# Patient Record
Sex: MALE | Race: WHITE | NOT HISPANIC OR LATINO | Employment: OTHER | URBAN - NONMETROPOLITAN AREA
[De-identification: names, ages, dates, MRNs, and addresses within clinical notes are randomized per-mention and may not be internally consistent; named-entity substitution may affect disease eponyms.]

---

## 2017-06-05 DIAGNOSIS — C18.4 MALIGNANT NEOPLASM OF TRANSVERSE COLON (HCC): Primary | ICD-10-CM

## 2017-06-07 ENCOUNTER — LAB (OUTPATIENT)
Dept: ONCOLOGY | Facility: HOSPITAL | Age: 77
End: 2017-06-07

## 2017-06-07 ENCOUNTER — APPOINTMENT (OUTPATIENT)
Dept: ONCOLOGY | Facility: HOSPITAL | Age: 77
End: 2017-06-07

## 2017-06-07 DIAGNOSIS — C18.4 MALIGNANT NEOPLASM OF TRANSVERSE COLON (HCC): ICD-10-CM

## 2017-06-07 LAB
ALBUMIN SERPL-MCNC: 4.2 G/DL (ref 3.4–4.8)
ALBUMIN/GLOB SERPL: 1.3 G/DL (ref 1.1–1.8)
ALP SERPL-CCNC: 54 U/L (ref 38–126)
ALT SERPL W P-5'-P-CCNC: 16 U/L (ref 21–72)
ANION GAP SERPL CALCULATED.3IONS-SCNC: 11 MMOL/L (ref 5–15)
AST SERPL-CCNC: 23 U/L (ref 17–59)
BASOPHILS # BLD AUTO: 0.01 10*3/MM3 (ref 0–0.2)
BASOPHILS NFR BLD AUTO: 0.2 % (ref 0–2)
BILIRUB SERPL-MCNC: 0.6 MG/DL (ref 0.2–1.3)
BUN BLD-MCNC: 20 MG/DL (ref 7–21)
BUN/CREAT SERPL: 19 (ref 7–25)
CALCIUM SPEC-SCNC: 8.7 MG/DL (ref 8.4–10.2)
CEA SERPL-MCNC: 1 NG/ML (ref 0–5)
CHLORIDE SERPL-SCNC: 104 MMOL/L (ref 95–110)
CO2 SERPL-SCNC: 24 MMOL/L (ref 22–31)
CREAT BLD-MCNC: 1.05 MG/DL (ref 0.7–1.3)
DEPRECATED RDW RBC AUTO: 41.3 FL (ref 35.1–43.9)
EOSINOPHIL # BLD AUTO: 0.15 10*3/MM3 (ref 0–0.7)
EOSINOPHIL NFR BLD AUTO: 3.3 % (ref 0–7)
ERYTHROCYTE [DISTWIDTH] IN BLOOD BY AUTOMATED COUNT: 13.1 % (ref 11.5–14.5)
GFR SERPL CREATININE-BSD FRML MDRD: 68 ML/MIN/1.73
GLOBULIN UR ELPH-MCNC: 3.2 GM/DL (ref 2.3–3.5)
GLUCOSE BLD-MCNC: 98 MG/DL (ref 60–100)
HCT VFR BLD AUTO: 39.3 % (ref 39–49)
HGB BLD-MCNC: 13.4 G/DL (ref 13.7–17.3)
IMM GRANULOCYTES # BLD: 0.01 10*3/MM3 (ref 0–0.02)
IMM GRANULOCYTES NFR BLD: 0.2 % (ref 0–0.5)
LYMPHOCYTES # BLD AUTO: 1.33 10*3/MM3 (ref 0.6–4.2)
LYMPHOCYTES NFR BLD AUTO: 29.4 % (ref 10–50)
MCH RBC QN AUTO: 29.4 PG (ref 26.5–34)
MCHC RBC AUTO-ENTMCNC: 34.1 G/DL (ref 31.5–36.3)
MCV RBC AUTO: 86.2 FL (ref 80–98)
MONOCYTES # BLD AUTO: 0.48 10*3/MM3 (ref 0–0.9)
MONOCYTES NFR BLD AUTO: 10.6 % (ref 0–12)
NEUTROPHILS # BLD AUTO: 2.54 10*3/MM3 (ref 2–8.6)
NEUTROPHILS NFR BLD AUTO: 56.3 % (ref 37–80)
PLATELET # BLD AUTO: 148 10*3/MM3 (ref 150–450)
PMV BLD AUTO: 12.8 FL (ref 8–12)
POTASSIUM BLD-SCNC: 4.5 MMOL/L (ref 3.5–5.1)
PROT SERPL-MCNC: 7.4 G/DL (ref 6.3–8.6)
RBC # BLD AUTO: 4.56 10*6/MM3 (ref 4.37–5.74)
SODIUM BLD-SCNC: 139 MMOL/L (ref 137–145)
WBC NRBC COR # BLD: 4.52 10*3/MM3 (ref 3.2–9.8)

## 2017-06-07 PROCEDURE — 85025 COMPLETE CBC W/AUTO DIFF WBC: CPT

## 2017-06-07 PROCEDURE — 80053 COMPREHEN METABOLIC PANEL: CPT

## 2017-06-07 PROCEDURE — 82378 CARCINOEMBRYONIC ANTIGEN: CPT

## 2017-06-07 PROCEDURE — 36415 COLL VENOUS BLD VENIPUNCTURE: CPT

## 2017-06-16 ENCOUNTER — OFFICE VISIT (OUTPATIENT)
Dept: ONCOLOGY | Facility: CLINIC | Age: 77
End: 2017-06-16

## 2017-06-16 ENCOUNTER — APPOINTMENT (OUTPATIENT)
Dept: ONCOLOGY | Facility: HOSPITAL | Age: 77
End: 2017-06-16

## 2017-06-16 VITALS
WEIGHT: 246.5 LBS | SYSTOLIC BLOOD PRESSURE: 148 MMHG | HEART RATE: 57 BPM | RESPIRATION RATE: 22 BRPM | DIASTOLIC BLOOD PRESSURE: 91 MMHG | TEMPERATURE: 97.6 F

## 2017-06-16 DIAGNOSIS — C18.4 MALIGNANT NEOPLASM OF TRANSVERSE COLON (HCC): Primary | ICD-10-CM

## 2017-06-16 PROCEDURE — 99213 OFFICE O/P EST LOW 20 MIN: CPT | Performed by: INTERNAL MEDICINE

## 2017-06-16 PROCEDURE — G0463 HOSPITAL OUTPT CLINIC VISIT: HCPCS | Performed by: INTERNAL MEDICINE

## 2017-06-16 RX ORDER — LANOLIN ALCOHOL/MO/W.PET/CERES
1000 CREAM (GRAM) TOPICAL DAILY PRN
COMMUNITY
End: 2018-03-28

## 2017-06-16 RX ORDER — ASPIRIN 81 MG/1
81 TABLET ORAL NIGHTLY
COMMUNITY

## 2017-06-16 NOTE — PROGRESS NOTES
Subjective Mr. Hwang is here for follow-up of the colon cancer.    HISTORY OF PRESENT ILLNESS:     History of Present Illness  this is a 77-year-old gentleman with a diagnosis of colon cancer in July 2012.  Patient had left hemicolectomy.  Stage III T3 N1 M0.  Patient received 12 cycles of modified FOLFOX 6 in March 2013.  He has no new complaints.  His CEA is at 1.  He denies any chest pain, shortness of breath abdominal pain nausea vomiting weight loss or melena.  Past Medical History, Past Surgical History, Social History, Family History have been reviewed and are without significant changes except as mentioned.    Review of Systems   Constitutional: Negative.    HENT: Negative.    Eyes: Negative.    Respiratory: Negative.    Cardiovascular: Negative.    Gastrointestinal: Negative.    Endocrine: Negative.    Genitourinary: Negative.    Musculoskeletal: Negative.    Skin: Negative.    Allergic/Immunologic: Negative.    Neurological: Negative.    Hematological: Negative.    Psychiatric/Behavioral: Negative.       A comprehensive 14 point review of systems was performed and was negative except as mentioned.    Medications:  The current medication list was reviewed in the EMR    ALLERGIES:  No Known Allergies    Objective      Vitals:    06/16/17 1036   BP: 148/91   Pulse: 57   Resp: 22   Temp: 97.6 °F (36.4 °C)   Weight: 246 lb 8 oz (112 kg)   PainSc: 0-No pain     Current Status 6/16/2017   ECOG score 1       Physical Exam   Constitutional: He is oriented to person, place, and time. He appears well-developed and well-nourished.   HENT:   Head: Normocephalic and atraumatic.   Nose: Nose normal.   Mouth/Throat: Oropharynx is clear and moist.   Eyes: Conjunctivae and EOM are normal. Pupils are equal, round, and reactive to light.   Neck: Normal range of motion. Neck supple.   Cardiovascular: Normal rate, regular rhythm, normal heart sounds and intact distal pulses.    Pulmonary/Chest: Effort normal and breath  sounds normal.   Abdominal: Soft. Bowel sounds are normal.   Musculoskeletal: Normal range of motion.   Neurological: He is alert and oriented to person, place, and time.   Skin: Skin is warm.   Psychiatric: He has a normal mood and affect. His behavior is normal.   Nursing note and vitals reviewed.        RECENT LABS:  Hematology WBC   Date Value Ref Range Status   06/07/2017 4.52 3.20 - 9.80 10*3/mm3 Final     RBC   Date Value Ref Range Status   06/07/2017 4.56 4.37 - 5.74 10*6/mm3 Final     Hemoglobin   Date Value Ref Range Status   06/07/2017 13.4 (L) 13.7 - 17.3 g/dL Final     Hematocrit   Date Value Ref Range Status   06/07/2017 39.3 39.0 - 49.0 % Final     Platelets   Date Value Ref Range Status   06/07/2017 148 (L) 150 - 450 10*3/mm3 Final          CEA 1.0  Colonoscopy May 2016 WNL.    Assessment/Plan     1.  Stage III colon cancer 2012.  Currently patient has no evidence of disease.  Observation is indicated.  His hemoglobin is slightly low at 13.4.  We will keep close follow up on that.  In case he has any new symptoms patient is asked to come in earlier.  Otherwise plan will be to see him in 6 months with CBC CMP and a CEA.  He will not be due for colonoscopy until 2019.  2.  Thrombocytopenia resolved  3.  Abdominal wall hernia: For now observation.  Patient remains full code.    Sd Robledo M.D.                  6/16/2017      CC:

## 2017-12-08 ENCOUNTER — LAB (OUTPATIENT)
Dept: ONCOLOGY | Facility: HOSPITAL | Age: 77
End: 2017-12-08

## 2017-12-08 DIAGNOSIS — C18.4 MALIGNANT NEOPLASM OF TRANSVERSE COLON (HCC): ICD-10-CM

## 2017-12-08 LAB
ALBUMIN SERPL-MCNC: 4.2 G/DL (ref 3.4–4.8)
ALBUMIN/GLOB SERPL: 1.4 G/DL (ref 1.1–1.8)
ALP SERPL-CCNC: 49 U/L (ref 38–126)
ALT SERPL W P-5'-P-CCNC: 21 U/L (ref 21–72)
ANION GAP SERPL CALCULATED.3IONS-SCNC: 8 MMOL/L (ref 5–15)
AST SERPL-CCNC: 23 U/L (ref 17–59)
BASOPHILS # BLD AUTO: 0.04 10*3/MM3 (ref 0–0.2)
BASOPHILS NFR BLD AUTO: 0.6 % (ref 0–2)
BILIRUB SERPL-MCNC: 0.6 MG/DL (ref 0.2–1.3)
BUN BLD-MCNC: 12 MG/DL (ref 7–21)
BUN/CREAT SERPL: 10.5 (ref 7–25)
CALCIUM SPEC-SCNC: 8.8 MG/DL (ref 8.4–10.2)
CEA SERPL-MCNC: 0.6 NG/ML (ref 0–5)
CHLORIDE SERPL-SCNC: 101 MMOL/L (ref 95–110)
CO2 SERPL-SCNC: 27 MMOL/L (ref 22–31)
CREAT BLD-MCNC: 1.14 MG/DL (ref 0.7–1.3)
DEPRECATED RDW RBC AUTO: 40 FL (ref 35.1–43.9)
EOSINOPHIL # BLD AUTO: 0.32 10*3/MM3 (ref 0–0.7)
EOSINOPHIL NFR BLD AUTO: 5.2 % (ref 0–7)
ERYTHROCYTE [DISTWIDTH] IN BLOOD BY AUTOMATED COUNT: 13.2 % (ref 11.5–14.5)
GFR SERPL CREATININE-BSD FRML MDRD: 62 ML/MIN/1.73 (ref 60–98)
GLOBULIN UR ELPH-MCNC: 3.1 GM/DL (ref 2.3–3.5)
GLUCOSE BLD-MCNC: 92 MG/DL (ref 60–100)
HCT VFR BLD AUTO: 39.5 % (ref 39–49)
HGB BLD-MCNC: 13.3 G/DL (ref 13.7–17.3)
IMM GRANULOCYTES # BLD: 0.02 10*3/MM3 (ref 0–0.02)
IMM GRANULOCYTES NFR BLD: 0.3 % (ref 0–0.5)
LYMPHOCYTES # BLD AUTO: 1.75 10*3/MM3 (ref 0.6–4.2)
LYMPHOCYTES NFR BLD AUTO: 28.3 % (ref 10–50)
MCH RBC QN AUTO: 28.2 PG (ref 26.5–34)
MCHC RBC AUTO-ENTMCNC: 33.7 G/DL (ref 31.5–36.3)
MCV RBC AUTO: 83.9 FL (ref 80–98)
MONOCYTES # BLD AUTO: 0.63 10*3/MM3 (ref 0–0.9)
MONOCYTES NFR BLD AUTO: 10.2 % (ref 0–12)
NEUTROPHILS # BLD AUTO: 3.42 10*3/MM3 (ref 2–8.6)
NEUTROPHILS NFR BLD AUTO: 55.4 % (ref 37–80)
PLATELET # BLD AUTO: 204 10*3/MM3 (ref 150–450)
PMV BLD AUTO: 11.9 FL (ref 8–12)
POTASSIUM BLD-SCNC: 4.3 MMOL/L (ref 3.5–5.1)
PROT SERPL-MCNC: 7.3 G/DL (ref 6.3–8.6)
RBC # BLD AUTO: 4.71 10*6/MM3 (ref 4.37–5.74)
SODIUM BLD-SCNC: 136 MMOL/L (ref 137–145)
WBC NRBC COR # BLD: 6.18 10*3/MM3 (ref 3.2–9.8)

## 2017-12-08 PROCEDURE — 36415 COLL VENOUS BLD VENIPUNCTURE: CPT

## 2017-12-08 PROCEDURE — 85025 COMPLETE CBC W/AUTO DIFF WBC: CPT

## 2017-12-08 PROCEDURE — 80053 COMPREHEN METABOLIC PANEL: CPT

## 2017-12-08 PROCEDURE — 82378 CARCINOEMBRYONIC ANTIGEN: CPT

## 2017-12-15 ENCOUNTER — OFFICE VISIT (OUTPATIENT)
Dept: ONCOLOGY | Facility: CLINIC | Age: 77
End: 2017-12-15

## 2017-12-15 VITALS
DIASTOLIC BLOOD PRESSURE: 83 MMHG | RESPIRATION RATE: 16 BRPM | HEIGHT: 72 IN | TEMPERATURE: 98.2 F | WEIGHT: 248.9 LBS | SYSTOLIC BLOOD PRESSURE: 122 MMHG | HEART RATE: 65 BPM | BODY MASS INDEX: 33.71 KG/M2

## 2017-12-15 DIAGNOSIS — Z85.038 HISTORY OF COLON CANCER: Primary | ICD-10-CM

## 2017-12-15 PROCEDURE — 99214 OFFICE O/P EST MOD 30 MIN: CPT | Performed by: NURSE PRACTITIONER

## 2017-12-15 PROCEDURE — G0463 HOSPITAL OUTPT CLINIC VISIT: HCPCS | Performed by: NURSE PRACTITIONER

## 2017-12-15 NOTE — PROGRESS NOTES
"DATE OF VISIT: 12/15/2017    REASON FOR VISIT:  6 month follow-up for history of colon cancer, T3N1M0        HISTORY OF PRESENT ILLNESS:      77 yr old male with diagnosis of colon cancer, stage III; he was diagnosed in 2012 and completed adjuvant chemotherapy, 12 cycles of FOLFOX in [place] 2013. He has been on clinical surveillance. He was told on his last visit that he is more than 5 yrs out and no additional CT imaging is needed unless he is having any symptoms. He denies any bowel problems, no blood in his stool, appetite is good.          PAST MEDICAL HISTORY:    Past Medical History:   Diagnosis Date   • Colon cancer    • Hernia    • High cholesterol        SOCIAL HISTORY:    Social History   Substance Use Topics   • Smoking status: Former Smoker     Packs/day: 1.00     Years: 30.00     Types: Cigarettes     Quit date: 1997   • Smokeless tobacco: None   • Alcohol use None       Surgical History :  Past Surgical History:   Procedure Laterality Date   • COLON SURGERY     • COLONOSCOPY     • FINGER SURGERY         ALLERGIES:    No Known Allergies    REVIEW OF SYSTEMS:      CONSTITUTIONAL:  No fever, chills, or night sweats.     HEENT:  No epistaxis, mouth sores, or difficulty swallowing.    RESPIRATORY:  No new shortness of breath or cough at present.    CARDIOVASCULAR:  No chest pain or palpitations.    GASTROINTESTINAL:  No abdominal pain, nausea, vomiting, or blood in the stool.    GENITOURINARY:  No dysuria or hematuria.    MUSCULOSKELETAL:  No any new back pain or arthralgias.     NEUROLOGICAL:  No tingling or numbness. No new headache or dizziness.     LYMPHATICS:  Denies any abnormal swollen and anywhere in the body.    SKIN:  Denies any new skin rash.    PHYSICAL EXAMINATION:      VITAL SIGNS:  /83  Pulse 65  Temp 98.2 °F (36.8 °C) (Temporal Artery )   Resp 16  Ht 181.6 cm (71.5\")  Wt 113 kg (248 lb 14.4 oz)  BMI 34.23 kg/m2    GENERAL:  Not in any distress.    HEENT:  Normocephalic, " Atraumatic.Mild Conjunctival pallor. No icterus. No Facial Asymmetry noted.    NECK:  No adenopathy. No JVD.    RESPIRATORY:  Fair air entry bilateral. No rhonchi or wheezing.    CARDIOVASCULAR:  S1, S2. Regular rate and rhythm. No murmur or gallop appreciated.    ABDOMEN:  Soft, obese, nontender. Bowel sounds present in all four quadrants.  No organomegaly appreciated.    EXTREMITIES:  No edema.No Calf Tenderness.    NEUROLOGIC:  Alert, awake and oriented ×3.    SKIN : No new skin lesion identified  DIAGNOSTIC DATA:    Glucose   Date Value Ref Range Status   12/08/2017 92 60 - 100 mg/dL Final     Sodium   Date Value Ref Range Status   12/08/2017 136 (L) 137 - 145 mmol/L Final     Potassium   Date Value Ref Range Status   12/08/2017 4.3 3.5 - 5.1 mmol/L Final     CO2   Date Value Ref Range Status   12/08/2017 27.0 22.0 - 31.0 mmol/L Final     Chloride   Date Value Ref Range Status   12/08/2017 101 95 - 110 mmol/L Final     Anion Gap   Date Value Ref Range Status   12/08/2017 8.0 5.0 - 15.0 mmol/L Final     Creatinine   Date Value Ref Range Status   12/08/2017 1.14 0.70 - 1.30 mg/dL Final     BUN   Date Value Ref Range Status   12/08/2017 12 7 - 21 mg/dL Final     BUN/Creatinine Ratio   Date Value Ref Range Status   12/08/2017 10.5 7.0 - 25.0 Final     Calcium   Date Value Ref Range Status   12/08/2017 8.8 8.4 - 10.2 mg/dL Final     eGFR Non  Amer   Date Value Ref Range Status   12/08/2017 62 >60 mL/min/1.73 Final     Alkaline Phosphatase   Date Value Ref Range Status   12/08/2017 49 38 - 126 U/L Final     Total Protein   Date Value Ref Range Status   12/08/2017 7.3 6.3 - 8.6 g/dL Final     ALT (SGPT)   Date Value Ref Range Status   12/08/2017 21 21 - 72 U/L Final     AST (SGOT)   Date Value Ref Range Status   12/08/2017 23 17 - 59 U/L Final     Total Bilirubin   Date Value Ref Range Status   12/08/2017 0.6 0.2 - 1.3 mg/dL Final     Albumin   Date Value Ref Range Status   12/08/2017 4.20 3.40 - 4.80 g/dL Final      Globulin   Date Value Ref Range Status   12/08/2017 3.1 2.3 - 3.5 gm/dL Final     A/G Ratio   Date Value Ref Range Status   12/08/2017 1.4 1.1 - 1.8 g/dL Final     Lab Results   Component Value Date    WBC 6.18 12/08/2017    HGB 13.3 (L) 12/08/2017    HCT 39.5 12/08/2017    MCV 83.9 12/08/2017     12/08/2017     Lab Results   Component Value Date    NEUTROABS 3.42 12/08/2017    ZDAECJCH73 682 02/11/2014    FOLATE 11.20 02/11/2014     Lab Results   Component Value Date    CEA 0.60 12/08/2017   ]        ASSESSMENT AND PLAN: .      1. Stage III colon cancer 2012.  He completed 12 cycles of adjuvant FOLFOX chemotherapy in March 2013; last CT scan in 2016 showed FADI; he had a colonoscopy in 2016 and will be due again in 2019. He is feeling well, will plan to continue on clinical surveillance. Return to clinic in 6 mos with repeat labs.    2. Health maintenance, he does not smoke.          This document has been signed by MARIA TERESA Nunn on December 15, 2017 1:24 PM

## 2018-04-04 ENCOUNTER — HOSPITAL ENCOUNTER (OUTPATIENT)
Facility: HOSPITAL | Age: 78
Setting detail: HOSPITAL OUTPATIENT SURGERY
Discharge: HOME | End: 2018-04-04
Attending: INTERNAL MEDICINE | Admitting: INTERNAL MEDICINE

## 2018-04-04 ENCOUNTER — ANESTHESIA (OUTPATIENT)
Dept: GASTROENTEROLOGY | Facility: HOSPITAL | Age: 78
End: 2018-04-04

## 2018-04-04 ENCOUNTER — ANESTHESIA EVENT (OUTPATIENT)
Dept: GASTROENTEROLOGY | Facility: HOSPITAL | Age: 78
End: 2018-04-04

## 2018-04-04 VITALS
HEIGHT: 71 IN | SYSTOLIC BLOOD PRESSURE: 116 MMHG | RESPIRATION RATE: 18 BRPM | DIASTOLIC BLOOD PRESSURE: 55 MMHG | BODY MASS INDEX: 35.07 KG/M2 | WEIGHT: 250.5 LBS | OXYGEN SATURATION: 93 % | HEART RATE: 63 BPM | TEMPERATURE: 97.8 F

## 2018-04-04 DIAGNOSIS — K92.2 GASTROINTESTINAL BLEEDING: ICD-10-CM

## 2018-04-04 PROCEDURE — 88305 TISSUE EXAM BY PATHOLOGIST: CPT | Performed by: PATHOLOGY

## 2018-04-04 PROCEDURE — 88305 TISSUE EXAM BY PATHOLOGIST: CPT | Performed by: INTERNAL MEDICINE

## 2018-04-04 PROCEDURE — 25010000002 PROPOFOL 10 MG/ML EMULSION: Performed by: NURSE ANESTHETIST, CERTIFIED REGISTERED

## 2018-04-04 PROCEDURE — 88342 IMHCHEM/IMCYTCHM 1ST ANTB: CPT | Performed by: INTERNAL MEDICINE

## 2018-04-04 PROCEDURE — 88342 IMHCHEM/IMCYTCHM 1ST ANTB: CPT | Performed by: PATHOLOGY

## 2018-04-04 RX ORDER — LIDOCAINE HYDROCHLORIDE 10 MG/ML
INJECTION, SOLUTION INFILTRATION; PERINEURAL AS NEEDED
Status: DISCONTINUED | OUTPATIENT
Start: 2018-04-04 | End: 2018-04-04 | Stop reason: SURG

## 2018-04-04 RX ORDER — PROMETHAZINE HYDROCHLORIDE 25 MG/ML
12.5 INJECTION, SOLUTION INTRAMUSCULAR; INTRAVENOUS ONCE AS NEEDED
Status: DISCONTINUED | OUTPATIENT
Start: 2018-04-04 | End: 2018-04-04 | Stop reason: HOSPADM

## 2018-04-04 RX ORDER — ONDANSETRON 2 MG/ML
4 INJECTION INTRAMUSCULAR; INTRAVENOUS ONCE AS NEEDED
Status: DISCONTINUED | OUTPATIENT
Start: 2018-04-04 | End: 2018-04-04 | Stop reason: HOSPADM

## 2018-04-04 RX ORDER — MEPERIDINE HYDROCHLORIDE 50 MG/ML
12.5 INJECTION INTRAMUSCULAR; INTRAVENOUS; SUBCUTANEOUS
Status: DISCONTINUED | OUTPATIENT
Start: 2018-04-04 | End: 2018-04-04 | Stop reason: HOSPADM

## 2018-04-04 RX ORDER — DEXTROSE AND SODIUM CHLORIDE 5; .45 G/100ML; G/100ML
20 INJECTION, SOLUTION INTRAVENOUS CONTINUOUS
Status: DISCONTINUED | OUTPATIENT
Start: 2018-04-04 | End: 2018-04-04 | Stop reason: HOSPADM

## 2018-04-04 RX ORDER — DEXAMETHASONE SODIUM PHOSPHATE 4 MG/ML
8 INJECTION, SOLUTION INTRA-ARTICULAR; INTRALESIONAL; INTRAMUSCULAR; INTRAVENOUS; SOFT TISSUE ONCE AS NEEDED
Status: DISCONTINUED | OUTPATIENT
Start: 2018-04-04 | End: 2018-04-04 | Stop reason: HOSPADM

## 2018-04-04 RX ORDER — PROPOFOL 10 MG/ML
VIAL (ML) INTRAVENOUS AS NEEDED
Status: DISCONTINUED | OUTPATIENT
Start: 2018-04-04 | End: 2018-04-04 | Stop reason: SURG

## 2018-04-04 RX ORDER — PROMETHAZINE HYDROCHLORIDE 25 MG/1
25 TABLET ORAL ONCE AS NEEDED
Status: DISCONTINUED | OUTPATIENT
Start: 2018-04-04 | End: 2018-04-04 | Stop reason: HOSPADM

## 2018-04-04 RX ORDER — PROMETHAZINE HYDROCHLORIDE 25 MG/1
25 SUPPOSITORY RECTAL ONCE AS NEEDED
Status: DISCONTINUED | OUTPATIENT
Start: 2018-04-04 | End: 2018-04-04 | Stop reason: HOSPADM

## 2018-04-04 RX ADMIN — PROPOFOL 50 MG: 10 INJECTION, EMULSION INTRAVENOUS at 14:23

## 2018-04-04 RX ADMIN — DEXTROSE AND SODIUM CHLORIDE 20 ML/HR: 5; 450 INJECTION, SOLUTION INTRAVENOUS at 13:36

## 2018-04-04 RX ADMIN — PROPOFOL 50 MG: 10 INJECTION, EMULSION INTRAVENOUS at 14:31

## 2018-04-04 RX ADMIN — PROPOFOL 50 MG: 10 INJECTION, EMULSION INTRAVENOUS at 14:19

## 2018-04-04 RX ADMIN — LIDOCAINE HYDROCHLORIDE 50 MG: 10 INJECTION, SOLUTION INFILTRATION; PERINEURAL at 14:19

## 2018-04-04 RX ADMIN — PROPOFOL 50 MG: 10 INJECTION, EMULSION INTRAVENOUS at 14:28

## 2018-04-04 RX ADMIN — PROPOFOL 50 MG: 10 INJECTION, EMULSION INTRAVENOUS at 14:36

## 2018-04-04 NOTE — ANESTHESIA POSTPROCEDURE EVALUATION
Patient: Sunny Ernandez    Procedure Summary     Date:  04/04/18 Room / Location:  [facility] ENDOSCOPY 2 / [facility] ENDOSCOPY    Anesthesia Start:  1419 Anesthesia Stop:  1444    Procedures:       ESOPHAGOGASTRODUODENOSCOPY (N/A Esophagus)      COLONOSCOPY (N/A ) Diagnosis:       Gastrointestinal bleeding      (Gastrointestinal bleeding [K92.2])    Surgeon:  Austin Goins DO Provider:  Luke Hunt CRNA    Anesthesia Type:  MAC ASA Status:  3          Anesthesia Type: MAC  Last vitals  BP   146/85 (04/04/18 1319)   Temp   96.8 °F (36 °C) (04/04/18 1319)   Pulse   77 (04/04/18 1319)   Resp   18 (04/04/18 1319)     SpO2   96 % (04/04/18 1319)     Post Anesthesia Care and Evaluation    Patient location during evaluation: bedside  Patient participation: complete - patient cannot participate  Level of consciousness: awake  Pain score: 0  Pain management: adequate  Airway patency: patent  Anesthetic complications: No anesthetic complications  PONV Status: none  Cardiovascular status: acceptable  Respiratory status: acceptable  Hydration status: acceptable

## 2018-04-04 NOTE — H&P
Agusto Pal DO,[facility]  Gastroenterology  Hepatology  Endoscopy  Board Certified in Internal Medicine and gastroenterology  [address], [address]  [place]. [address]  - [phone]   F - [phone]     GASTROENTEROLOGY HISTORY AND PHYSICAL  NOTE   AGUSTO PAL DO.         SUBJECTIVE:   4/4/2018    Name: Sunny Ernandez  DOD: 1940        Chief Complaint:       Subjective : Gastrointestinal bleeding with occult blood within the stools with anemia with a personal history of colon cancer    Patient is 78 y.o. male presents with desire for elective EGD and colonoscopy.      ROS/HISTORY/ CURRENT MEDICATIONS/OBJECTIVE/VS/PE:   Review of Systems:   Review of Systems    History:     Past Medical History:   Diagnosis Date   • Colon cancer    • Hernia    • High cholesterol      Past Surgical History:   Procedure Laterality Date   • COLON RESECTION  2012   • COLON SURGERY     • COLONOSCOPY     • FINGER SURGERY       Family History   Problem Relation Age of Onset   • Lung cancer Mother      Social History   Substance Use Topics   • Smoking status: Former Smoker     Packs/day: 1.00     Years: 30.00     Types: Cigarettes     Quit date: 1997   • Smokeless tobacco: Current User     Types: Chew   • Alcohol use No     Prescriptions Prior to Admission   Medication Sig Dispense Refill Last Dose   • Cyanocobalamin (B-12 COMPLIANCE INJECTION) 1000 MCG/ML kit Inject 1,000 mcg as directed Every 30 (Thirty) Days.   4/1/2018   • aspirin 81 MG EC tablet Take 81 mg by mouth Daily.   4/1/2018   • Simethicone (GAS-X PO) Take  by mouth As Needed.   3/20/2018   • SIMVASTATIN PO Take 40 mg by mouth Every Night.   Taking     Allergies:  Review of patient's allergies indicates no known allergies.    I have reviewed the patients medical history, surgical history and family history in the available medical record system.     Current Medications:     Current Facility-Administered Medications   Medication Dose  Route Frequency Provider Last Rate Last Dose   • dextrose 5 % and sodium chloride 0.45 % infusion  20 mL/hr Intravenous Continuous Austin BEASLEY Buster, DO 20 mL/hr at 04/04/18 1336 20 mL/hr at 04/04/18 1336       Objective     Physical Exam:   Temp:  [96.8 °F (36 °C)] 96.8 °F (36 °C)  Heart Rate:  [77] 77  Resp:  [18] 18  BP: (146)/(85) 146/85    Physical Exam:  General Appearance:    Alert, cooperative, in no acute distress   Head:    Normocephalic, without obvious abnormality, atraumatic   Eyes:            Lids and lashes normal, conjunctivae and sclerae normal, no   icterus, no pallor, corneas clear, PERRLA   Ears:    Ears appear intact with no abnormalities noted   Throat:   No oral lesions, no thrush, oral mucosa moist   Neck:   No adenopathy, supple, trachea midline, no thyromegaly, no     carotid bruit, no JVD   Back:     No kyphosis present, no scoliosis present, no skin lesions,       erythema or scars, no tenderness to percussion or                   palpation,   range of motion normal   Lungs:     Clear to auscultation,respirations regular, even and                   unlabored    Heart:    Regular rhythm and normal rate, normal S1 and S2, no            murmur, no gallop, no rub, no click   Breast Exam:    Deferred   Abdomen:     Normal bowel sounds, no masses, no organomegaly, soft        non-tender, non-distended, no guarding, no rebound                 tenderness   Genitalia:    Deferred   Extremities:   Moves all extremities well, no edema, no cyanosis, no              redness   Pulses:   Pulses palpable and equal bilaterally   Skin:   No bleeding, bruising or rash   Lymph nodes:   No palpable adenopathy   Neurologic:   Cranial nerves 2 - 12 grossly intact, sensation intact, DTR        present and equal bilaterally      Results Review:     Lab Results   Component Value Date    WBC 6.18 12/08/2017    WBC 4.52 06/07/2017    WBC 5.4 12/02/2016    HGB 13.3 (L) 12/08/2017    HGB 13.4 (L) 06/07/2017    HGB 14.9  12/02/2016    HCT 39.5 12/08/2017    HCT 39.3 06/07/2017    HCT 41.7 12/02/2016     12/08/2017     (L) 06/07/2017     12/02/2016             No results found for: LIPASE  No results found for: INR       Radiology Review:  Imaging Results (last 72 hours)     ** No results found for the last 72 hours. **           I reviewed the patient's new clinical results.  I reviewed the patient's new imaging results and agree with the interpretation.     ASSESSMENT/PLAN:   ASSESSMENT:   1.  Gastrointestinal bleeding  2.  Occult blood within the stools  3.  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  Personal history of colon cancer  5.  Personal history of colon polyps    PLAN:   1.  Esophagogastroduodenoscopy with biopsy  2.  Colonoscopy    Risk and benefits associated with procedure are reviewed with the patient.  The patient wishes to proceed      Austin Goins DO  04/04/18  2:13 PM

## 2018-04-05 LAB
LAB AP CASE REPORT: NORMAL
LAB AP DIAGNOSIS COMMENT: NORMAL
Lab: NORMAL
PATH REPORT.FINAL DX SPEC: NORMAL
PATH REPORT.GROSS SPEC: NORMAL

## 2018-04-10 ENCOUNTER — OFFICE VISIT (OUTPATIENT)
Dept: SURGERY | Facility: CLINIC | Age: 78
End: 2018-04-10

## 2018-04-10 VITALS
WEIGHT: 252 LBS | SYSTOLIC BLOOD PRESSURE: 130 MMHG | DIASTOLIC BLOOD PRESSURE: 80 MMHG | BODY MASS INDEX: 34.13 KG/M2 | HEIGHT: 72 IN

## 2018-04-10 DIAGNOSIS — C18.2 MALIGNANT NEOPLASM OF ASCENDING COLON (HCC): Primary | ICD-10-CM

## 2018-04-10 DIAGNOSIS — K43.2 INCISIONAL HERNIA, WITHOUT OBSTRUCTION OR GANGRENE: ICD-10-CM

## 2018-04-10 PROCEDURE — 99214 OFFICE O/P EST MOD 30 MIN: CPT | Performed by: SURGERY

## 2018-04-10 RX ORDER — SODIUM CHLORIDE 9 MG/ML
100 INJECTION, SOLUTION INTRAVENOUS CONTINUOUS
Status: CANCELLED | OUTPATIENT
Start: 2018-04-18

## 2018-04-13 ENCOUNTER — APPOINTMENT (OUTPATIENT)
Dept: PREADMISSION TESTING | Facility: HOSPITAL | Age: 78
End: 2018-04-13

## 2018-04-13 VITALS
HEIGHT: 71 IN | SYSTOLIC BLOOD PRESSURE: 120 MMHG | HEART RATE: 78 BPM | WEIGHT: 251 LBS | RESPIRATION RATE: 18 BRPM | BODY MASS INDEX: 35.14 KG/M2 | DIASTOLIC BLOOD PRESSURE: 80 MMHG | OXYGEN SATURATION: 98 %

## 2018-04-13 DIAGNOSIS — C18.2 MALIGNANT NEOPLASM OF ASCENDING COLON (HCC): ICD-10-CM

## 2018-04-13 LAB
ABO GROUP BLD: NORMAL
ANION GAP SERPL CALCULATED.3IONS-SCNC: 10 MMOL/L (ref 5–15)
BLD GP AB SCN SERPL QL: NEGATIVE
BUN BLD-MCNC: 15 MG/DL (ref 7–21)
BUN/CREAT SERPL: 13.2 (ref 7–25)
CALCIUM SPEC-SCNC: 9 MG/DL (ref 8.4–10.2)
CHLORIDE SERPL-SCNC: 104 MMOL/L (ref 95–110)
CO2 SERPL-SCNC: 25 MMOL/L (ref 22–31)
CREAT BLD-MCNC: 1.14 MG/DL (ref 0.7–1.3)
DEPRECATED RDW RBC AUTO: 37.7 FL (ref 35.1–43.9)
ERYTHROCYTE [DISTWIDTH] IN BLOOD BY AUTOMATED COUNT: 13.1 % (ref 11.5–14.5)
GFR SERPL CREATININE-BSD FRML MDRD: 62 ML/MIN/1.73 (ref 60–98)
GLUCOSE BLD-MCNC: 113 MG/DL (ref 60–100)
HCT VFR BLD AUTO: 32.6 % (ref 39–49)
HGB BLD-MCNC: 11 G/DL (ref 13.7–17.3)
Lab: NORMAL
MCH RBC QN AUTO: 26.6 PG (ref 26.5–34)
MCHC RBC AUTO-ENTMCNC: 33.7 G/DL (ref 31.5–36.3)
MCV RBC AUTO: 78.7 FL (ref 80–98)
MRSA DNA SPEC QL NAA+PROBE: NEGATIVE
PLATELET # BLD AUTO: 223 10*3/MM3 (ref 150–450)
PMV BLD AUTO: 11.7 FL (ref 8–12)
POTASSIUM BLD-SCNC: 4.3 MMOL/L (ref 3.5–5.1)
RBC # BLD AUTO: 4.14 10*6/MM3 (ref 4.37–5.74)
RH BLD: POSITIVE
SODIUM BLD-SCNC: 139 MMOL/L (ref 137–145)
T&S EXPIRATION DATE: NORMAL
WBC NRBC COR # BLD: 5 10*3/MM3 (ref 3.2–9.8)

## 2018-04-13 PROCEDURE — 86901 BLOOD TYPING SEROLOGIC RH(D): CPT | Performed by: ANESTHESIOLOGY

## 2018-04-13 PROCEDURE — 86900 BLOOD TYPING SEROLOGIC ABO: CPT | Performed by: ANESTHESIOLOGY

## 2018-04-13 PROCEDURE — 86850 RBC ANTIBODY SCREEN: CPT | Performed by: ANESTHESIOLOGY

## 2018-04-13 PROCEDURE — 85027 COMPLETE CBC AUTOMATED: CPT | Performed by: SURGERY

## 2018-04-13 PROCEDURE — 80048 BASIC METABOLIC PNL TOTAL CA: CPT | Performed by: SURGERY

## 2018-04-13 PROCEDURE — 36415 COLL VENOUS BLD VENIPUNCTURE: CPT

## 2018-04-13 PROCEDURE — 87641 MR-STAPH DNA AMP PROBE: CPT | Performed by: ANESTHESIOLOGY

## 2018-04-13 NOTE — DISCHARGE INSTRUCTIONS
[facility]  Pre-op Information and Guidelines    You will be called after 2 p.m. the day before your surgery (Friday for Monday surgery) and notified of your time for arrival and approximate surgery time.  If you have not received a call by 4P.M., please contact Same Day Surgery at [phone] of if outside [facility] call [phone].    Please Follow these Important Safety Guidelines:    • The morning of your procedure, take only the medications listed below with   A sip of water:_____________________________________________       ______________________________________________    • DO NOT eat or drink anything after 12:00 midnight the night before surgery  Specific instructions concerning drinking clear liquids will be discussed during  the pre-surgery instruction call the day before your surgery.    • If you take a blood thinner (ex. Plavix, Coumadin, aspirin), ask your doctor when to stop it before surgery  STOP DATE: _________________    • Only 2 visitors are allowed in patient rooms at a time  Your visitors will be asked to wait in the lobby until the admission process is complete with the exception of a parent with a child and patients in need of special assistance.    • YOU CANNOT DRIVE YOURSELF HOME  You must be accompanied by someone who will be responsible for driving you home after surgery and for your care at home.    • DO NOT chew gum, use breath mints, hard candy, or smoke the day of surgery  • DO NOT drink alcohol for at least 24 hours before your surgery  • DO NOT wear any jewelry and remove all body piercing before coming to the hospital  • DO NOT wear make-up to the hospital  • If you are having surgery on an extremity (arm/leg/foot) remove nail polish/artificial nails on the surgical side  • Clothing, glasses, contacts, dentures, and hairpieces must be removed before surgery  • Bathe the night before or the morning of your surgery and do not use powders/lotions on  skin.

## 2018-04-18 ENCOUNTER — ANESTHESIA EVENT (OUTPATIENT)
Dept: PERIOP | Facility: HOSPITAL | Age: 78
End: 2018-04-18

## 2018-04-18 ENCOUNTER — HOSPITAL ENCOUNTER (INPATIENT)
Facility: HOSPITAL | Age: 78
LOS: 5 days | Discharge: HOME | End: 2018-04-23
Attending: SURGERY | Admitting: SURGERY

## 2018-04-18 ENCOUNTER — ANESTHESIA (OUTPATIENT)
Dept: PERIOP | Facility: HOSPITAL | Age: 78
End: 2018-04-18

## 2018-04-18 DIAGNOSIS — C18.2 MALIGNANT NEOPLASM OF ASCENDING COLON (HCC): ICD-10-CM

## 2018-04-18 DIAGNOSIS — Z74.09 IMPAIRED FUNCTIONAL MOBILITY, BALANCE, GAIT, AND ENDURANCE: ICD-10-CM

## 2018-04-18 LAB
ABO GROUP BLD: NORMAL
BLD GP AB SCN SERPL QL: NEGATIVE
Lab: NORMAL
RH BLD: POSITIVE
T&S EXPIRATION DATE: NORMAL

## 2018-04-18 PROCEDURE — 25010000002 DEXAMETHASONE SODIUM PHOSPHATE 10 MG/ML SOLUTION: Performed by: NURSE ANESTHETIST, CERTIFIED REGISTERED

## 2018-04-18 PROCEDURE — 25010000002 MIDAZOLAM PER 1 MG: Performed by: NURSE ANESTHETIST, CERTIFIED REGISTERED

## 2018-04-18 PROCEDURE — 0WQF0ZZ REPAIR ABDOMINAL WALL, OPEN APPROACH: ICD-10-PCS | Performed by: SURGERY

## 2018-04-18 PROCEDURE — 25010000002 NEOSTIGMINE PER 0.5 MG: Performed by: NURSE ANESTHETIST, CERTIFIED REGISTERED

## 2018-04-18 PROCEDURE — 88307 TISSUE EXAM BY PATHOLOGIST: CPT | Performed by: SURGERY

## 2018-04-18 PROCEDURE — 25010000002 CEFOXITIN PER 1 G: Performed by: SURGERY

## 2018-04-18 PROCEDURE — 25010000002 CEFOXITIN: Performed by: SURGERY

## 2018-04-18 PROCEDURE — 25010000003 MORPHINE PER 10 MG: Performed by: SURGERY

## 2018-04-18 PROCEDURE — 49560 PR REPAIR INCISIONAL HERNIA,REDUCIBLE: CPT | Performed by: SURGERY

## 2018-04-18 PROCEDURE — 44140 PARTIAL REMOVAL OF COLON: CPT | Performed by: SURGERY

## 2018-04-18 PROCEDURE — 88309 TISSUE EXAM BY PATHOLOGIST: CPT | Performed by: PATHOLOGY

## 2018-04-18 PROCEDURE — 86901 BLOOD TYPING SEROLOGIC RH(D): CPT | Performed by: ANESTHESIOLOGY

## 2018-04-18 PROCEDURE — 25010000002 DEXAMETHASONE PER 1 MG: Performed by: NURSE ANESTHETIST, CERTIFIED REGISTERED

## 2018-04-18 PROCEDURE — 25010000002 ONDANSETRON PER 1 MG: Performed by: NURSE ANESTHETIST, CERTIFIED REGISTERED

## 2018-04-18 PROCEDURE — 25010000002 PROPOFOL 10 MG/ML EMULSION: Performed by: NURSE ANESTHETIST, CERTIFIED REGISTERED

## 2018-04-18 PROCEDURE — 0DTF0ZZ RESECTION OF RIGHT LARGE INTESTINE, OPEN APPROACH: ICD-10-PCS | Performed by: SURGERY

## 2018-04-18 PROCEDURE — 86850 RBC ANTIBODY SCREEN: CPT | Performed by: ANESTHESIOLOGY

## 2018-04-18 PROCEDURE — 86900 BLOOD TYPING SEROLOGIC ABO: CPT | Performed by: ANESTHESIOLOGY

## 2018-04-18 PROCEDURE — 25010000002 FENTANYL CITRATE (PF) 100 MCG/2ML SOLUTION: Performed by: NURSE ANESTHETIST, CERTIFIED REGISTERED

## 2018-04-18 PROCEDURE — 25010000002 HYDROMORPHONE PER 4 MG: Performed by: NURSE ANESTHETIST, CERTIFIED REGISTERED

## 2018-04-18 PROCEDURE — 86923 COMPATIBILITY TEST ELECTRIC: CPT

## 2018-04-18 PROCEDURE — 25010000002 SUCCINYLCHOLINE PER 20 MG: Performed by: NURSE ANESTHETIST, CERTIFIED REGISTERED

## 2018-04-18 PROCEDURE — 94799 UNLISTED PULMONARY SVC/PX: CPT

## 2018-04-18 RX ORDER — SUCCINYLCHOLINE CHLORIDE 20 MG/ML
INJECTION INTRAMUSCULAR; INTRAVENOUS AS NEEDED
Status: DISCONTINUED | OUTPATIENT
Start: 2018-04-18 | End: 2018-04-18 | Stop reason: SURG

## 2018-04-18 RX ORDER — LIDOCAINE HYDROCHLORIDE 20 MG/ML
INJECTION, SOLUTION INFILTRATION; PERINEURAL AS NEEDED
Status: DISCONTINUED | OUTPATIENT
Start: 2018-04-18 | End: 2018-04-18 | Stop reason: SURG

## 2018-04-18 RX ORDER — MEPERIDINE HYDROCHLORIDE 50 MG/ML
12.5 INJECTION INTRAMUSCULAR; INTRAVENOUS; SUBCUTANEOUS
Status: DISCONTINUED | OUTPATIENT
Start: 2018-04-18 | End: 2018-04-18 | Stop reason: HOSPADM

## 2018-04-18 RX ORDER — MORPHINE SULFATE 1 MG/ML
INJECTION INTRAVENOUS CONTINUOUS
Status: DISCONTINUED | OUTPATIENT
Start: 2018-04-18 | End: 2018-04-21

## 2018-04-18 RX ORDER — SODIUM CHLORIDE 9 MG/ML
100 INJECTION, SOLUTION INTRAVENOUS CONTINUOUS
Status: DISCONTINUED | OUTPATIENT
Start: 2018-04-18 | End: 2018-04-20

## 2018-04-18 RX ORDER — DEXAMETHASONE SODIUM PHOSPHATE 4 MG/ML
INJECTION, SOLUTION INTRA-ARTICULAR; INTRALESIONAL; INTRAMUSCULAR; INTRAVENOUS; SOFT TISSUE AS NEEDED
Status: DISCONTINUED | OUTPATIENT
Start: 2018-04-18 | End: 2018-04-18 | Stop reason: SURG

## 2018-04-18 RX ORDER — DEXAMETHASONE SODIUM PHOSPHATE 10 MG/ML
8 INJECTION, SOLUTION INTRAMUSCULAR; INTRAVENOUS ONCE
Status: COMPLETED | OUTPATIENT
Start: 2018-04-18 | End: 2018-04-18

## 2018-04-18 RX ORDER — ONDANSETRON 4 MG/1
4 TABLET, ORALLY DISINTEGRATING ORAL EVERY 6 HOURS PRN
Status: DISCONTINUED | OUTPATIENT
Start: 2018-04-18 | End: 2018-04-23 | Stop reason: HOSPADM

## 2018-04-18 RX ORDER — VECURONIUM BROMIDE 20 MG/20ML
INJECTION, POWDER, LYOPHILIZED, FOR SOLUTION INTRAVENOUS AS NEEDED
Status: DISCONTINUED | OUTPATIENT
Start: 2018-04-18 | End: 2018-04-18 | Stop reason: SURG

## 2018-04-18 RX ORDER — ALBUTEROL SULFATE 2.5 MG/3ML
2.5 SOLUTION RESPIRATORY (INHALATION) ONCE AS NEEDED
Status: DISCONTINUED | OUTPATIENT
Start: 2018-04-18 | End: 2018-04-18 | Stop reason: HOSPADM

## 2018-04-18 RX ORDER — SODIUM CHLORIDE, SODIUM GLUCONATE, SODIUM ACETATE, POTASSIUM CHLORIDE, AND MAGNESIUM CHLORIDE 526; 502; 368; 37; 30 MG/100ML; MG/100ML; MG/100ML; MG/100ML; MG/100ML
INJECTION, SOLUTION INTRAVENOUS CONTINUOUS PRN
Status: DISCONTINUED | OUTPATIENT
Start: 2018-04-18 | End: 2018-04-18 | Stop reason: SURG

## 2018-04-18 RX ORDER — ONDANSETRON 2 MG/ML
INJECTION INTRAMUSCULAR; INTRAVENOUS AS NEEDED
Status: DISCONTINUED | OUTPATIENT
Start: 2018-04-18 | End: 2018-04-18 | Stop reason: SURG

## 2018-04-18 RX ORDER — ONDANSETRON 4 MG/1
4 TABLET, FILM COATED ORAL EVERY 6 HOURS PRN
Status: DISCONTINUED | OUTPATIENT
Start: 2018-04-18 | End: 2018-04-23 | Stop reason: HOSPADM

## 2018-04-18 RX ORDER — MIDAZOLAM HYDROCHLORIDE 1 MG/ML
INJECTION INTRAMUSCULAR; INTRAVENOUS AS NEEDED
Status: DISCONTINUED | OUTPATIENT
Start: 2018-04-18 | End: 2018-04-18 | Stop reason: SURG

## 2018-04-18 RX ORDER — ONDANSETRON 2 MG/ML
4 INJECTION INTRAMUSCULAR; INTRAVENOUS EVERY 6 HOURS PRN
Status: DISCONTINUED | OUTPATIENT
Start: 2018-04-18 | End: 2018-04-23 | Stop reason: HOSPADM

## 2018-04-18 RX ORDER — GLYCOPYRROLATE 0.2 MG/ML
INJECTION INTRAMUSCULAR; INTRAVENOUS AS NEEDED
Status: DISCONTINUED | OUTPATIENT
Start: 2018-04-18 | End: 2018-04-18 | Stop reason: SURG

## 2018-04-18 RX ORDER — PROPOFOL 10 MG/ML
VIAL (ML) INTRAVENOUS AS NEEDED
Status: DISCONTINUED | OUTPATIENT
Start: 2018-04-18 | End: 2018-04-18 | Stop reason: SURG

## 2018-04-18 RX ORDER — DEXAMETHASONE SODIUM PHOSPHATE 4 MG/ML
8 INJECTION, SOLUTION INTRA-ARTICULAR; INTRALESIONAL; INTRAMUSCULAR; INTRAVENOUS; SOFT TISSUE ONCE
Status: DISCONTINUED | OUTPATIENT
Start: 2018-04-18 | End: 2018-04-18

## 2018-04-18 RX ORDER — SODIUM CHLORIDE 9 MG/ML
75 INJECTION, SOLUTION INTRAVENOUS CONTINUOUS
Status: DISCONTINUED | OUTPATIENT
Start: 2018-04-18 | End: 2018-04-21

## 2018-04-18 RX ORDER — ONDANSETRON 2 MG/ML
4 INJECTION INTRAMUSCULAR; INTRAVENOUS ONCE AS NEEDED
Status: DISCONTINUED | OUTPATIENT
Start: 2018-04-18 | End: 2018-04-18 | Stop reason: HOSPADM

## 2018-04-18 RX ORDER — NALOXONE HCL 0.4 MG/ML
0.4 VIAL (ML) INJECTION
Status: DISCONTINUED | OUTPATIENT
Start: 2018-04-18 | End: 2018-04-23 | Stop reason: HOSPADM

## 2018-04-18 RX ORDER — FENTANYL CITRATE 50 UG/ML
INJECTION, SOLUTION INTRAMUSCULAR; INTRAVENOUS AS NEEDED
Status: DISCONTINUED | OUTPATIENT
Start: 2018-04-18 | End: 2018-04-18 | Stop reason: SURG

## 2018-04-18 RX ORDER — EPHEDRINE SULFATE 50 MG/ML
INJECTION, SOLUTION INTRAVENOUS AS NEEDED
Status: DISCONTINUED | OUTPATIENT
Start: 2018-04-18 | End: 2018-04-18 | Stop reason: SURG

## 2018-04-18 RX ORDER — NALOXONE HCL 0.4 MG/ML
0.1 VIAL (ML) INJECTION
Status: DISCONTINUED | OUTPATIENT
Start: 2018-04-18 | End: 2018-04-23 | Stop reason: HOSPADM

## 2018-04-18 RX ADMIN — DEXAMETHASONE SODIUM PHOSPHATE 8 MG: 10 INJECTION, SOLUTION INTRAMUSCULAR; INTRAVENOUS at 13:13

## 2018-04-18 RX ADMIN — CEFOXITIN 2 G: 2 INJECTION, POWDER, FOR SOLUTION INTRAVENOUS at 09:46

## 2018-04-18 RX ADMIN — EPHEDRINE SULFATE 10 MG: 50 INJECTION INTRAVENOUS at 09:41

## 2018-04-18 RX ADMIN — MORPHINE SULFATE: 1 INJECTION, SOLUTION INTRAVENOUS at 14:00

## 2018-04-18 RX ADMIN — SODIUM CHLORIDE, SODIUM GLUCONATE, SODIUM ACETATE, POTASSIUM CHLORIDE, AND MAGNESIUM CHLORIDE: 526; 502; 368; 37; 30 INJECTION, SOLUTION INTRAVENOUS at 09:30

## 2018-04-18 RX ADMIN — FENTANYL CITRATE 50 MCG: 50 INJECTION, SOLUTION INTRAMUSCULAR; INTRAVENOUS at 09:50

## 2018-04-18 RX ADMIN — HYDROMORPHONE HYDROCHLORIDE 0.25 MG: 1 INJECTION, SOLUTION INTRAMUSCULAR; INTRAVENOUS; SUBCUTANEOUS at 14:27

## 2018-04-18 RX ADMIN — MIDAZOLAM 1 MG: 1 INJECTION INTRAMUSCULAR; INTRAVENOUS at 09:10

## 2018-04-18 RX ADMIN — PROPOFOL 40 MG: 10 INJECTION, EMULSION INTRAVENOUS at 09:21

## 2018-04-18 RX ADMIN — VECURONIUM BROMIDE 2 MG: 1 INJECTION, POWDER, LYOPHILIZED, FOR SOLUTION INTRAVENOUS at 10:40

## 2018-04-18 RX ADMIN — Medication 4 MG: at 12:10

## 2018-04-18 RX ADMIN — LIDOCAINE HYDROCHLORIDE 80 MG: 20 INJECTION, SOLUTION INFILTRATION; PERINEURAL at 09:20

## 2018-04-18 RX ADMIN — VECURONIUM BROMIDE 1 MG: 1 INJECTION, POWDER, LYOPHILIZED, FOR SOLUTION INTRAVENOUS at 11:47

## 2018-04-18 RX ADMIN — VECURONIUM BROMIDE 7 MG: 1 INJECTION, POWDER, LYOPHILIZED, FOR SOLUTION INTRAVENOUS at 09:32

## 2018-04-18 RX ADMIN — SODIUM CHLORIDE 100 ML/HR: 9 INJECTION, SOLUTION INTRAVENOUS at 07:50

## 2018-04-18 RX ADMIN — CEFOXITIN 2 G: 2 INJECTION, POWDER, FOR SOLUTION INTRAVENOUS at 22:53

## 2018-04-18 RX ADMIN — VECURONIUM BROMIDE 1 MG: 1 INJECTION, POWDER, LYOPHILIZED, FOR SOLUTION INTRAVENOUS at 09:22

## 2018-04-18 RX ADMIN — FENTANYL CITRATE 50 MCG: 50 INJECTION, SOLUTION INTRAMUSCULAR; INTRAVENOUS at 10:08

## 2018-04-18 RX ADMIN — CEFOXITIN 2 G: 2 INJECTION, POWDER, FOR SOLUTION INTRAVENOUS at 18:29

## 2018-04-18 RX ADMIN — SODIUM CHLORIDE 125 ML/HR: 9 INJECTION, SOLUTION INTRAVENOUS at 17:09

## 2018-04-18 RX ADMIN — MIDAZOLAM 1 MG: 1 INJECTION INTRAMUSCULAR; INTRAVENOUS at 09:17

## 2018-04-18 RX ADMIN — EPHEDRINE SULFATE 10 MG: 50 INJECTION INTRAVENOUS at 09:35

## 2018-04-18 RX ADMIN — DEXAMETHASONE SODIUM PHOSPHATE 4 MG: 4 INJECTION, SOLUTION INTRAMUSCULAR; INTRAVENOUS at 10:04

## 2018-04-18 RX ADMIN — PROPOFOL 80 MG: 10 INJECTION, EMULSION INTRAVENOUS at 09:20

## 2018-04-18 RX ADMIN — ONDANSETRON 4 MG: 2 INJECTION INTRAMUSCULAR; INTRAVENOUS at 10:04

## 2018-04-18 RX ADMIN — FENTANYL CITRATE 100 MCG: 50 INJECTION, SOLUTION INTRAMUSCULAR; INTRAVENOUS at 09:20

## 2018-04-18 RX ADMIN — SODIUM CHLORIDE, SODIUM GLUCONATE, SODIUM ACETATE, POTASSIUM CHLORIDE, AND MAGNESIUM CHLORIDE: 526; 502; 368; 37; 30 INJECTION, SOLUTION INTRAVENOUS at 10:47

## 2018-04-18 RX ADMIN — SODIUM CHLORIDE, SODIUM GLUCONATE, SODIUM ACETATE, POTASSIUM CHLORIDE, AND MAGNESIUM CHLORIDE: 526; 502; 368; 37; 30 INJECTION, SOLUTION INTRAVENOUS at 10:05

## 2018-04-18 RX ADMIN — ONDANSETRON 4 MG: 2 INJECTION INTRAMUSCULAR; INTRAVENOUS at 11:30

## 2018-04-18 RX ADMIN — GLYCOPYRROLATE 0.6 MG: 0.2 INJECTION, SOLUTION INTRAMUSCULAR; INTRAVENOUS at 12:10

## 2018-04-18 RX ADMIN — MEPERIDINE HYDROCHLORIDE 12.5 MG: 25 INJECTION, SOLUTION INTRAMUSCULAR; INTRAVENOUS; SUBCUTANEOUS at 11:48

## 2018-04-18 RX ADMIN — FENTANYL CITRATE 50 MCG: 50 INJECTION, SOLUTION INTRAMUSCULAR; INTRAVENOUS at 10:19

## 2018-04-18 RX ADMIN — SUCCINYLCHOLINE CHLORIDE 100 MG: 20 INJECTION, SOLUTION INTRAMUSCULAR; INTRAVENOUS at 09:22

## 2018-04-18 RX ADMIN — EPHEDRINE SULFATE 5 MG: 50 INJECTION INTRAVENOUS at 10:46

## 2018-04-18 RX ADMIN — MEPERIDINE HYDROCHLORIDE 12.5 MG: 25 INJECTION, SOLUTION INTRAMUSCULAR; INTRAVENOUS; SUBCUTANEOUS at 12:02

## 2018-04-18 RX ADMIN — HYDROMORPHONE HYDROCHLORIDE 0.25 MG: 1 INJECTION, SOLUTION INTRAMUSCULAR; INTRAVENOUS; SUBCUTANEOUS at 15:23

## 2018-04-18 NOTE — OP NOTE
Operative Note    Sunny Ernandez  4/18/2018    Pre-op Diagnosis:   Malignant neoplasm of ascending colon [C18.2]  Incisional hernias    Post-op Diagnosis:     Post-Op Diagnosis Codes:     * Malignant neoplasm of ascending colon [C18.2]  Incisional hernias    Procedure/CPT® Codes:      Procedure(s):  Open Right Colon Resection with primary repair of incisional hernias    Surgeon(s):  Frantz Shelley MD    Anesthesia: General    Staff:   Circulator: Miriam Bernabe RN; Brittany Jean-Baptiste RN  Scrub Person: Aria Colorado; Frankie Baron  Assistant: Ankita Morgan CSA    Estimated Blood Loss: 150 mL    Specimens:                ID Type Source Tests Collected by Time   A : right colon Tissue Large Intestine, Right / Ascending Colon TISSUE PATHOLOGY EXAM Frantz Shelley MD 4/18/2018 1005         Drains:      Findings: cecal mass consistent with endoscopically identified carcinoma. Multiple incisional hernias containing small bowel and omentum    Complications: none    Indication: Adenocarcinoma of Right Colon    Operative Note:    Patient was Seen and consented in the preoperative area.  Following this he is brought to the operating room and placed in supine position on the OR table.  Gen. anesthetic was administered and the patient was orotracheally intubated.  Partida catheter was placed sterilely by the nursing staff.  Additional IV access and an arterial line were placed by anesthesia.  A nasogastric tube was also inserted by anesthesia.  A preoperative briefing was performed.  The abdomen was prepped and draped in normal sterile fashion.  A timeout was then performed.    The upper portion of the patient's abdomen at the site of his prior incision was identified.  The prior scar was excised using a knife and Bovie left cautery.  Dissection then proceeded through the subcutaneous tissues until the fascia as well as the upper portion of an incisional hernia sac were identified.  The hernia sac was  then opened sharply.  Small bowel within this initial hernia was reduced into the abdomen.  The hernia sac was then opened further.  We proceeded down the incision reducing hernia contents that included omental fat as well as small intestine until the entirety of the incision work was opened.  The patient appeared to have multiple incisional hernias.  All the contents of the incisional hernias were reduced back into the abdomen.  The omentum was then dissected away from the posterior abdominal wall.  Adhesions of the small intestine were also dissected away into the abdominal wall was free and clear.    The self-retaining retractor was then placed to aid in exposure.  The right colon was identified.  It appeared to be fairly mobile.  The remaining attachments of the right colon to the abdominal wall were then sharply divided using Bovie electrocautery.  Adhesions of the terminal ileum to the pelvic sidewall were also divided.  At this point the right colon all the way up to and including the hepatic flexure appeared to be well mobilized.  I confirmed the presence of a firm palpable mass within the cecum consistent with the patient's known colon cancer.    Adhesions of the omentum to adjacent structures as well as the abdominal wall were then taken down so that the midportion of the area of the transverse colon could be identified.  The patient had a prior colon resection in this area however what appeared to be his middle colic artery appeared to be intact.  Directly proximal to this vessel which was palpated and pulsatile a mesenteric window was created.  A EMIL stapler was then used to divide the colon at this point.  A site was then selected on the terminal ileum for division and this process was repeated.  The Enseal device was then used to divide the mesentery all the way down to its base.  This division was done from the terminal ileum down to the base of the mesentery and from our area of colon vision all the  way down to the base of the mesentery to the only thing keeping the specimen and the abdomen was the right colic vascular pedicle.  This was divided using the Enseal and then oversewn using a 2-0 Vicryl stitch.  It appeared to be hemostatic.  During the course of our mesenteric division the duodenum was identified and protected.    The 2 ends of bowel were then brought together so that a [id number] orientation was achieved.  Stay sutures were placed in the bowel wall to maintain its orientation.  The mesenteric defect was then closed.  A portion of each staple line was then removed to allow for the EMIL stapler to be placed in both bowel lumens.  The stapler was then fired to create a side to side anastomosis.  The open ends of the bowel were then closed using a TA stapler.  The staple line at the end of the bowel was then oversewn using interrupted Vicryl sutures.  Reinforcing sutures were placed near the crotch of the anastomosis.  The anastomosis was palpated and found to be widely patent.    Attention was then turned to closure of the abdomen.  Multiple hernia sacs were removed using Bovie electrocautery and were not sent as specimen.  The edges of the fascia were then identified.  Portions of Prolene suture from his prior operation were then removed.  The fascia was then closed primarily using running #1 PDS suture.  There appeared to be a large amount of dead space between the skin and abdominal wall and therefore a 19 Indonesian drain was placed through a separate stab incision and placed in the subcutaneous space.  The wound was then copiously irrigated.  Staples were used to close the skin.  Sterile dressings were then applied.  The patient was then awakened and returned to the recovery room in good condition.          This document has been electronically signed by Frantz Shelley MD on April 18, 2018 12:50 PM        Frantz Shelley MD     Date: 4/18/2018  Time: 12:41 PM

## 2018-04-18 NOTE — ANESTHESIA POSTPROCEDURE EVALUATION
Patient: Sunny Ernandez    Procedure Summary     Date:  04/18/18 Room / Location:  [facility] OR 03 / [facility] OR    Anesthesia Start:  0915 Anesthesia Stop:  1225    Procedure:  Open Right Colon Resection with repair of incisional hernias (N/A Abdomen) Diagnosis:       Malignant neoplasm of ascending colon      (Malignant neoplasm of ascending colon [C18.2])    Surgeon:  Frantz Shelley MD Provider:  Yasmani Reid MD    Anesthesia Type:  general ASA Status:  3          Anesthesia Type: general  Last vitals  BP   111/55 (04/18/18 0737)   Temp   97.1 °F (36.2 °C) (04/18/18 0737)   Pulse   61 (04/18/18 0737)   Resp   20 (04/18/18 0737)     SpO2   96 % (04/18/18 0737)     Post Anesthesia Care and Evaluation    Patient location during evaluation: PACU  Patient participation: complete - patient cannot participate  Level of consciousness: obtunded/minimal responses  Pain score: 0  Pain management: adequate  Airway patency: patent  Anesthetic complications: No anesthetic complications  PONV Status: none  Cardiovascular status: acceptable  Respiratory status: acceptable  Hydration status: acceptable

## 2018-04-18 NOTE — ANESTHESIA PROCEDURE NOTES
Arterial Line    Patient location during procedure: OR  Start time: 4/18/2018 9:44 AM   Performed By   Anesthesiologist: BOUBACAR MCMULLEN  Preanesthetic Checklist  Completed: patient identified, site marked, surgical consent, pre-op evaluation, timeout performed, IV checked, risks and benefits discussed and monitors and equipment checked  Arterial Line Prep   Prep: ChloraPrep  Patient monitoring: blood pressure monitoring, continuous pulse oximetry and EKG  Arterial Line Procedure   Laterality:right  Location:  radial artery  Catheter size: 20 G   Guidance: landmark technique and palpation technique  Number of attempts: multiple attempts.  Successful placement: yes          Post Assessment   Dressing Type: occlusive dressing applied, secured with tape and wrist guard applied.   Complications no  Circ/Move/Sens Assessment: normal.   Patient Tolerance: patient tolerated the procedure well with no apparent complications

## 2018-04-18 NOTE — PROGRESS NOTES
CHIEF COMPLAINT:    New Right Colon cancer    HISTORY OF PRESENT ILLNESS:    Sunny Ernandez is a 78 y.o. male who recently underwent colonoscopy with Dr. Goins for occult blood in his stool.  He has a history of a prior transverse colon resection by Dr. Harper in 2012 for cancer.  He has been under surveillance since that time and his last colonoscopy in 2016 was normal.      On his most recent colonoscopy he had several adenomas, as well as a fairly large lesion at the ileocecal valve.  On biopsy this proved to be adenocarcinoma.  He underwent CT scan showing incisional hernia containing bowel and a cecal mass, there was no evidence of metastatic disease.  His CEA earlier this year was normal at 0.6.    Overall he states he has been feeling well.    Past Medical History:   Diagnosis Date   • Colon cancer    • Hernia    • High cholesterol        Past Surgical History:   Procedure Laterality Date   • COLON RESECTION  2012   • COLON SURGERY     • COLONOSCOPY     • COLONOSCOPY N/A 4/4/2018    Procedure: COLONOSCOPY;  Surgeon: Austin Gonis DO;  Location: [facility] ENDOSCOPY;  Service: Gastroenterology   • ENDOSCOPY N/A 4/4/2018    Procedure: ESOPHAGOGASTRODUODENOSCOPY;  Surgeon: Austin Goins DO;  Location: [facility] ENDOSCOPY;  Service: Gastroenterology   • FINGER SURGERY         Prior to Admission medications    Medication Sig Start Date End Date Taking? Authorizing Provider   aspirin 81 MG EC tablet Take 81 mg by mouth Every Night.   Yes Historical Provider, MD   Cyanocobalamin (B-12 COMPLIANCE INJECTION) 1000 MCG/ML kit Inject 1,000 mcg as directed Every 30 (Thirty) Days.   Yes Historical Provider, MD   SIMVASTATIN PO Take 40 mg by mouth Every Night.   Yes Historical Provider, MD   Ergocalciferol (VITAMIN D2 PO) Take 50,000 Units by mouth 2 (Two) Times a Week.    Historical Provider, MD       No Known Allergies    Family History   Problem Relation Age of Onset   • Lung cancer Mother        Social  "History     Social History   • Marital status:      Spouse name: N/A   • Number of children: N/A   • Years of education: N/A     Occupational History   • Not on file.     Social History Main Topics   • Smoking status: Former Smoker     Packs/day: 1.00     Years: 30.00     Types: Cigarettes     Quit date: 1997   • Smokeless tobacco: Current User     Types: Chew   • Alcohol use No   • Drug use: No   • Sexual activity: Defer     Other Topics Concern   • Not on file     Social History Narrative   • No narrative on file       Review of Systems   Constitutional: Negative for activity change, appetite change, chills and fever.   HENT: Negative for hearing loss, nosebleeds and trouble swallowing.    Cardiovascular: Negative for chest pain, palpitations and leg swelling.   Gastrointestinal: Negative for abdominal distention, abdominal pain, anal bleeding, blood in stool, constipation, diarrhea, nausea, rectal pain and vomiting.   Endocrine: Negative for cold intolerance, heat intolerance, polydipsia and polyuria.   Genitourinary: Negative for decreased urine volume, difficulty urinating, dysuria, enuresis, frequency, hematuria and urgency.   Musculoskeletal: Positive for arthralgias and back pain. Negative for gait problem, myalgias and neck pain.   Skin: Negative for pallor, rash and wound.   Allergic/Immunologic: Negative for immunocompromised state.   Neurological: Negative for dizziness, seizures, weakness, light-headedness, numbness and headaches.   Psychiatric/Behavioral: Negative for agitation and behavioral problems. The patient is not nervous/anxious.        Objective     /80   Ht 181.6 cm (71.5\")   Wt 114 kg (252 lb)   BMI 34.66 kg/m²     Physical Exam   Constitutional: He is oriented to person, place, and time. He appears well-developed and well-nourished.   HENT:   Head: Normocephalic and atraumatic.   Nose: Nose normal.   Eyes: Conjunctivae and EOM are normal. Right eye exhibits no discharge. " Left eye exhibits no discharge.   Neck: Trachea normal, normal range of motion and phonation normal. Neck supple. No JVD present. No tracheal deviation and no edema present. No thyromegaly present.   Cardiovascular: Normal rate, regular rhythm and normal heart sounds.  Exam reveals no gallop and no friction rub.    No murmur heard.  Pulmonary/Chest: Effort normal and breath sounds normal. No accessory muscle usage. No respiratory distress. He has no decreased breath sounds. He has no wheezes. He has no rales. He exhibits no tenderness.   Abdominal: Soft. He exhibits no distension, no fluid wave, no ascites, no pulsatile midline mass and no mass. There is no tenderness. There is no rebound and no guarding. A hernia is present. Hernia confirmed positive in the ventral area.       Musculoskeletal: Normal range of motion. He exhibits no edema, tenderness or deformity.   Lymphadenopathy:     He has no cervical adenopathy.        Left: No supraclavicular adenopathy present.   Neurological: He is alert and oriented to person, place, and time. He has normal strength. No cranial nerve deficit.   Skin: Skin is warm and dry. No rash noted. He is not diaphoretic. No erythema. No pallor.   Psychiatric: He has a normal mood and affect. His speech is normal and behavior is normal. Judgment and thought content normal. Cognition and memory are normal.   Vitals reviewed.      DIAGNOSTIC DATA:    CT reviewed as above.   Labs reviewed, CEA 0.6    ASSESSMENT:    New right colon cancer in a patient with history of transverse colon cancer  Multiple incisional hernias    PLAN:    I had a long discussion today with the patient and his grandsons.  They understand his diagnosis as he has had colon cancer before.  I discussed with him that normally we would perform a subtotal colectomy given that this is his second colon cancer.  However, given his age and the morbidity of a subtotal colectomy I have suggested that he undergo a right colon  resection.  He is agreeable to this.  He will need concominant repair of his incisional hernias.  The risks and benefits of open right colon resection with repair of incisional hernias was discussed and he is agreeable with proceeding.          This document has been electronically signed by Frantz Shelley MD on April 18, 2018 9:02 AM

## 2018-04-18 NOTE — PLAN OF CARE
Problem: Patient Care Overview  Goal: Plan of Care Review  Outcome: Ongoing (interventions implemented as appropriate)    Goal: Individualization and Mutuality  Outcome: Ongoing (interventions implemented as appropriate)    Goal: Discharge Needs Assessment  Outcome: Ongoing (interventions implemented as appropriate)      Problem: Surgery Nonspecified (Adult)  Goal: Signs and Symptoms of Listed Potential Problems Will be Absent, Minimized or Managed (Surgery Nonspecified)  Outcome: Ongoing (interventions implemented as appropriate)    Goal: Anesthesia/Sedation Recovery  Outcome: Outcome(s) achieved Date Met: 04/18/18      Problem: Fall Risk (Adult)  Goal: Identify Related Risk Factors and Signs and Symptoms  Outcome: Outcome(s) achieved Date Met: 04/18/18    Goal: Absence of Fall  Outcome: Ongoing (interventions implemented as appropriate)

## 2018-04-18 NOTE — H&P (VIEW-ONLY)
CHIEF COMPLAINT:    New Right Colon cancer    HISTORY OF PRESENT ILLNESS:    Sunny Ernandez is a 78 y.o. male who recently underwent colonoscopy with Dr. Goins for occult blood in his stool.  He has a history of a prior transverse colon resection by Dr. Harper in 2012 for cancer.  He has been under surveillance since that time and his last colonoscopy in 2016 was normal.      On his most recent colonoscopy he had several adenomas, as well as a fairly large lesion at the ileocecal valve.  On biopsy this proved to be adenocarcinoma.  He underwent CT scan showing incisional hernia containing bowel and a cecal mass, there was no evidence of metastatic disease.  His CEA earlier this year was normal at 0.6.    Overall he states he has been feeling well.    Past Medical History:   Diagnosis Date   • Colon cancer    • Hernia    • High cholesterol        Past Surgical History:   Procedure Laterality Date   • COLON RESECTION  2012   • COLON SURGERY     • COLONOSCOPY     • COLONOSCOPY N/A 4/4/2018    Procedure: COLONOSCOPY;  Surgeon: Austin Goins DO;  Location: [facility] ENDOSCOPY;  Service: Gastroenterology   • ENDOSCOPY N/A 4/4/2018    Procedure: ESOPHAGOGASTRODUODENOSCOPY;  Surgeon: Austin Goins DO;  Location: [facility] ENDOSCOPY;  Service: Gastroenterology   • FINGER SURGERY         Prior to Admission medications    Medication Sig Start Date End Date Taking? Authorizing Provider   aspirin 81 MG EC tablet Take 81 mg by mouth Every Night.   Yes Historical Provider, MD   Cyanocobalamin (B-12 COMPLIANCE INJECTION) 1000 MCG/ML kit Inject 1,000 mcg as directed Every 30 (Thirty) Days.   Yes Historical Provider, MD   SIMVASTATIN PO Take 40 mg by mouth Every Night.   Yes Historical Provider, MD   Ergocalciferol (VITAMIN D2 PO) Take 50,000 Units by mouth 2 (Two) Times a Week.    Historical Provider, MD       No Known Allergies    Family History   Problem Relation Age of Onset   • Lung cancer Mother        Social  "History     Social History   • Marital status:      Spouse name: N/A   • Number of children: N/A   • Years of education: N/A     Occupational History   • Not on file.     Social History Main Topics   • Smoking status: Former Smoker     Packs/day: 1.00     Years: 30.00     Types: Cigarettes     Quit date: 1997   • Smokeless tobacco: Current User     Types: Chew   • Alcohol use No   • Drug use: No   • Sexual activity: Defer     Other Topics Concern   • Not on file     Social History Narrative   • No narrative on file       Review of Systems   Constitutional: Negative for activity change, appetite change, chills and fever.   HENT: Negative for hearing loss, nosebleeds and trouble swallowing.    Cardiovascular: Negative for chest pain, palpitations and leg swelling.   Gastrointestinal: Negative for abdominal distention, abdominal pain, anal bleeding, blood in stool, constipation, diarrhea, nausea, rectal pain and vomiting.   Endocrine: Negative for cold intolerance, heat intolerance, polydipsia and polyuria.   Genitourinary: Negative for decreased urine volume, difficulty urinating, dysuria, enuresis, frequency, hematuria and urgency.   Musculoskeletal: Positive for arthralgias and back pain. Negative for gait problem, myalgias and neck pain.   Skin: Negative for pallor, rash and wound.   Allergic/Immunologic: Negative for immunocompromised state.   Neurological: Negative for dizziness, seizures, weakness, light-headedness, numbness and headaches.   Psychiatric/Behavioral: Negative for agitation and behavioral problems. The patient is not nervous/anxious.        Objective     /80   Ht 181.6 cm (71.5\")   Wt 114 kg (252 lb)   BMI 34.66 kg/m²     Physical Exam   Constitutional: He is oriented to person, place, and time. He appears well-developed and well-nourished.   HENT:   Head: Normocephalic and atraumatic.   Nose: Nose normal.   Eyes: Conjunctivae and EOM are normal. Right eye exhibits no discharge. " Left eye exhibits no discharge.   Neck: Trachea normal, normal range of motion and phonation normal. Neck supple. No JVD present. No tracheal deviation and no edema present. No thyromegaly present.   Cardiovascular: Normal rate, regular rhythm and normal heart sounds.  Exam reveals no gallop and no friction rub.    No murmur heard.  Pulmonary/Chest: Effort normal and breath sounds normal. No accessory muscle usage. No respiratory distress. He has no decreased breath sounds. He has no wheezes. He has no rales. He exhibits no tenderness.   Abdominal: Soft. He exhibits no distension, no fluid wave, no ascites, no pulsatile midline mass and no mass. There is no tenderness. There is no rebound and no guarding. A hernia is present. Hernia confirmed positive in the ventral area.       Musculoskeletal: Normal range of motion. He exhibits no edema, tenderness or deformity.   Lymphadenopathy:     He has no cervical adenopathy.        Left: No supraclavicular adenopathy present.   Neurological: He is alert and oriented to person, place, and time. He has normal strength. No cranial nerve deficit.   Skin: Skin is warm and dry. No rash noted. He is not diaphoretic. No erythema. No pallor.   Psychiatric: He has a normal mood and affect. His speech is normal and behavior is normal. Judgment and thought content normal. Cognition and memory are normal.   Vitals reviewed.      DIAGNOSTIC DATA:    CT reviewed as above.   Labs reviewed, CEA 0.6    ASSESSMENT:    New right colon cancer in a patient with history of transverse colon cancer  Multiple incisional hernias    PLAN:    I had a long discussion today with the patient and his grandsons.  They understand his diagnosis as he has had colon cancer before.  I discussed with him that normally we would perform a subtotal colectomy given that this is his second colon cancer.  However, given his age and the morbidity of a subtotal colectomy I have suggested that he undergo a right colon  resection.  He is agreeable to this.  He will need concominant repair of his incisional hernias.  The risks and benefits of open right colon resection with repair of incisional hernias was discussed and he is agreeable with proceeding.          This document has been electronically signed by Frantz Shelley MD on April 18, 2018 9:02 AM

## 2018-04-18 NOTE — ANESTHESIA PROCEDURE NOTES
Airway    General Information and Staff    Patient location during procedure: OR    Indications and Patient Condition  Indications for airway management: airway protection    Preoxygenated: yes  MILS maintained throughout  Mask difficulty assessment: 0 - not attempted    Final Airway Details  Final airway type: endotracheal airway      Successful airway: ETT  Cuffed: yes   Successful intubation technique: direct laryngoscopy  Blade: Noelle  Blade size: #3  ETT size: 8.0 mm  Cormack-Lehane Classification: grade I - full view of glottis  Placement verified by: chest auscultation and capnometry   Measured from: gums  ETT to gums (cm): 21  Number of attempts at approach: 1

## 2018-04-18 NOTE — ANESTHESIA PREPROCEDURE EVALUATION
Anesthesia Evaluation     NPO Solid Status: > 8 hours  NPO Liquid Status: > 8 hours           Airway   Mallampati: II  TM distance: >3 FB  Neck ROM: full  No difficulty expected  Dental    (+) poor dentition    Pulmonary     breath sounds clear to auscultation  (+) a smoker Former, COPD mild,   Cardiovascular   Exercise tolerance: poor (<4 METS)    ECG reviewed  PT is on anticoagulation therapy  Rhythm: regular  Rate: normal    (+) hypertension well controlled, hyperlipidemia,       Neuro/Psych  (+) psychiatric history Depression,     GI/Hepatic/Renal/Endo    (+) obesity,  GERD poorly controlled,      Musculoskeletal     (+) arthralgias, back pain,   Abdominal    Substance History      OB/GYN          Other   (+) arthritis   history of cancer active                    Anesthesia Plan    ASA 3     general   Rapid sequence(Patient states he will accept blood products. Type and cross ordered to be drawn in preop area with blood available for the or later today.)  intravenous induction   Anesthetic plan and risks discussed with patient.  Use of blood products discussed with patient and child .

## 2018-04-19 LAB
ALBUMIN SERPL-MCNC: 3.1 G/DL (ref 3.4–4.8)
ALBUMIN/GLOB SERPL: 1.2 G/DL (ref 1.1–1.8)
ALP SERPL-CCNC: 47 U/L (ref 38–126)
ALT SERPL W P-5'-P-CCNC: 18 U/L (ref 21–72)
ANION GAP SERPL CALCULATED.3IONS-SCNC: 10 MMOL/L (ref 5–15)
AST SERPL-CCNC: 26 U/L (ref 17–59)
BASOPHILS # BLD AUTO: 0 10*3/MM3 (ref 0–0.2)
BASOPHILS NFR BLD AUTO: 0 % (ref 0–2)
BILIRUB SERPL-MCNC: 0.6 MG/DL (ref 0.2–1.3)
BUN BLD-MCNC: 14 MG/DL (ref 7–21)
BUN/CREAT SERPL: 13.7 (ref 7–25)
CALCIUM SPEC-SCNC: 8 MG/DL (ref 8.4–10.2)
CHLORIDE SERPL-SCNC: 103 MMOL/L (ref 95–110)
CO2 SERPL-SCNC: 23 MMOL/L (ref 22–31)
CREAT BLD-MCNC: 1.02 MG/DL (ref 0.7–1.3)
DEPRECATED RDW RBC AUTO: 37.9 FL (ref 35.1–43.9)
EOSINOPHIL # BLD AUTO: 0 10*3/MM3 (ref 0–0.7)
EOSINOPHIL NFR BLD AUTO: 0 % (ref 0–7)
ERYTHROCYTE [DISTWIDTH] IN BLOOD BY AUTOMATED COUNT: 13.3 % (ref 11.5–14.5)
GFR SERPL CREATININE-BSD FRML MDRD: 71 ML/MIN/1.73
GLOBULIN UR ELPH-MCNC: 2.6 GM/DL (ref 2.3–3.5)
GLUCOSE BLD-MCNC: 133 MG/DL (ref 60–100)
HCT VFR BLD AUTO: 32.1 % (ref 39–49)
HGB BLD-MCNC: 10.7 G/DL (ref 13.7–17.3)
IMM GRANULOCYTES # BLD: 0.05 10*3/MM3 (ref 0–0.02)
IMM GRANULOCYTES NFR BLD: 0.4 % (ref 0–0.5)
LYMPHOCYTES # BLD AUTO: 0.57 10*3/MM3 (ref 0.6–4.2)
LYMPHOCYTES NFR BLD AUTO: 4.7 % (ref 10–50)
MCH RBC QN AUTO: 26 PG (ref 26.5–34)
MCHC RBC AUTO-ENTMCNC: 33.3 G/DL (ref 31.5–36.3)
MCV RBC AUTO: 78.1 FL (ref 80–98)
MONOCYTES # BLD AUTO: 1.03 10*3/MM3 (ref 0–0.9)
MONOCYTES NFR BLD AUTO: 8.4 % (ref 0–12)
NEUTROPHILS # BLD AUTO: 10.54 10*3/MM3 (ref 2–8.6)
NEUTROPHILS NFR BLD AUTO: 86.5 % (ref 37–80)
PLATELET # BLD AUTO: 238 10*3/MM3 (ref 150–450)
PMV BLD AUTO: 11.7 FL (ref 8–12)
POTASSIUM BLD-SCNC: 4.7 MMOL/L (ref 3.5–5.1)
PROT SERPL-MCNC: 5.7 G/DL (ref 6.3–8.6)
RBC # BLD AUTO: 4.11 10*6/MM3 (ref 4.37–5.74)
SODIUM BLD-SCNC: 136 MMOL/L (ref 137–145)
WBC NRBC COR # BLD: 12.19 10*3/MM3 (ref 3.2–9.8)

## 2018-04-19 PROCEDURE — 25010000002 CEFOXITIN PER 1 G: Performed by: SURGERY

## 2018-04-19 PROCEDURE — 25010000003 MORPHINE PER 10 MG: Performed by: SURGERY

## 2018-04-19 PROCEDURE — 99024 POSTOP FOLLOW-UP VISIT: CPT | Performed by: SURGERY

## 2018-04-19 PROCEDURE — 85025 COMPLETE CBC W/AUTO DIFF WBC: CPT | Performed by: SURGERY

## 2018-04-19 PROCEDURE — 80053 COMPREHEN METABOLIC PANEL: CPT | Performed by: SURGERY

## 2018-04-19 PROCEDURE — 94760 N-INVAS EAR/PLS OXIMETRY 1: CPT

## 2018-04-19 PROCEDURE — 25010000002 ENOXAPARIN PER 10 MG: Performed by: SURGERY

## 2018-04-19 PROCEDURE — 94799 UNLISTED PULMONARY SVC/PX: CPT

## 2018-04-19 RX ORDER — FAMOTIDINE 20 MG/50ML
20 INJECTION, SOLUTION INTRAVENOUS EVERY 12 HOURS SCHEDULED
Status: DISCONTINUED | OUTPATIENT
Start: 2018-04-19 | End: 2018-04-23 | Stop reason: HOSPADM

## 2018-04-19 RX ADMIN — SODIUM CHLORIDE 125 ML/HR: 9 INJECTION, SOLUTION INTRAVENOUS at 20:43

## 2018-04-19 RX ADMIN — CEFOXITIN 2 G: 2 INJECTION, POWDER, FOR SOLUTION INTRAVENOUS at 04:45

## 2018-04-19 RX ADMIN — SODIUM CHLORIDE 125 ML/HR: 9 INJECTION, SOLUTION INTRAVENOUS at 01:54

## 2018-04-19 RX ADMIN — MORPHINE SULFATE: 1 INJECTION, SOLUTION INTRAVENOUS at 13:31

## 2018-04-19 RX ADMIN — FAMOTIDINE 20 MG: 20 INJECTION, SOLUTION INTRAVENOUS at 08:34

## 2018-04-19 RX ADMIN — FAMOTIDINE 20 MG: 20 INJECTION, SOLUTION INTRAVENOUS at 22:33

## 2018-04-19 RX ADMIN — SODIUM CHLORIDE 125 ML/HR: 9 INJECTION, SOLUTION INTRAVENOUS at 13:04

## 2018-04-19 RX ADMIN — ENOXAPARIN SODIUM 40 MG: 40 INJECTION SUBCUTANEOUS at 08:34

## 2018-04-19 NOTE — PLAN OF CARE
Problem: Patient Care Overview  Goal: Plan of Care Review  Outcome: Ongoing (interventions implemented as appropriate)   04/19/18 0146   Coping/Psychosocial   Plan of Care Reviewed With patient   Plan of Care Review   Progress improving   OTHER   Outcome Summary vss, pain well controlled on morphine PCA, compliant w/turn, cough, DVT prevention tolerated, agrees w/plan to get OOB in am of 4/19     Goal: Individualization and Mutuality   04/18/18 1843 04/19/18 0146   Individualization   Patient Specific Preferences Likes to be called Franklyn --    Patient Specific Goals (Include Timeframe) --  plan to be up in chair in am of 4/19     Goal: Discharge Needs Assessment  Outcome: Ongoing (interventions implemented as appropriate)    Goal: Interprofessional Rounds/Family Conf  Outcome: Ongoing (interventions implemented as appropriate)      Problem: Surgery Nonspecified (Adult)  Goal: Signs and Symptoms of Listed Potential Problems Will be Absent, Minimized or Managed (Surgery Nonspecified)  Outcome: Ongoing (interventions implemented as appropriate)      Problem: Fall Risk (Adult)  Goal: Absence of Fall  Outcome: Ongoing (interventions implemented as appropriate)      Problem: Skin Injury Risk (Adult)  Goal: Identify Related Risk Factors and Signs and Symptoms  Outcome: Ongoing (interventions implemented as appropriate)    Goal: Skin Health and Integrity  Outcome: Ongoing (interventions implemented as appropriate)

## 2018-04-19 NOTE — NURSING NOTE
Pt up to new enriqueta-chair after bath. Ambulated to chair with stand-by assist. Pt states chair is very comfortable. Elevated foot rest. Pt requests staying in chair instead of going back to bed at this time. Call light in reach. PCA in reach.

## 2018-04-19 NOTE — PROGRESS NOTES
GENERAL SURGERY PROGRESS NOTE  Chief Complaint:  Surgery Follow up   LOS: 1 day       Subjective     Interval History:     Feels well, pain controlled. No complaints. Sitting in chair this AM. Wants to walk.    Objective     Vital Signs  Temp:  [97 °F (36.1 °C)-98.7 °F (37.1 °C)] 98.7 °F (37.1 °C)  Heart Rate:  [] 80  Resp:  [14-26] 15  BP: (106-130)/(68-98) 111/68  Arterial Line BP: ()/(63-95) 111/95    Physical Exam:   Abdomen soft, appropriate  Labs:  Lab Results (last 24 hours)     Procedure Component Value Units Date/Time    Comprehensive Metabolic Panel [[id number]]  (Abnormal) Collected:  04/19/18 0342    Specimen:  Blood Updated:  04/19/18 0455     Glucose 133 (H) mg/dL      BUN 14 mg/dL      Creatinine 1.02 mg/dL      Sodium 136 (L) mmol/L      Potassium 4.7 mmol/L      Chloride 103 mmol/L      CO2 23.0 mmol/L      Calcium 8.0 (L) mg/dL      Total Protein 5.7 (L) g/dL      Albumin 3.10 (L) g/dL      ALT (SGPT) 18 (L) U/L      AST (SGOT) 26 U/L      Alkaline Phosphatase 47 U/L      Total Bilirubin 0.6 mg/dL      eGFR Non African Amer 71 mL/min/1.73      Globulin 2.6 gm/dL      A/G Ratio 1.2 g/dL      BUN/Creatinine Ratio 13.7     Anion Gap 10.0 mmol/L     Narrative:       The MDRD GFR formula is only valid for adults with stable renal function between ages 18 and 70.    CBC & Differential [[id number]] Collected:  04/19/18 0342    Specimen:  Blood Updated:  04/19/18 0425    Narrative:       The following orders were created for panel order CBC & Differential.  Procedure                               Abnormality         Status                     ---------                               -----------         ------                     CBC Auto Differential[[id number]]        Abnormal            Final result                 Please view results for these tests on the individual orders.    CBC Auto Differential [[id number]]  (Abnormal) Collected:  04/19/18 0342    Specimen:  Blood Updated:  04/19/18 0425      WBC 12.19 (H) 10*3/mm3      RBC 4.11 (L) 10*6/mm3      Hemoglobin 10.7 (L) g/dL      Hematocrit 32.1 (L) %      MCV 78.1 (L) fL      MCH 26.0 (L) pg      MCHC 33.3 g/dL      RDW 13.3 %      RDW-SD 37.9 fl      MPV 11.7 fL      Platelets 238 10*3/mm3      Neutrophil % 86.5 (H) %      Lymphocyte % 4.7 (L) %      Monocyte % 8.4 %      Eosinophil % 0.0 %      Basophil % 0.0 %      Immature Grans % 0.4 %      Neutrophils, Absolute 10.54 (H) 10*3/mm3      Lymphocytes, Absolute 0.57 (L) 10*3/mm3      Monocytes, Absolute 1.03 (H) 10*3/mm3      Eosinophils, Absolute 0.00 10*3/mm3      Basophils, Absolute 0.00 10*3/mm3      Immature Grans, Absolute 0.05 (H) 10*3/mm3            Results Review:     Labs and imaging for today were reviewed.    Assessment/Plan     Sunny Ernandez is a 78 y.o. male who is POD#1 Right Colon resection, VHR      DC NGT and Partida.  Ambulate.  Transfer to floor.          This document has been electronically signed by Frantz Shelley MD on April 19, 2018 7:56 AM        Frantz Shelley MD  04/19/18  7:56 AM

## 2018-04-20 LAB
ANION GAP SERPL CALCULATED.3IONS-SCNC: 10 MMOL/L (ref 5–15)
BUN BLD-MCNC: 19 MG/DL (ref 7–21)
BUN/CREAT SERPL: 19 (ref 7–25)
CALCIUM SPEC-SCNC: 7.7 MG/DL (ref 8.4–10.2)
CHLORIDE SERPL-SCNC: 102 MMOL/L (ref 95–110)
CO2 SERPL-SCNC: 24 MMOL/L (ref 22–31)
CREAT BLD-MCNC: 1 MG/DL (ref 0.7–1.3)
DEPRECATED RDW RBC AUTO: 38.9 FL (ref 35.1–43.9)
ERYTHROCYTE [DISTWIDTH] IN BLOOD BY AUTOMATED COUNT: 13.5 % (ref 11.5–14.5)
GFR SERPL CREATININE-BSD FRML MDRD: 72 ML/MIN/1.73 (ref 42–98)
GLUCOSE BLD-MCNC: 97 MG/DL (ref 60–100)
HCT VFR BLD AUTO: 27.7 % (ref 39–49)
HGB BLD-MCNC: 9.3 G/DL (ref 13.7–17.3)
MCH RBC QN AUTO: 26.4 PG (ref 26.5–34)
MCHC RBC AUTO-ENTMCNC: 33.6 G/DL (ref 31.5–36.3)
MCV RBC AUTO: 78.7 FL (ref 80–98)
PLATELET # BLD AUTO: 194 10*3/MM3 (ref 150–450)
PMV BLD AUTO: 12.1 FL (ref 8–12)
POTASSIUM BLD-SCNC: 4.5 MMOL/L (ref 3.5–5.1)
RBC # BLD AUTO: 3.52 10*6/MM3 (ref 4.37–5.74)
SODIUM BLD-SCNC: 136 MMOL/L (ref 137–145)
WBC NRBC COR # BLD: 9.01 10*3/MM3 (ref 3.2–9.8)

## 2018-04-20 PROCEDURE — 25010000002 ENOXAPARIN PER 10 MG: Performed by: SURGERY

## 2018-04-20 PROCEDURE — 80048 BASIC METABOLIC PNL TOTAL CA: CPT | Performed by: SURGERY

## 2018-04-20 PROCEDURE — 85027 COMPLETE CBC AUTOMATED: CPT | Performed by: SURGERY

## 2018-04-20 PROCEDURE — 25010000003 MORPHINE PER 10 MG: Performed by: SURGERY

## 2018-04-20 PROCEDURE — 25010000002 ONDANSETRON PER 1 MG: Performed by: SURGERY

## 2018-04-20 PROCEDURE — 99024 POSTOP FOLLOW-UP VISIT: CPT | Performed by: SURGERY

## 2018-04-20 RX ADMIN — FAMOTIDINE 20 MG: 20 INJECTION, SOLUTION INTRAVENOUS at 20:11

## 2018-04-20 RX ADMIN — ONDANSETRON HYDROCHLORIDE 4 MG: 2 INJECTION INTRAMUSCULAR; INTRAVENOUS at 23:44

## 2018-04-20 RX ADMIN — SODIUM CHLORIDE 125 ML/HR: 9 INJECTION, SOLUTION INTRAVENOUS at 04:29

## 2018-04-20 RX ADMIN — ONDANSETRON HYDROCHLORIDE 4 MG: 2 INJECTION INTRAMUSCULAR; INTRAVENOUS at 04:40

## 2018-04-20 RX ADMIN — SODIUM CHLORIDE 75 ML/HR: 9 INJECTION, SOLUTION INTRAVENOUS at 12:19

## 2018-04-20 RX ADMIN — ENOXAPARIN SODIUM 40 MG: 40 INJECTION SUBCUTANEOUS at 09:10

## 2018-04-20 RX ADMIN — MORPHINE SULFATE: 1 INJECTION, SOLUTION INTRAVENOUS at 21:05

## 2018-04-20 NOTE — PROGRESS NOTES
GENERAL SURGERY PROGRESS NOTE  Chief Complaint:  Surgery Follow up   LOS: 2 days       Subjective     Interval History:     Feels well, no BM yet. No nausea.     Objective     Vital Signs  Temp:  [96.4 °F (35.8 °C)-98.6 °F (37 °C)] 98.6 °F (37 °C)  Heart Rate:  [] 94  Resp:  [18] 18  BP: (122-169)/(73-94) 144/79    Physical Exam:   Abdomen soft, XIOMARA serous. Incision CDI  Labs:  Lab Results (last 24 hours)     Procedure Component Value Units Date/Time    CBC (No Diff) [[id number]]  (Abnormal) Collected:  04/20/18 0533    Specimen:  Blood Updated:  04/20/18 0748     WBC 9.01 10*3/mm3      RBC 3.52 (L) 10*6/mm3      Hemoglobin 9.3 (L) g/dL      Hematocrit 27.7 (L) %      MCV 78.7 (L) fL      MCH 26.4 (L) pg      MCHC 33.6 g/dL      RDW 13.5 %      RDW-SD 38.9 fl      MPV 12.1 (H) fL      Platelets 194 10*3/mm3     Basic Metabolic Panel [[id number]]  (Abnormal) Collected:  04/20/18 0533    Specimen:  Blood Updated:  04/20/18 0703     Glucose 97 mg/dL      BUN 19 mg/dL      Creatinine 1.00 mg/dL      Sodium 136 (L) mmol/L      Potassium 4.5 mmol/L      Chloride 102 mmol/L      CO2 24.0 mmol/L      Calcium 7.7 (L) mg/dL      eGFR Non African Amer 72 mL/min/1.73      BUN/Creatinine Ratio 19.0     Anion Gap 10.0 mmol/L     Narrative:       The MDRD GFR formula is only valid for adults with stable renal function between ages 18 and 70.           Results Review:     Labs and imaging for today were reviewed.    Assessment/Plan     Sunny Ernandez is a 78 y.o. male who is s/p Open Right Colon resection and VHR      Keep subcutaneous XIOMARA.  Reduce IVF  PT/OT for mobility  Await bowel function          This document has been electronically signed by Frantz Shelley MD on April 20, 2018 11:06 AM        Frantz Shelley MD  04/20/18  11:06 AM

## 2018-04-20 NOTE — PLAN OF CARE
Problem: Patient Care Overview  Goal: Plan of Care Review  Outcome: Ongoing (interventions implemented as appropriate)      Problem: Fall Risk (Adult)  Goal: Absence of Fall  Outcome: Ongoing (interventions implemented as appropriate)      Problem: Skin Injury Risk (Adult)  Goal: Identify Related Risk Factors and Signs and Symptoms  Outcome: Outcome(s) achieved Date Met: 04/20/18    Goal: Skin Health and Integrity  Outcome: Ongoing (interventions implemented as appropriate)      Problem: Bowel Resection (Adult)  Goal: Signs and Symptoms of Listed Potential Problems Will be Absent, Minimized or Managed (Bowel Resection)  Outcome: Ongoing (interventions implemented as appropriate)

## 2018-04-20 NOTE — PLAN OF CARE
Problem: Patient Care Overview  Goal: Plan of Care Review  Outcome: Ongoing (interventions implemented as appropriate)   04/20/18 0501   Coping/Psychosocial   Plan of Care Reviewed With patient   Plan of Care Review   Progress no change   OTHER   Outcome Summary vitals stable, pain is controlled with meds, dangled on side of bed     Goal: Individualization and Mutuality  Outcome: Ongoing (interventions implemented as appropriate)    Goal: Discharge Needs Assessment  Outcome: Ongoing (interventions implemented as appropriate)    Goal: Interprofessional Rounds/Family Conf  Outcome: Ongoing (interventions implemented as appropriate)      Problem: Surgery Nonspecified (Adult)  Goal: Signs and Symptoms of Listed Potential Problems Will be Absent, Minimized or Managed (Surgery Nonspecified)  Outcome: Ongoing (interventions implemented as appropriate)      Problem: Fall Risk (Adult)  Goal: Absence of Fall  Outcome: Ongoing (interventions implemented as appropriate)      Problem: Skin Injury Risk (Adult)  Goal: Identify Related Risk Factors and Signs and Symptoms  Outcome: Ongoing (interventions implemented as appropriate)    Goal: Skin Health and Integrity  Outcome: Ongoing (interventions implemented as appropriate)      Problem: Bowel Resection (Adult)  Goal: Signs and Symptoms of Listed Potential Problems Will be Absent, Minimized or Managed (Bowel Resection)  Outcome: Ongoing (interventions implemented as appropriate)

## 2018-04-21 LAB
ABO + RH BLD: NORMAL
ABO + RH BLD: NORMAL
ANION GAP SERPL CALCULATED.3IONS-SCNC: 14 MMOL/L (ref 5–15)
BH BB BLOOD EXPIRATION DATE: NORMAL
BH BB BLOOD EXPIRATION DATE: NORMAL
BH BB BLOOD TYPE BARCODE: 6200
BH BB BLOOD TYPE BARCODE: 6200
BH BB DISPENSE STATUS: NORMAL
BH BB DISPENSE STATUS: NORMAL
BH BB PRODUCT CODE: NORMAL
BH BB PRODUCT CODE: NORMAL
BH BB UNIT NUMBER: NORMAL
BH BB UNIT NUMBER: NORMAL
BUN BLD-MCNC: 16 MG/DL (ref 7–21)
BUN/CREAT SERPL: 16.7 (ref 7–25)
CALCIUM SPEC-SCNC: 7.6 MG/DL (ref 8.4–10.2)
CHLORIDE SERPL-SCNC: 100 MMOL/L (ref 95–110)
CO2 SERPL-SCNC: 21 MMOL/L (ref 22–31)
CREAT BLD-MCNC: 0.96 MG/DL (ref 0.7–1.3)
DEPRECATED RDW RBC AUTO: 38.5 FL (ref 35.1–43.9)
ERYTHROCYTE [DISTWIDTH] IN BLOOD BY AUTOMATED COUNT: 13.4 % (ref 11.5–14.5)
GFR SERPL CREATININE-BSD FRML MDRD: 76 ML/MIN/1.73 (ref 42–98)
GLUCOSE BLD-MCNC: 87 MG/DL (ref 60–100)
HCT VFR BLD AUTO: 28.1 % (ref 39–49)
HGB BLD-MCNC: 9.3 G/DL (ref 13.7–17.3)
MCH RBC QN AUTO: 26.1 PG (ref 26.5–34)
MCHC RBC AUTO-ENTMCNC: 33.1 G/DL (ref 31.5–36.3)
MCV RBC AUTO: 78.9 FL (ref 80–98)
PLATELET # BLD AUTO: 196 10*3/MM3 (ref 150–450)
PMV BLD AUTO: 11.8 FL (ref 8–12)
POTASSIUM BLD-SCNC: 4.3 MMOL/L (ref 3.5–5.1)
RBC # BLD AUTO: 3.56 10*6/MM3 (ref 4.37–5.74)
SODIUM BLD-SCNC: 135 MMOL/L (ref 137–145)
UNIT  ABO: NORMAL
UNIT  ABO: NORMAL
UNIT  RH: NORMAL
UNIT  RH: NORMAL
WBC NRBC COR # BLD: 7.94 10*3/MM3 (ref 3.2–9.8)

## 2018-04-21 PROCEDURE — G8978 MOBILITY CURRENT STATUS: HCPCS

## 2018-04-21 PROCEDURE — 80048 BASIC METABOLIC PNL TOTAL CA: CPT | Performed by: SURGERY

## 2018-04-21 PROCEDURE — 25010000002 ENOXAPARIN PER 10 MG: Performed by: SURGERY

## 2018-04-21 PROCEDURE — 25010000002 ONDANSETRON PER 1 MG: Performed by: SURGERY

## 2018-04-21 PROCEDURE — 85027 COMPLETE CBC AUTOMATED: CPT | Performed by: SURGERY

## 2018-04-21 PROCEDURE — 97530 THERAPEUTIC ACTIVITIES: CPT

## 2018-04-21 PROCEDURE — 97162 PT EVAL MOD COMPLEX 30 MIN: CPT

## 2018-04-21 PROCEDURE — 94760 N-INVAS EAR/PLS OXIMETRY 1: CPT

## 2018-04-21 PROCEDURE — 94799 UNLISTED PULMONARY SVC/PX: CPT

## 2018-04-21 PROCEDURE — G8979 MOBILITY GOAL STATUS: HCPCS

## 2018-04-21 PROCEDURE — 97116 GAIT TRAINING THERAPY: CPT

## 2018-04-21 RX ORDER — HYDROCODONE BITARTRATE AND ACETAMINOPHEN 7.5; 325 MG/1; MG/1
1 TABLET ORAL EVERY 6 HOURS PRN
Status: DISCONTINUED | OUTPATIENT
Start: 2018-04-21 | End: 2018-04-23 | Stop reason: HOSPADM

## 2018-04-21 RX ADMIN — FAMOTIDINE 20 MG: 20 INJECTION, SOLUTION INTRAVENOUS at 21:36

## 2018-04-21 RX ADMIN — ENOXAPARIN SODIUM 40 MG: 40 INJECTION SUBCUTANEOUS at 09:12

## 2018-04-21 RX ADMIN — ONDANSETRON HYDROCHLORIDE 4 MG: 2 INJECTION INTRAMUSCULAR; INTRAVENOUS at 11:05

## 2018-04-21 RX ADMIN — SODIUM CHLORIDE 75 ML/HR: 9 INJECTION, SOLUTION INTRAVENOUS at 05:00

## 2018-04-21 RX ADMIN — HYDROCODONE BITARTRATE AND ACETAMINOPHEN 1 TABLET: 7.5; 325 TABLET ORAL at 18:05

## 2018-04-21 RX ADMIN — FAMOTIDINE 20 MG: 20 INJECTION, SOLUTION INTRAVENOUS at 09:12

## 2018-04-21 NOTE — PLAN OF CARE
Problem: Patient Care Overview  Goal: Plan of Care Review  Outcome: Ongoing (interventions implemented as appropriate)   04/21/18 9410   Coping/Psychosocial   Plan of Care Reviewed With patient   Plan of Care Review   Progress improving   OTHER   Outcome Summary pt had bowel movement this shift, no complaints of pain       Problem: Skin Injury Risk (Adult)  Goal: Skin Health and Integrity  Outcome: Ongoing (interventions implemented as appropriate)      Problem: Bowel Resection (Adult)  Goal: Signs and Symptoms of Listed Potential Problems Will be Absent, Minimized or Managed (Bowel Resection)  Outcome: Ongoing (interventions implemented as appropriate)    Goal: Anesthesia/Sedation Recovery  Outcome: Ongoing (interventions implemented as appropriate)

## 2018-04-21 NOTE — PLAN OF CARE
Problem: Patient Care Overview  Goal: Plan of Care Review  Outcome: Ongoing (interventions implemented as appropriate)      Problem: Surgery Nonspecified (Adult)  Goal: Signs and Symptoms of Listed Potential Problems Will be Absent, Minimized or Managed (Surgery Nonspecified)  Outcome: Ongoing (interventions implemented as appropriate)      Problem: Fall Risk (Adult)  Goal: Absence of Fall  Outcome: Ongoing (interventions implemented as appropriate)      Problem: Skin Injury Risk (Adult)  Goal: Skin Health and Integrity  Outcome: Ongoing (interventions implemented as appropriate)      Problem: Bowel Resection (Adult)  Goal: Signs and Symptoms of Listed Potential Problems Will be Absent, Minimized or Managed (Bowel Resection)  Outcome: Ongoing (interventions implemented as appropriate)

## 2018-04-21 NOTE — PLAN OF CARE
Problem: Patient Care Overview  Goal: Plan of Care Review  Outcome: Ongoing (interventions implemented as appropriate)   04/21/18 1039   Coping/Psychosocial   Plan of Care Reviewed With patient   OTHER   Outcome Summary PT evaluation completed today. Pt requires CGA for transfers and gait with  ft with 4L supplemental O2. Pt's HR increased to 131 during gait- RN aware. Pt will benefit from cont skilled PT to achieve highest level of funciton prior to d/c home with 24/7 assist and  PT

## 2018-04-21 NOTE — PROGRESS NOTES
"  Subjective:  Pod 3 rt colon and vhr.  Doing well.  Had 2 bms.  Hungry. Able to urinate     /79 (BP Location: Right arm, Patient Position: Lying)   Pulse 86   Temp 98.3 °F (36.8 °C) (Oral)   Resp 18   Ht 180.3 cm (71\")   Wt 116 kg (256 lb 1.6 oz)   SpO2 93%   BMI 35.72 kg/m²     Lab Results (last 24 hours)     Procedure Component Value Units Date/Time    Basic Metabolic Panel [[id number]]  (Abnormal) Collected:  04/21/18 0524    Specimen:  Blood Updated:  04/21/18 0646     Glucose 87 mg/dL      BUN 16 mg/dL      Creatinine 0.96 mg/dL      Sodium 135 (L) mmol/L      Potassium 4.3 mmol/L      Chloride 100 mmol/L      CO2 21.0 (L) mmol/L      Calcium 7.6 (L) mg/dL      eGFR Non African Amer 76 mL/min/1.73      BUN/Creatinine Ratio 16.7     Anion Gap 14.0 mmol/L     Narrative:       The MDRD GFR formula is only valid for adults with stable renal function between ages 18 and 70.    CBC (No Diff) [[id number]]  (Abnormal) Collected:  04/21/18 0524    Specimen:  Blood Updated:  04/21/18 0632     WBC 7.94 10*3/mm3      RBC 3.56 (L) 10*6/mm3      Hemoglobin 9.3 (L) g/dL      Hematocrit 28.1 (L) %      MCV 78.9 (L) fL      MCH 26.1 (L) pg      MCHC 33.1 g/dL      RDW 13.4 %      RDW-SD 38.5 fl      MPV 11.8 fL      Platelets 196 10*3/mm3           Current Medications:  Current Facility-Administered Medications   Medication Dose Route Frequency Provider Last Rate Last Dose   • enoxaparin (LOVENOX) syringe 40 mg  40 mg Subcutaneous Daily Frantz Shelley MD   40 mg at 04/20/18 0910   • famotidine (PEPCID) IVPB 20 mg  20 mg Intravenous Q12H Frantz Shelley MD 0 mL/hr at 04/20/18 0008 20 mg at 04/20/18 2011   • morphine injection 2 mg  2 mg Intravenous Q2H PRN Frantz Shelley MD        And   • naloxone (NARCAN) injection 0.4 mg  0.4 mg Intravenous Q5 Min PRN Frantz Shelley MD       • Morphine sulfate PCA 1 mg/mL 30 mL syringe   Intravenous Continuous Frantz Shelley MD       • " naloxone (NARCAN) injection 0.1 mg  0.1 mg Intravenous Q5 Min PRN Frantz Shelley MD       • ondansetron (ZOFRAN) tablet 4 mg  4 mg Oral Q6H PRN Frantz Shelley MD        Or   • ondansetron ODT (ZOFRAN-ODT) disintegrating tablet 4 mg  4 mg Oral Q6H PRN Frantz Shelley MD        Or   • ondansetron (ZOFRAN) injection 4 mg  4 mg Intravenous Q6H PRN Frantz Shelley MD   4 mg at 04/20/18 2344   • sodium chloride 0.9 % infusion  75 mL/hr Intravenous Continuous Frantz Shelley MD 75 mL/hr at 04/21/18 0500 75 mL/hr at 04/21/18 0500       Prior to admission medications:  Prescriptions Prior to Admission   Medication Sig Dispense Refill Last Dose   • aspirin 81 MG EC tablet Take 81 mg by mouth Every Night.      • Cyanocobalamin (B-12 COMPLIANCE INJECTION) 1000 MCG/ML kit Inject 1,000 mcg as directed Every 30 (Thirty) Days.   Past Month at Unknown time   • Ergocalciferol (VITAMIN D2 PO) Take 50,000 Units by mouth 2 (Two) Times a Week.      • SIMVASTATIN PO Take 40 mg by mouth Every Night.   Taking       Physical exam: alert  abd soft  Wound clean and intact  naseem old bld and serous    Assessment    doing well     Plan: Start diet and advance as tolerated  Hep lock iv fluids  Home meds  Oral pain meds  Ambulate, pulmonary toilet

## 2018-04-21 NOTE — THERAPY EVALUATION
Acute Care - Physical Therapy Initial Evaluation  [facility]     Patient Name: Sunny Ernandez  : 1940  MRN: [id number]  Today's Date: 2018   Onset of Illness/Injury or Date of Surgery: 18  Date of Referral to PT: 18  Referring Physician: Dr. Shelley      Admit Date: 2018    Visit Dx:     ICD-10-CM ICD-9-CM   1. Malignant neoplasm of ascending colon C18.2 153.6   2. Impaired functional mobility, balance, gait, and endurance Z74.09 V49.89     Patient Active Problem List   Diagnosis   • Malignant neoplasm of ascending colon     Past Medical History:   Diagnosis Date   • Colon cancer    • Hernia    • High cholesterol      Past Surgical History:   Procedure Laterality Date   • COLON RESECTION     • COLON RESECTION N/A 2018    Procedure: Open Right Colon Resection with repair of incisional hernias;  Surgeon: Frantz Shelley MD;  Location: [facility] OR;  Service: General   • COLON SURGERY     • COLONOSCOPY     • COLONOSCOPY N/A 2018    Procedure: COLONOSCOPY;  Surgeon: Austin Goins DO;  Location: [facility] ENDOSCOPY;  Service: Gastroenterology   • ENDOSCOPY N/A 2018    Procedure: ESOPHAGOGASTRODUODENOSCOPY;  Surgeon: Austin Goins DO;  Location: [facility] ENDOSCOPY;  Service: Gastroenterology   • FINGER SURGERY          PT ASSESSMENT (last 12 hours)      Physical Therapy Evaluation     Row Name 18 0953          PT Evaluation Time/Intention    Subjective Information complains of;pain  -MN     Document Type evaluation  -MN     Mode of Treatment individual therapy;physical therapy  -MN     Patient Effort good  -MN     Row Name 18 0953          General Information    Patient Profile Reviewed? yes  -MN     Onset of Illness/Injury or Date of Surgery 18  -MN     Referring Physician Dr. Shelley  -MN     Patient Observations alert;cooperative;agree to therapy  -MN     General Observations of Patient Right side lying +4L O2 +tele +XIOMARA drain   -MN     Prior Level of Function independent:;gait;transfer;ADL's;driving   grandson assists with cooking/cleaning  -MN     Equipment Currently Used at Home none   sleeps in adjustable bed, has quad cane  -MN     Pertinent History of Current Functional Problem Pt admitted for open colon resection 4/18 due to colon cancer  -MN     Existing Precautions/Restrictions fall;other (see comments);oxygen therapy device and L/min   gait belt placement  -MN     Risks Reviewed patient:;LOB;nausea/vomiting;dizziness;increased discomfort;change in vital signs;increased drainage;lines disloged  -MN     Benefits Reviewed patient:;improve function;increase independence;increase strength;increase balance;decrease pain;decrease risk of DVT;improve skin integrity;increase knowledge  -MN     Row Name 04/21/18 0953          Relationship/Environment    Lives With other (see comments)   grandson and his girlfriend  -MN     Row Name 04/21/18 0953          Resource/Environmental Concerns    Current Living Arrangements home/apartment/condo  -MN     Row Name 04/21/18 0953          Home Main Entrance    Number of Stairs, Main Entrance three  -MN     Stair Railings, Main Entrance railing on right side (ascending)  -MN     Row Name 04/21/18 0953          Cognitive Assessment/Interventions    Additional Documentation Cognitive Assessment/Intervention (Group)  -MN     Row Name 04/21/18 0953          Cognitive Assessment/Intervention- PT/OT    Orientation Status (Cognition) oriented x 4  -MN     Follows Commands (Cognition) WNL  -MN     Personal Safety Interventions gait belt;nonskid shoes/slippers when out of bed  -MN     Row Name 04/21/18 0953          Bed Mobility Assessment/Treatment    Bed Mobility Assessment/Treatment supine-sit;sit-supine  -MN     Supine-Sit [facility] (Bed Mobility) contact guard  -MN     Row Name 04/21/18 0953          Transfer Assessment/Treatment    Transfer Assessment/Treatment sit-stand transfer;stand-sit  transfer;toilet transfer  -MN     Sit-Stand [place] (Transfers) stand by assist  -MN     Stand-Sit [place] (Transfers) stand by assist  -MN     [place] Level (Toilet Transfer) stand by assist  -MN     Assistive Device (Toilet Transfer) --   grab bar  -MN     Row Name 04/21/18 0953          Sit-Stand Transfer    Assistive Device (Sit-Stand Transfers) walker, front-wheeled  -MN     Row Name 04/21/18 0953          Stand-Sit Transfer    Assistive Device (Stand-Sit Transfers) walker, front-wheeled  -MN     Row Name 04/21/18 0953          Toilet Transfer    Type (Toilet Transfer) sit-stand;stand-sit  -[facility] Name 04/21/18 0953          Gait/Stairs Assessment/Training    Gait/Stairs Assessment/Training gait/ambulation independence  -MN     [place] Level (Gait) contact guard  -MN     Assistive Device (Gait) walker, front-wheeled  -MN     Distance in Feet (Gait) 150 ft  -[facility] Name 04/21/18 0953          General ROM    GENERAL ROM COMMENTS BLEs ROM WNL  -[facility] Name 04/21/18 0953          General Assessment (Manual Muscle Testing)    General Manual Muscle Testing (MMT) Assessment lower extremity strength deficits identified  -MN     Row Name 04/21/18 0953          Lower Extremity (Manual Muscle Testing)    Comment, MMT: Lower Extremity BLEs: hip flex 4/5, knee ext 5/5, RLE: knee flex, DF, PF 4+/5, LLE: knee flex, Df, PF 5/5  -[facility] Name 04/21/18 0953          Sensory Assessment/Intervention    Sensory General Assessment light touch sensation deficits identified  -MN     Row Name 04/21/18 0953          Vision Assessment/Intervention    Visual Impairment/Limitations WNL  -MN     Row Name 04/21/18 0953          Light Touch Sensation Assessment    Left Lower Extremity: Light Touch Sensation Assessment intact  -MN     Right Lower Extremity: Light Touch Sensation Assessment mild impairment, 75% or more correct responses  -MN     Row Name 04/21/18 0953          Pain Assessment    Additional  "Documentation Pain Scale: Numbers Pre/Post-Treatment (Group)  -MN     Row Name 04/21/18 0953          Pain Scale: Numbers Pre/Post-Treatment    Pain Scale: Numbers, Pretreatment 4/10  -MN     Pain Scale: Numbers, Post-Treatment 4/10  -MN     Pain Location abdomen  -MN     Pain Intervention(s) Medication (See MAR);Repositioned  -MN     Row Name             Wound 04/18/18 1157 Other (See comments) midline abdomen incision    Wound - Properties Group Date first assessed: 04/18/18  -CF Time first assessed: 1157  -CF Present On Admission : no  -CF Side: Other (See comments)  -CF, midline abdominal incision  Orientation: midline  -CF Location: abdomen  -CF Type: incision  -CF Additional Comments: closed incisions. dressings applied (4x4, tape, drain sponge)  -CF    Row Name 04/21/18 0953          Plan of Care Review    Plan of Care Reviewed With patient  -MN     Row Name 04/21/18 0953          Physical Therapy Clinical Impression    Date of Referral to PT 04/20/18  -MN     PT Diagnosis (PT Clinical Impression) impaired mobility  -MN     Patient/Family Goals Statement (PT Clinical Impression) \"Go home\"  -MN     Criteria for Skilled Interventions Met (PT Clinical Impression) yes;treatment indicated  -MN     Pathology/Pathophysiology Noted (Describe Specifically for Each System) musculoskeletal;cardiovascular;pulmonary  -MN     Impairments Found (describe specific impairments) aerobic capacity/endurance;gait, locomotion, and balance;ventilation and respiration/gas exchange  -MN     Rehab Potential (PT Clinical Summary) good, to achieve stated therapy goals  -MN     Predicted Duration of Therapy (PT) unitl d/c or all goals met  -MN     Care Plan Review (PT) evaluation/treatment results reviewed;patient/other agree to care plan  -MN     Row Name 04/21/18 0953          Vital Signs    Pre Systolic BP Rehab 123  -MN     Pre Treatment Diastolic BP 68  -MN     Post Systolic BP Rehab 148  -MN     Post Treatment Diastolic BP 93  -MN  "    Pretreatment Heart Rate (beats/min) 89  -MN     Intratreatment Heart Rate (beats/min) 131  -MN     Posttreatment Heart Rate (beats/min) 97  -MN     Pre SpO2 (%) 93  -MN     O2 Delivery Pre Treatment supplemental O2  -MN     Post SpO2 (%) 93  -MN     O2 Delivery Post Treatment supplemental O2  -MN     Pre Patient Position Supine  -MN     Intra Patient Position Standing  -MN     Post Patient Position Supine  -MN     Row Name 04/21/18 0953          Physical Therapy Goals    Bed Mobility Goal Selection (PT) bed mobility, PT goal 1  -MN     Transfer Goal Selection (PT) transfer, PT goal 1  -MN     Gait Training Goal Selection (PT) gait training, PT goal 1  -MN     Stairs Goal Selection (PT) stairs, PT goal 1  -MN     Additional Documentation Stairs Goal Selection (PT) (Row)  -MN     Row Name 04/21/18 0953          Bed Mobility Goal 1 (PT)    Activity/Assistive Device (Bed Mobility Goal 1, PT) bed mobility activities, all  -MN     [place] Level/Cues Needed (Bed Mobility Goal 1, PT) independent  -MN     Time Frame (Bed Mobility Goal 1, PT) 2 weeks  -MN     Progress/Outcomes (Bed Mobility Goal 1, PT) goal not met  -MN     Row Name 04/21/18 0953          Transfer Goal 1 (PT)    Activity/Assistive Device (Transfer Goal 1, PT) sit-to-stand/stand-to-sit;bed-to-chair/chair-to-bed;toilet  -MN     [place] Level/Cues Needed (Transfer Goal 1, PT) independent  -MN     Time Frame (Transfer Goal 1, PT) 2 weeks  -MN     Progress/Outcome (Transfer Goal 1, PT) goal not met  -MN     Row Name 04/21/18 0953          Gait Training Goal 1 (PT)    Activity/Assistive Device (Gait Training Goal 1, PT) gait (walking locomotion)  -MN     [place] Level (Gait Training Goal 1, PT) conditional independence  -MN     Distance (Gait Goal 1, PT) 300 ft  -MN     Time Frame (Gait Training Goal 1, PT) 2 weeks  -MN     Progress/Outcome (Gait Training Goal 1, PT) goal not met  -MN     Row Name 04/21/18 0953          Stairs Goal 1 (PT)     Activity/Assistive Device (Stairs Goal 1, PT) ascending stairs;descending stairs;using handrail  -MN     [place] Level/Cues Needed (Stairs Goal 1, PT) conditional independence  -MN     Number of Stairs (Stairs Goal 1, PT) 3  -MN     Time Frame (Stairs Goal 1, PT) 2 weeks  -MN     Progress/Outcome (Stairs Goal 1, PT) goal not met  -MN     Row Name 04/21/18 0953          Positioning and Restraints    Pre-Treatment Position in bed  -MN     Post Treatment Position bed  -MN     In Bed fowlers;call light within reach;encouraged to call for assist  -MN     Row Name 04/21/18 0953          Living Environment    Home Accessibility stairs to enter home   [place] shower  -MN       User Key  (r) = Recorded By, (t) = Taken By, (c) = Cosigned By    Initials Name Provider Type    LUBA Vincent, PT Physical Therapist    SARINA Bernabe, RN Registered Nurse          Physical Therapy Education     Title: PT OT SLP Therapies (Active)     Topic: Physical Therapy (Active)     Point: Mobility training (Done)    Learning Progress Summary     Learner Status Readiness Method Response Comment Documented by    Patient Done Acceptance E VU Role of PT in hospital, No OOB without assist MN 04/21/18 1016          Point: Precautions (Done)    Learning Progress Summary     Learner Status Readiness Method Response Comment Documented by    Patient Done Acceptance E VU Role of PT in hospital, No OOB without assist MN 04/21/18 1016                      User Key     Initials Effective Dates Name Provider Type Discipline    MN 04/03/18 -  My Vincent, PT Physical Therapist PT                PT Recommendation and Plan  Anticipated Discharge Disposition (PT): home with home health care (and 24/7 assist)  Planned Therapy Interventions (PT Eval): balance training, bed mobility training, gait training, home exercise program, patient/family education, stair training, strengthening, stretching, transfer training  Therapy Frequency (PT Clinical  Impression): other (see comments) (5-14x/week)  Outcome Summary/Treatment Plan (PT)  Anticipated Equipment Needs at Discharge (PT): four wheeled walker  Anticipated Discharge Disposition (PT): home with home health care (and 24/7 assist)  Plan of Care Reviewed With: patient  Outcome Summary: PT evaluation completed today. Pt requires CGA for transfers and gait with  ft with 4L supplemental O2. Pt's HR increased to 131 during gait- RN aware. Pt will benefit from cont skilled PT to achieve highest level of funciton prior to d/c home with 24/7 assist and HH PT          Outcome Measures     Row Name 04/21/18 0953             How much help from another person do you currently need...    Turning from your back to your side while in flat bed without using bedrails? 3  -MN      Moving from lying on back to sitting on the side of a flat bed without bedrails? 3  -MN      Moving to and from a bed to a chair (including a wheelchair)? 3  -MN      Standing up from a chair using your arms (e.g., wheelchair, bedside chair)? 3  -MN      Climbing 3-5 steps with a railing? 3  -MN      To walk in hospital room? 3  -MN      AM-PAC 6 Clicks Score 18  -MN         Functional Assessment    Outcome Measure Options AM-PAC 6 Clicks Basic Mobility (PT)  -MN        User Key  (r) = Recorded By, (t) = Taken By, (c) = Cosigned By    Initials Name Provider Type    LUBA Vincent PT Physical Therapist           Time Calculation:         PT Charges     Row Name 04/21/18 1043             Time Calculation    Start Time 0953  -MN      Stop Time 1043  -MN      Time Calculation (min) 50 min  -MN      PT Received On 04/21/18  -MN      PT Goal Re-Cert Due Date 05/04/18  -MN         Time Calculation- PT    Total Timed Code Minutes- PT 23 minute(s)  -MN        User Key  (r) = Recorded By, (t) = Taken By, (c) = Cosigned By    Initials Name Provider Type    LUBA Vincent PT Physical Therapist          Therapy Charges for Today     Code  Description Service Date Service Provider Modifiers Qty    10363225084 HC PT MOBILITY CURRENT 4/21/2018 My Vincent, PT GP, CK 1    93722162940 HC PT MOBILITY PROJECTED 4/21/2018 My Vincent, PT GP, CI 1    [id number] HC PT EVAL MOD COMPLEXITY 1 4/21/2018 My Vincent, PT GP 1    08186074115 HC GAIT TRAINING EA 15 MIN 4/21/2018 My Vincent, PT GP 1    23749343803 HC PT THERAPEUTIC ACT EA 15 MIN 4/21/2018 My Vincent, PT GP 1          PT G-Codes  PT Professional Judgement Used?: Yes  Outcome Measure Options: AM-PAC 6 Clicks Basic Mobility (PT)  Score: 18  Functional Limitation: Mobility: Walking and moving around  Mobility: Walking and Moving Around Current Status (): At least 40 percent but less than 60 percent impaired, limited or restricted  Mobility: Walking and Moving Around Goal Status (): At least 1 percent but less than 20 percent impaired, limited or restricted      My Vincent PT  4/21/2018

## 2018-04-22 PROCEDURE — 94760 N-INVAS EAR/PLS OXIMETRY 1: CPT

## 2018-04-22 PROCEDURE — 97116 GAIT TRAINING THERAPY: CPT

## 2018-04-22 PROCEDURE — 25010000002 ENOXAPARIN PER 10 MG: Performed by: SURGERY

## 2018-04-22 PROCEDURE — 97530 THERAPEUTIC ACTIVITIES: CPT

## 2018-04-22 PROCEDURE — 94799 UNLISTED PULMONARY SVC/PX: CPT

## 2018-04-22 PROCEDURE — 97110 THERAPEUTIC EXERCISES: CPT

## 2018-04-22 RX ADMIN — FAMOTIDINE 20 MG: 20 INJECTION, SOLUTION INTRAVENOUS at 08:25

## 2018-04-22 RX ADMIN — HYDROCODONE BITARTRATE AND ACETAMINOPHEN 1 TABLET: 7.5; 325 TABLET ORAL at 06:47

## 2018-04-22 RX ADMIN — ONDANSETRON 4 MG: 4 TABLET, FILM COATED ORAL at 06:43

## 2018-04-22 RX ADMIN — ENOXAPARIN SODIUM 40 MG: 40 INJECTION SUBCUTANEOUS at 08:25

## 2018-04-22 RX ADMIN — FAMOTIDINE 20 MG: 20 INJECTION, SOLUTION INTRAVENOUS at 20:06

## 2018-04-22 NOTE — THERAPY TREATMENT NOTE
Acute Care - Physical Therapy Treatment Note  [facility]     Patient Name: Sunny Ernandez  : 1940  MRN: [id number]  Today's Date: 2018  Onset of Illness/Injury or Date of Surgery: 18  Date of Referral to PT: 18  Referring Physician: Dr. Shelley    Admit Date: 2018    Visit Dx:    ICD-10-CM ICD-9-CM   1. Malignant neoplasm of ascending colon C18.2 153.6   2. Impaired functional mobility, balance, gait, and endurance Z74.09 V49.89     Patient Active Problem List   Diagnosis   • Malignant neoplasm of ascending colon       Therapy Treatment          Rehabilitation Treatment Summary     Row Name 18 0943             Treatment Time/Intention    Discipline physical therapy assistant  -DUNCAN      Document Type therapy note (daily note)  -DUNCAN      Subjective Information complains of;pain  -DUNCAN      Mode of Treatment physical therapy;individual therapy  -DUNCAN      Patient Effort good  -DUNCAN      Existing Precautions/Restrictions fall;oxygen therapy device and L/min;other (see comments)   gait belt placement  -DUNCAN      Recorded by [DUNCAN] John Barnes PTA 18 1259 18 1259      Row Name 18 0943             Vital Signs    Pre Systolic BP Rehab 132  -DUNCAN      Pre Treatment Diastolic BP 72  -DUNCAN      Post Systolic BP Rehab 104  -DUNCAN      Post Treatment Diastolic BP 74  -DUNCAN      Pretreatment Heart Rate (beats/min) 94  -DUNCAN      Intratreatment Heart Rate (beats/min) 98  -DUNCAN      Posttreatment Heart Rate (beats/min) 89  -DUNCAN      Pre SpO2 (%) 96  -DUNCAN      O2 Delivery Pre Treatment supplemental O2  -DUNCAN      Intra SpO2 (%) 88   quicky improved to 98%  -DUNCAN      O2 Delivery Intra Treatment supplemental O2  -DUNCAN      Post SpO2 (%) 98  -DUNCAN      O2 Delivery Post Treatment supplemental O2  -DUNCAN      Pre Patient Position Supine  -DUNCAN      Post Patient Position Sitting  -DUNCAN      Recorded by [DUNCAN] John Barnes PTA 18 1259 18 1259      Row Name 18 0943             Cognitive  Assessment/Intervention- PT/OT    Orientation Status (Cognition) oriented x 4  -DUNCAN      Follows Commands (Cognition) WFL  -DUNCAN      Personal Safety Interventions gait belt;muscle strengthening facilitated;nonskid shoes/slippers when out of bed;supervised activity  -DUNCAN      Recorded by [DUNCAN] John Barnes, [facility] 04/22/18 1259 04/22/18 1259      Row Name 04/22/18 0943             Bed Mobility Assessment/Treatment    Bed Mobility Assessment/Treatment supine-sit  -DUNCAN      Supine-Sit [place] (Bed Mobility) supervision  -DUNCAN      Recorded by [DUNCAN] John Barnes, [facility] 04/22/18 1259 04/22/18 1259      Row Name 04/22/18 0943             Transfer Assessment/Treatment    Transfer Assessment/Treatment bed-chair transfer;sit-stand transfer;stand-sit transfer;toilet transfer  -DUNCAN      Sit-Stand [place] (Transfers) supervision  -DUNCAN      Stand-Sit [place] (Transfers) stand by assist  -DUNCAN      [place] Level (Toilet Transfer) supervision  -DUNCAN      Assistive Device (Toilet Transfer) --   BR t/f  -DUNCAN      Recorded by [DUNCAN] John Barnes, [facility] 04/22/18 1259 04/22/18 1259      Row Name 04/22/18 0943             Sit-Stand Transfer    Assistive Device (Sit-Stand Transfers) walker, front-wheeled  -DUNCAN      Recorded by [DUNCAN] John Barnes, [facility] 04/22/18 1259 04/22/18 1259      Row Name 04/22/18 0943             Stand-Sit Transfer    Assistive Device (Stand-Sit Transfers) walker, front-wheeled  -DUNCAN      Recorded by [DUNCAN] John Barnes, [facility] 04/22/18 1259 04/22/18 1259      Row Name 04/22/18 0943             Toilet Transfer    Type (Toilet Transfer) sit-stand;stand-sit  -DUNCAN      Recorded by [DUNCAN] John Barnes, PTA 04/22/18 1259 04/22/18 1259      Row Name 04/22/18 0943             Gait/Stairs Assessment/Training    Gait/Stairs Assessment/Training gait/ambulation independence;gait/ambulation assistive device;distance ambulated;gait pattern;gait deviations  -DUNCAN      [place] Level (Gait) contact guard  -DUNCAN      Assistive  Device (Gait) walker, front-wheeled  -DUNCAN      Distance in Feet (Gait) 328  -DUNCAN      Deviations/Abnormal Patterns (Gait) berry decreased   SOA  -DUNCAN      Recorded by [DUNCAN] John Barnes, PTA 04/22/18 1259 04/22/18 1259      Row Name 04/22/18 0943             Therapeutic Exercise    Therapeutic Exercise seated, lower extremities  -DUNCAN      Recorded by [DUNCAN] John Barnes, PTA 04/22/18 1259 04/22/18 1259      Row Name 04/22/18 0943             Lower Extremity Seated Therapeutic Exercise    Performed, Seated Lower Extremity (Therapeutic Exercise) ankle dorsiflexion/plantarflexion;LAQ (long arc quad), knee extension;hip abduction/adduction;hip flexion/extension  -DUNCAN      Exercise Type, Seated Lower Extremity (Therapeutic Exercise) AROM (active range of motion)  -DUNCAN      Sets/Reps Detail, Seated Lower Extremity (Therapeutic Exercise) 20x1  -DUNCAN      Recorded by [DUNCAN] John Barnes, PTA 04/22/18 1259 04/22/18 1259      Row Name 04/22/18 0943             Positioning and Restraints    Pre-Treatment Position in bed  -DUNCAN      Post Treatment Position chair  -DUNCAN      In Chair reclined;call light within reach;encouraged to call for assist;exit alarm on  -DUNCAN      Recorded by [DUNCAN] John Barnes, PTA 04/22/18 1259 04/22/18 1259      Row Name 04/22/18 0943             Pain Assessment    Additional Documentation Pain Scale: Numbers Pre/Post-Treatment (Group)  -DUNCAN      Recorded by [DUNCAN] John Barnes, PTA 04/22/18 1259 04/22/18 1259      Row Name 04/22/18 0943             Pain Scale: Numbers Pre/Post-Treatment    Pain Scale: Numbers, Pretreatment 0/10 - no pain  -DUNCAN      Pain Scale: Numbers, Post-Treatment 0/10 - no pain  -DUNCAN      Recorded by [DUNCAN] John Barnes, PTA 04/22/18 1259 04/22/18 1259      Row Name                Wound 04/18/18 1157 Other (See comments) midline abdomen incision    Wound - Properties Group Date first assessed: 04/18/18 [CF] Time first assessed: 1157 [CF] Present On Admission : no [CF] Side: Other (See  comments) [CF], midline abdominal incision  Orientation: midline [CF] Location: abdomen [CF] Type: incision [CF] Additional Comments: closed incisions. dressings applied (4x4, tape, drain sponge) [CF] Recorded by:  [CF] Miriam Bernabe RN 04/18/18 1158 04/18/18 1158    Row Name 04/22/18 0943             Outcome Summary/Treatment Plan (PT)    Daily Summary of Progress (PT) progress toward functional goals as expected  -DUNCAN      Plan for Continued Treatment (PT) continue  -DUNCAN      Anticipated Equipment Needs at Discharge (PT) front wheeled walker  -DUNCAN      Anticipated Discharge Disposition (PT) home with home health care  -DUNCAN      Recorded by [DUNCAN] John Barnes, PTA 04/22/18 1259 04/22/18 1259        User Key  (r) = Recorded By, (t) = Taken By, (c) = Cosigned By    Initials Name Effective Dates Discipline    DUNCAN John Barnes PTA 03/07/18 -  PT    CF Miriam Bernabe RN 10/17/16 -  Nurse          Wound 04/18/18 1157 Other (See comments) midline abdomen incision (Active)   Dressing Appearance dry;intact 4/22/2018  9:42 AM   Closure G[place] 4/21/2018  8:18 PM   Base dressing in place, unable to visualize 4/21/2018  8:18 PM   Drainage Amount scant 4/22/2018  9:42 AM               PT Rehab Goals     Row Name 04/22/18 0943             Bed Mobility Goal 1 (PT)    Activity/Assistive Device (Bed Mobility Goal 1, PT) bed mobility activities, all  -DUNCAN      [place] Level/Cues Needed (Bed Mobility Goal 1, PT) independent  -DUNCAN      Time Frame (Bed Mobility Goal 1, PT) 2 weeks  -DUNCAN      Progress/Outcomes (Bed Mobility Goal 1, PT) goal not met  -DUNCAN         Transfer Goal 1 (PT)    Activity/Assistive Device (Transfer Goal 1, PT) sit-to-stand/stand-to-sit;bed-to-chair/chair-to-bed;toilet  -DUNCAN      [place] Level/Cues Needed (Transfer Goal 1, PT) independent  -DUNCAN      Time Frame (Transfer Goal 1, PT) 2 weeks  -DUNCAN      Progress/Outcome (Transfer Goal 1, PT) goal not met  -DUNCAN         Gait Training Goal 1 (PT)    Activity/Assistive  Device (Gait Training Goal 1, PT) gait (walking locomotion)  -DUNCAN      [place] Level (Gait Training Goal 1, PT) conditional independence  -DUNCAN      Distance (Gait Goal 1, PT) 300 ft  -DUNCAN      Time Frame (Gait Training Goal 1, PT) 2 weeks  -DUNCAN      Progress/Outcome (Gait Training Goal 1, PT) goal not met  -DUNCAN         Stairs Goal 1 (PT)    Activity/Assistive Device (Stairs Goal 1, PT) ascending stairs;descending stairs;using handrail  -DUNCAN      [place] Level/Cues Needed (Stairs Goal 1, PT) conditional independence  -DUNCAN      Number of Stairs (Stairs Goal 1, PT) 3  -DUNCAN      Time Frame (Stairs Goal 1, PT) 2 weeks  -DUNCAN      Progress/Outcome (Stairs Goal 1, PT) goal not met  -DUNCAN        User Key  (r) = Recorded By, (t) = Taken By, (c) = Cosigned By    Initials Name Provider Type Discipline     John Barnes PTA Physical Therapy Assistant PT          Physical Therapy Education     Title: PT OT SLP Therapies (Active)     Topic: Physical Therapy (Active)     Point: Mobility training (Active)    Learning Progress Summary     Learner Status Readiness Method Response Comment Documented by    Patient Active Acceptance E NR  DUNCAN 04/22/18 1302     Done Acceptance E VU Role of PT in hospital, No OOB without assist MN 04/21/18 1016          Point: Precautions (Done)    Learning Progress Summary     Learner Status Readiness Method Response Comment Documented by    Patient Done Acceptance E VU Role of PT in hospital, No OOB without assist MN 04/21/18 1016                      User Key     Initials Effective Dates Name Provider Type Discipline    MN 04/03/18 -  My Vincent, PT Physical Therapist PT     03/07/18 -  John Barnes PTA Physical Therapy Assistant PT                    PT Recommendation and Plan  Anticipated Discharge Disposition (PT): home with home health care  Outcome Summary/Treatment Plan (PT)  Daily Summary of Progress (PT): progress toward functional goals as expected  Plan for Continued Treatment  (PT): continue  Anticipated Equipment Needs at Discharge (PT): front wheeled walker  Anticipated Discharge Disposition (PT): home with home health care  Plan of Care Reviewed With: patient  Progress: improving  Outcome Summary: pt responded well to therapy w/ increased gait to 328 ft SBA w/ RW. pt required SBA for sit-stands and t/fs. pt participated in LE ther ex. no new goals met at this time. pt would continue to benefit from PT services.           Outcome Measures     Row Name 04/22/18 0943 04/21/18 0953          How much help from another person do you currently need...    Turning from your back to your side while in flat bed without using bedrails? 3  -DUNCAN 3  -MN     Moving from lying on back to sitting on the side of a flat bed without bedrails? 3  -DUNCAN 3  -MN     Moving to and from a bed to a chair (including a wheelchair)? 3  -DUNCAN 3  -MN     Standing up from a chair using your arms (e.g., wheelchair, bedside chair)? 3  -DUNCAN 3  -MN     Climbing 3-5 steps with a railing? 3  -DUNCAN 3  -MN     To walk in hospital room? 3  -DUNCAN 3  -MN     AM-PAC 6 Clicks Score 18  -DUNCAN 18  -MN        Functional Assessment    Outcome Measure Options AM-PAC 6 Clicks Basic Mobility (PT)  -DUNCAN AM-PAC 6 Clicks Basic Mobility (PT)  -MN       User Key  (r) = Recorded By, (t) = Taken By, (c) = Cosigned By    Initials Name Provider Type    LUBA Vincent, PT Physical Therapist    DUNCAN Barnes PTA Physical Therapy Assistant           Time Calculation:         PT Charges     Row Name 04/22/18 1304             Time Calculation    Start Time 0943  -      Stop Time 1022  -      Time Calculation (min) 39 min  -DUNCAN         Time Calculation- PT    Total Timed Code Minutes- PT 39 minute(s)  -DUNCAN        User Key  (r) = Recorded By, (t) = Taken By, (c) = Cosigned By    Initials Name Provider Type    DUNCAN Barnes PTA Physical Therapy Assistant          Therapy Charges for Today     Code Description Service Date Service Provider Modifiers  Qty    21627019165 HC GAIT TRAINING EA 15 MIN 4/22/2018 John Barnes PTA GP 1    25922951559 HC PT THER PROC EA 15 MIN 4/22/2018 John Barnes, PTA GP 1    96148611901 HC PT THERAPEUTIC ACT EA 15 MIN 4/22/2018 John Barnes PTA GP 1          PT G-Codes  PT Professional Judgement Used?: Yes  Outcome Measure Options: AM-PAC 6 Clicks Basic Mobility (PT)  Score: 18  Functional Limitation: Mobility: Walking and moving around  Mobility: Walking and Moving Around Current Status (): At least 40 percent but less than 60 percent impaired, limited or restricted  Mobility: Walking and Moving Around Goal Status (): At least 1 percent but less than 20 percent impaired, limited or restricted    John Barnes PTA  4/22/2018

## 2018-04-22 NOTE — PLAN OF CARE
Problem: Patient Care Overview  Goal: Plan of Care Review  Outcome: Ongoing (interventions implemented as appropriate)   04/22/18 0850   Coping/Psychosocial   Plan of Care Reviewed With patient   Plan of Care Review   Progress improving   OTHER   Outcome Summary pt responded well to therapy w/ increased gait to 328 ft SBA w/ RW. pt required SBA for sit-stands and t/fs. pt participated in LE ther ex. no new goals met at this time. pt would continue to benefit from PT services.

## 2018-04-22 NOTE — PLAN OF CARE
Problem: Patient Care Overview  Goal: Plan of Care Review  Outcome: Ongoing (interventions implemented as appropriate)   04/22/18 0431   Coping/Psychosocial   Plan of Care Reviewed With patient   Plan of Care Review   Progress improving   OTHER   Outcome Summary advanced diet to GI soft for breakfast, pt resting well, VSS       Problem: Fall Risk (Adult)  Goal: Absence of Fall  Outcome: Ongoing (interventions implemented as appropriate)      Problem: Skin Injury Risk (Adult)  Goal: Skin Health and Integrity  Outcome: Ongoing (interventions implemented as appropriate)      Problem: Bowel Resection (Adult)  Goal: Signs and Symptoms of Listed Potential Problems Will be Absent, Minimized or Managed (Bowel Resection)  Outcome: Ongoing (interventions implemented as appropriate)

## 2018-04-22 NOTE — PROGRESS NOTES
"  Subjective:   Tolerating diet and had bm.     /88 (BP Location: Left arm, Patient Position: Lying)   Pulse 91   Temp 97.9 °F (36.6 °C) (Axillary)   Resp 18   Ht 180.3 cm (71\")   Wt 116 kg (256 lb 1.6 oz)   SpO2 94%   BMI 35.72 kg/m²     Lab Results (last 24 hours)     ** No results found for the last 24 hours. **          Current Medications:  Current Facility-Administered Medications   Medication Dose Route Frequency Provider Last Rate Last Dose   • enoxaparin (LOVENOX) syringe 40 mg  40 mg Subcutaneous Daily Frantz Shelley MD   40 mg at 04/22/18 0825   • famotidine (PEPCID) IVPB 20 mg  20 mg Intravenous Q12H Frantz Shelley MD 0 mL/hr at 04/20/18 0008 20 mg at 04/22/18 0825   • HYDROcodone-acetaminophen (NORCO) 7.5-325 MG per tablet 1 tablet  1 tablet Oral Q6H PRN Jesus Napoles MD   1 tablet at 04/22/18 0647   • naloxone (NARCAN) injection 0.1 mg  0.1 mg Intravenous Q5 Min PRN Frantz Shelley MD       • naloxone (NARCAN) injection 0.4 mg  0.4 mg Intravenous Q5 Min PRN Frantz Shelley MD       • ondansetron (ZOFRAN) tablet 4 mg  4 mg Oral Q6H PRN Frantz Shelley MD   4 mg at 04/22/18 0643    Or   • ondansetron ODT (ZOFRAN-ODT) disintegrating tablet 4 mg  4 mg Oral Q6H PRN Frantz Shelley MD        Or   • ondansetron (ZOFRAN) injection 4 mg  4 mg Intravenous Q6H PRN Frantz Shelley MD   4 mg at 04/21/18 1105       Prior to admission medications:  Prescriptions Prior to Admission   Medication Sig Dispense Refill Last Dose   • aspirin 81 MG EC tablet Take 81 mg by mouth Every Night.      • Cyanocobalamin (B-12 COMPLIANCE INJECTION) 1000 MCG/ML kit Inject 1,000 mcg as directed Every 30 (Thirty) Days.   Past Month at Unknown time   • Ergocalciferol (VITAMIN D2 PO) Take 50,000 Units by mouth 2 (Two) Times a Week.      • SIMVASTATIN PO Take 40 mg by mouth Every Night.   Taking       Physical exam: alert  abd soft  Wound clean  Leonidas " serous    Assessment : Doing well    Plan: Shower  Likely home tomorrow

## 2018-04-23 VITALS
HEART RATE: 79 BPM | HEIGHT: 71 IN | SYSTOLIC BLOOD PRESSURE: 129 MMHG | WEIGHT: 256.1 LBS | OXYGEN SATURATION: 97 % | TEMPERATURE: 97.9 F | DIASTOLIC BLOOD PRESSURE: 86 MMHG | RESPIRATION RATE: 16 BRPM | BODY MASS INDEX: 35.85 KG/M2

## 2018-04-23 PROCEDURE — 25010000002 ENOXAPARIN PER 10 MG: Performed by: SURGERY

## 2018-04-23 PROCEDURE — 99024 POSTOP FOLLOW-UP VISIT: CPT | Performed by: SURGERY

## 2018-04-23 RX ORDER — HYDROCODONE BITARTRATE AND ACETAMINOPHEN 7.5; 325 MG/1; MG/1
1 TABLET ORAL EVERY 6 HOURS PRN
Qty: 40 TABLET | Refills: 0 | Status: SHIPPED | OUTPATIENT
Start: 2018-04-23 | End: 2022-04-22

## 2018-04-23 RX ADMIN — HYDROCODONE BITARTRATE AND ACETAMINOPHEN 1 TABLET: 7.5; 325 TABLET ORAL at 06:49

## 2018-04-23 RX ADMIN — ENOXAPARIN SODIUM 40 MG: 40 INJECTION SUBCUTANEOUS at 07:43

## 2018-04-23 NOTE — PLAN OF CARE
Problem: Fall Risk (Adult)  Goal: Absence of Fall  Outcome: Ongoing (interventions implemented as appropriate)      Problem: Skin Injury Risk (Adult)  Goal: Skin Health and Integrity  Outcome: Ongoing (interventions implemented as appropriate)

## 2018-04-23 NOTE — SIGNIFICANT NOTE
"   04/23/18 0910   Rehab Treatment   Discipline physical therapy assistant   Reason Treatment Not Performed patient/family declined treatment  (Pt stated, \"I am the lulú one - I get to go home.\" PTA offered PT services prior to d/c but pt declined, stating, \"I don't feel like I need to do anything else.\")   Recommendation   PT - Next Appointment 04/24/18     "

## 2018-04-23 NOTE — THERAPY DISCHARGE NOTE
Acute Care - Physical Therapy Discharge Summary  [facility]       Patient Name: Sunny Ernandez  : 1940  MRN: [id number]    Today's Date: 2018  Onset of Illness/Injury or Date of Surgery: 18    Date of Referral to PT: 18  Referring Physician: Dr. Shelley      Admit Date: 2018      PT Recommendation and Plan    Visit Dx:    ICD-10-CM ICD-9-CM   1. Malignant neoplasm of ascending colon C18.2 153.6   2. Impaired functional mobility, balance, gait, and endurance Z74.09 V49.89             Outcome Measures     Row Name 18 0943 18 0953          How much help from another person do you currently need...    Turning from your back to your side while in flat bed without using bedrails? 3  -DUNCAN 3  -MN     Moving from lying on back to sitting on the side of a flat bed without bedrails? 3  -DUNCAN 3  -MN     Moving to and from a bed to a chair (including a wheelchair)? 3  -DUNCAN 3  -MN     Standing up from a chair using your arms (e.g., wheelchair, bedside chair)? 3  -DUNCAN 3  -MN     Climbing 3-5 steps with a railing? 3  -DUNCAN 3  -MN     To walk in hospital room? 3  -DUNCAN 3  -MN     AM-PAC 6 Clicks Score 18  -DUNCAN 18  -MN        Functional Assessment    Outcome Measure Options AM-PAC 6 Clicks Basic Mobility (PT)  -DUNCAN AM-[facility] 6 Clicks Basic Mobility (PT)  -MN       User Key  (r) = Recorded By, (t) = Taken By, (c) = Cosigned By    Initials Name Provider Type    LUBA Vincent, PT Physical Therapist    DUNCAN Barnes PTA Physical Therapy Assistant                PT Charges     Row Name 18 0910             Time Calculation    PT - Next Appointment 18  -AM        User Key  (r) = Recorded By, (t) = Taken By, (c) = Cosigned By    Initials Name Provider Type    AM Nitin Vieira PTA Physical Therapy Assistant                  PT Rehab Goals     Row Name 18 0910             Bed Mobility Goal 1 (PT)    Activity/Assistive Device (Bed Mobility Goal 1, PT) bed mobility  activities, all  -AM      [place] Level/Cues Needed (Bed Mobility Goal 1, PT) independent  -AM      Time Frame (Bed Mobility Goal 1, PT) 2 weeks  -AM      Progress/Outcomes (Bed Mobility Goal 1, PT) goal not met;discharged from facility  -AM         Transfer Goal 1 (PT)    Activity/Assistive Device (Transfer Goal 1, PT) sit-to-stand/stand-to-sit;bed-to-chair/chair-to-bed;toilet  -AM      [place] Level/Cues Needed (Transfer Goal 1, PT) independent  -AM      Time Frame (Transfer Goal 1, PT) 2 weeks  -AM      Progress/Outcome (Transfer Goal 1, PT) goal not met;discharged from facility  -AM         Gait Training Goal 1 (PT)    Activity/Assistive Device (Gait Training Goal 1, PT) gait (walking locomotion)  -AM      [place] Level (Gait Training Goal 1, PT) conditional independence  -AM      Distance (Gait Goal 1, PT) 300 ft  -AM      Time Frame (Gait Training Goal 1, PT) 2 weeks  -AM      Progress/Outcome (Gait Training Goal 1, PT) goal not met;discharged from facility  -AM         Stairs Goal 1 (PT)    Activity/Assistive Device (Stairs Goal 1, PT) ascending stairs;descending stairs;using handrail  -AM      [place] Level/Cues Needed (Stairs Goal 1, PT) conditional independence  -AM      Number of Stairs (Stairs Goal 1, PT) 3  -AM      Time Frame (Stairs Goal 1, PT) 2 weeks  -AM      Progress/Outcome (Stairs Goal 1, PT) goal not met;discharged from facility  -AM        User Key  (r) = Recorded By, (t) = Taken By, (c) = Cosigned By    Initials Name Provider Type Discipline    AM Nitin Vieira PTA Physical Therapy Assistant PT              PT Discharge Summary  Anticipated Discharge Disposition (PT): home with home health care  Reason for Discharge: Discharge from facility, Per MD order  Outcomes Achieved: Unable to make functional progress toward goals at this time  Discharge Destination: Home      Nitin Vieira PTA   4/23/2018

## 2018-04-23 NOTE — PROGRESS NOTES
GENERAL SURGERY PROGRESS NOTE  Chief Complaint:  Surgery Follow up   LOS: 5 days       Subjective     Interval History:     Feels well, tolerating diet. Had BM yesterday    Objective     Vital Signs  Temp:  [96.7 °F (35.9 °C)-98.1 °F (36.7 °C)] 97.7 °F (36.5 °C)  Heart Rate:  [68-91] 68  Resp:  [18] 18  BP: (116-137)/(65-88) 119/67    Physical Exam:   Abdomen soft, incision CDI with staples. XIOMARA serous  Labs:  Lab Results (last 24 hours)     ** No results found for the last 24 hours. **           Results Review:     Labs and imaging for today were reviewed.    Assessment/Plan     Sunny Ernandez is a 78 y.o. male who is s/p open right colon resection and VHR      DC XIOMARA  DC home today with follow up next week.          This document has been electronically signed by Frantz Shelley MD on April 23, 2018 7:33 AM        Frantz Shelley MD  04/23/18  7:33 AM

## 2018-04-25 LAB
LAB AP CASE REPORT: NORMAL
LAB AP SYNOPTIC CHECKLIST: NORMAL
Lab: NORMAL
PATH REPORT.FINAL DX SPEC: NORMAL
PATH REPORT.GROSS SPEC: NORMAL

## 2018-04-29 NOTE — DISCHARGE SUMMARY
Discharge Summary  Date: 04/23/18  Service: General Surgery  Attending: Frantz Shelley    Procedures: Right hemicolectomy with ventral hernia repair  Consults: None  Discharge Diagnoses: Right-sided colon cancer  Hospital Course: The patient was admitted to the intensive care unit following an open right colon resection with repair of a complex ventral hernia.  He did well in the ICU and was able to be transferred to the floor.  He had early resumption of bowel function and was able to be initiated on a diet.  At the time of discharge patient was tolerating a diet.  His pain was well controlled.  He was ambulatory.  Therefore, he was discharged home in stable condition.    Discharge diet regular    Discharge Medications:     Your medication list      START taking these medications      Instructions Last Dose Given Next Dose Due   HYDROcodone-acetaminophen 7.5-325 MG per tablet  Commonly known as:  NORCO      Take 1 tablet by mouth Every 6 (Six) Hours As Needed (pain).          CONTINUE taking these medications      Instructions Last Dose Given Next Dose Due   aspirin 81 MG EC tablet      Take 81 mg by mouth Every Night.       B-12 COMPLIANCE INJECTION 1000 MCG/ML kit  Generic drug:  Cyanocobalamin  Notes to patient:  Take as prescribe      Inject 1,000 mcg as directed Every 30 (Thirty) Days.       SIMVASTATIN PO      Take 40 mg by mouth Every Night.       VITAMIN D2 PO  Notes to patient:  Take as prescribe at home       Take 50,000 Units by mouth 2 (Two) Times a Week.             Where to Get Your Medications      You can get these medications from any pharmacy    Bring a paper prescription for each of these medications   HYDROcodone-acetaminophen 7.5-325 MG per tablet         Activity Instructions     Discharge Activity       1) No driving while taking narcotics.   2) May shower, no tub bath or submerging incisions  3) Do not lift / push / pull more than 15 lbs.            Your Scheduled Appointments    Apr 30,  2018 10:10 AM CDT  Post-Op with Frantz Shelley MD  [facility] GENERAL SURGERY (--) [address]  [address]  [facility] [address] [phone]   Vinod 15, 2018  9:45 AM CDT  LAB with NURSE FARHAN LADD  [facility] OP INFUSION ([place]) [address] [place] [id number] [phone]   Vinod 15, 2018 10:15 AM CDT  Follow Up with Dany Baig MD  [facility] HEMATOLOGY AND ONCOLOGY ([place]) [address] [place] [id number] [phone]   Arrive 15 minutes prior to appointment.                  This document has been electronically signed by Frantz Shelley MD on April 29, 2018 10:27 AM

## 2018-04-30 ENCOUNTER — OFFICE VISIT (OUTPATIENT)
Dept: SURGERY | Facility: CLINIC | Age: 78
End: 2018-04-30

## 2018-04-30 VITALS
HEIGHT: 71 IN | DIASTOLIC BLOOD PRESSURE: 74 MMHG | SYSTOLIC BLOOD PRESSURE: 110 MMHG | BODY MASS INDEX: 33.32 KG/M2 | WEIGHT: 238 LBS

## 2018-04-30 DIAGNOSIS — Z09 FOLLOW UP: Primary | ICD-10-CM

## 2018-04-30 PROCEDURE — 99024 POSTOP FOLLOW-UP VISIT: CPT | Performed by: SURGERY

## 2018-05-14 ENCOUNTER — OFFICE VISIT (OUTPATIENT)
Dept: SURGERY | Facility: CLINIC | Age: 78
End: 2018-05-14

## 2018-05-14 VITALS
DIASTOLIC BLOOD PRESSURE: 82 MMHG | BODY MASS INDEX: 32.9 KG/M2 | HEIGHT: 71 IN | WEIGHT: 235 LBS | SYSTOLIC BLOOD PRESSURE: 110 MMHG

## 2018-05-14 DIAGNOSIS — Z09 FOLLOW UP: Primary | ICD-10-CM

## 2018-05-14 PROCEDURE — 99024 POSTOP FOLLOW-UP VISIT: CPT | Performed by: SURGERY

## 2018-05-14 NOTE — PROGRESS NOTES
CHIEF COMPLAINT:    Chief Complaint   Patient presents with   • Follow-up     S/P Open right colon resection with primary repair of incisional hernias on 4/18/18.       HISTORY OF PRESENT ILLNESS:    Sunny Ernandez is a 78 y.o. male who underwent Open right colon resection with repair of incisional hernias on 4/18/2018.  He returns today for follow-up.  His staples were removed at the last visit.  Overall he states that he is doing well at home.  He has been eating and drinking well.  He's been having normal bowel function.    EXAM:  Vitals:    05/14/18 0917   BP: 110/82         Abdomen soft, incision well healed    ASSESSMENT:    Status post colon resection for cancer    PLAN:    Overall he appears to be healing appropriately.  I again instructed him in appropriate activity and lifting restrictions while healing particularly given his history of incisional hernias.  I will see him back in 1 month to recheck.        This document has been electronically signed by Frantz Shelley MD on May 14, 2018 9:48 AM

## 2018-06-04 NOTE — PROGRESS NOTES
CHIEF COMPLAINT:    Chief Complaint   Patient presents with   • Post-op Follow-up     P.O.Open Rt. Colon resection with primary repair of incisional hernias 4-18-18.       HISTORY OF PRESENT ILLNESS:    Sunny Ernandez is a 78 y.o. male who underwent Open right colon resection with incisional hernia repair on 4/18/2018.  He returns today for posthospitalization follow-up.  Overall he states that he is feeling well at home. Is eating and drinking well at home, having regular bowel function. Reports no fevers or chills.    Pathology did show a T3 N0 lesion, this was discussed with he and his grandson today.      EXAM:  Vitals:    04/30/18 1012   BP: 110/74         Abdomen soft, incision well healed.  Staples removed    ASSESSMENT:    Status post right colon resection for cancer and incisional hernia repair    PLAN:    Overall he appears to be healing appropriately.  We will arrange follow-up with oncology.  See me in 2 weeks.        This document has been electronically signed by Frantz Shelley MD on June 4, 2018 5:52 PM

## 2018-06-15 ENCOUNTER — LAB (OUTPATIENT)
Dept: ONCOLOGY | Facility: HOSPITAL | Age: 78
End: 2018-06-15

## 2018-06-15 ENCOUNTER — OFFICE VISIT (OUTPATIENT)
Dept: ONCOLOGY | Facility: CLINIC | Age: 78
End: 2018-06-15

## 2018-06-15 ENCOUNTER — TELEPHONE (OUTPATIENT)
Dept: ONCOLOGY | Facility: HOSPITAL | Age: 78
End: 2018-06-15

## 2018-06-15 VITALS
HEART RATE: 72 BPM | RESPIRATION RATE: 16 BRPM | BODY MASS INDEX: 33.07 KG/M2 | SYSTOLIC BLOOD PRESSURE: 134 MMHG | TEMPERATURE: 98.5 F | WEIGHT: 236.2 LBS | DIASTOLIC BLOOD PRESSURE: 83 MMHG | HEIGHT: 71 IN

## 2018-06-15 DIAGNOSIS — T45.1X5A NEUROPATHY DUE TO CHEMOTHERAPEUTIC DRUG (HCC): ICD-10-CM

## 2018-06-15 DIAGNOSIS — G62.0 NEUROPATHY DUE TO CHEMOTHERAPEUTIC DRUG (HCC): ICD-10-CM

## 2018-06-15 DIAGNOSIS — C18.2 MALIGNANT NEOPLASM OF ASCENDING COLON (HCC): ICD-10-CM

## 2018-06-15 DIAGNOSIS — Z85.038 HISTORY OF COLON CANCER: ICD-10-CM

## 2018-06-15 DIAGNOSIS — D64.9 ANEMIA, UNSPECIFIED TYPE: Primary | ICD-10-CM

## 2018-06-15 LAB
ALBUMIN SERPL-MCNC: 4 G/DL (ref 3.4–4.8)
ALBUMIN/GLOB SERPL: 1.4 G/DL (ref 1.1–1.8)
ALP SERPL-CCNC: 59 U/L (ref 38–126)
ALT SERPL W P-5'-P-CCNC: 20 U/L (ref 21–72)
ANION GAP SERPL CALCULATED.3IONS-SCNC: 9 MMOL/L (ref 5–15)
AST SERPL-CCNC: 25 U/L (ref 17–59)
BASOPHILS # BLD AUTO: 0.02 10*3/MM3 (ref 0–0.2)
BASOPHILS NFR BLD AUTO: 0.5 % (ref 0–2)
BILIRUB SERPL-MCNC: 0.6 MG/DL (ref 0.2–1.3)
BUN BLD-MCNC: 14 MG/DL (ref 7–21)
BUN/CREAT SERPL: 15.9 (ref 7–25)
CALCIUM SPEC-SCNC: 8.4 MG/DL (ref 8.4–10.2)
CEA SERPL-MCNC: 0.7 NG/ML (ref 0–5)
CHLORIDE SERPL-SCNC: 108 MMOL/L (ref 95–110)
CO2 SERPL-SCNC: 23 MMOL/L (ref 22–31)
CREAT BLD-MCNC: 0.88 MG/DL (ref 0.7–1.3)
DEPRECATED RDW RBC AUTO: 40.5 FL (ref 35.1–43.9)
EOSINOPHIL # BLD AUTO: 0.15 10*3/MM3 (ref 0–0.7)
EOSINOPHIL NFR BLD AUTO: 3.5 % (ref 0–7)
ERYTHROCYTE [DISTWIDTH] IN BLOOD BY AUTOMATED COUNT: 15.1 % (ref 11.5–14.5)
FERRITIN SERPL-MCNC: 14.9 NG/ML (ref 17.9–464)
FOLATE SERPL-MCNC: 7.66 NG/ML (ref 2.76–21)
GFR SERPL CREATININE-BSD FRML MDRD: 84 ML/MIN/1.73 (ref 42–98)
GLOBULIN UR ELPH-MCNC: 2.9 GM/DL (ref 2.3–3.5)
GLUCOSE BLD-MCNC: 119 MG/DL (ref 60–100)
HCT VFR BLD AUTO: 31.8 % (ref 39–49)
HGB BLD-MCNC: 10.3 G/DL (ref 13.7–17.3)
IMM GRANULOCYTES # BLD: 0 10*3/MM3 (ref 0–0.02)
IMM GRANULOCYTES NFR BLD: 0 % (ref 0–0.5)
IRON 24H UR-MRATE: 33 MCG/DL (ref 49–181)
IRON SATN MFR SERPL: 9 % (ref 20–55)
LYMPHOCYTES # BLD AUTO: 1.23 10*3/MM3 (ref 0.6–4.2)
LYMPHOCYTES NFR BLD AUTO: 28.3 % (ref 10–50)
MCH RBC QN AUTO: 24 PG (ref 26.5–34)
MCHC RBC AUTO-ENTMCNC: 32.4 G/DL (ref 31.5–36.3)
MCV RBC AUTO: 74 FL (ref 80–98)
MONOCYTES # BLD AUTO: 0.37 10*3/MM3 (ref 0–0.9)
MONOCYTES NFR BLD AUTO: 8.5 % (ref 0–12)
NEUTROPHILS # BLD AUTO: 2.57 10*3/MM3 (ref 2–8.6)
NEUTROPHILS NFR BLD AUTO: 59.2 % (ref 37–80)
PLATELET # BLD AUTO: 147 10*3/MM3 (ref 150–450)
PMV BLD AUTO: 11.7 FL (ref 8–12)
POTASSIUM BLD-SCNC: 4 MMOL/L (ref 3.5–5.1)
PROT SERPL-MCNC: 6.9 G/DL (ref 6.3–8.6)
RBC # BLD AUTO: 4.3 10*6/MM3 (ref 4.37–5.74)
SODIUM BLD-SCNC: 140 MMOL/L (ref 137–145)
TIBC SERPL-MCNC: 355 MCG/DL (ref 261–462)
VIT B12 BLD-MCNC: 717 PG/ML (ref 239–931)
WBC NRBC COR # BLD: 4.34 10*3/MM3 (ref 3.2–9.8)

## 2018-06-15 PROCEDURE — 85025 COMPLETE CBC W/AUTO DIFF WBC: CPT | Performed by: NURSE PRACTITIONER

## 2018-06-15 PROCEDURE — 82746 ASSAY OF FOLIC ACID SERUM: CPT | Performed by: INTERNAL MEDICINE

## 2018-06-15 PROCEDURE — 83550 IRON BINDING TEST: CPT | Performed by: INTERNAL MEDICINE

## 2018-06-15 PROCEDURE — 82378 CARCINOEMBRYONIC ANTIGEN: CPT | Performed by: NURSE PRACTITIONER

## 2018-06-15 PROCEDURE — 83540 ASSAY OF IRON: CPT | Performed by: INTERNAL MEDICINE

## 2018-06-15 PROCEDURE — 82607 VITAMIN B-12: CPT | Performed by: INTERNAL MEDICINE

## 2018-06-15 PROCEDURE — 80053 COMPREHEN METABOLIC PANEL: CPT | Performed by: NURSE PRACTITIONER

## 2018-06-15 PROCEDURE — 1123F ACP DISCUSS/DSCN MKR DOCD: CPT | Performed by: INTERNAL MEDICINE

## 2018-06-15 PROCEDURE — 99214 OFFICE O/P EST MOD 30 MIN: CPT | Performed by: INTERNAL MEDICINE

## 2018-06-15 PROCEDURE — 82728 ASSAY OF FERRITIN: CPT | Performed by: INTERNAL MEDICINE

## 2018-06-15 PROCEDURE — G0463 HOSPITAL OUTPT CLINIC VISIT: HCPCS | Performed by: INTERNAL MEDICINE

## 2018-06-15 RX ORDER — FERROUS SULFATE 325(65) MG
325 TABLET ORAL 2 TIMES DAILY WITH MEALS
Qty: 60 TABLET | Refills: 3 | Status: SHIPPED | OUTPATIENT
Start: 2018-06-15 | End: 2019-01-04 | Stop reason: SDUPTHER

## 2018-06-15 RX ORDER — FOLIC ACID 1 MG/1
1 TABLET ORAL DAILY
Qty: 90 TABLET | Refills: 1 | Status: SHIPPED | OUTPATIENT
Start: 2018-06-15 | End: 2019-07-01 | Stop reason: SDUPTHER

## 2018-06-15 RX ORDER — DOCUSATE SODIUM 100 MG/1
100 CAPSULE, LIQUID FILLED ORAL 2 TIMES DAILY PRN
Qty: 60 CAPSULE | Refills: 2 | Status: SHIPPED | OUTPATIENT
Start: 2018-06-15 | End: 2022-12-30

## 2018-06-15 NOTE — PROGRESS NOTES
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 Patient underwent colonoscopy on 04/04/2018 by Dr. Goins which showed infiltrating nonobstructing mass at the ileocecal valve.  Biopsy came back positive for adenocarcinoma.  Subsequently patient was referred to Dr. Shelley and underwent surgical resection on April 18, 2018.  Patient is here to discuss further recommendation regarding newly diagnosed adenocarcinoma arising from cecum.  Complains of 10 pound of weight lost in last 6 months.  Complains of fatigue.  Denies any new shortness of breath.  Denies any constipation or any blood in the stool.  Denies any new lymph node enlargement.    PAST MEDICAL HISTORY:    Past Medical History:   Diagnosis Date   • Colon cancer    • Hernia    • High cholesterol        SOCIAL HISTORY:    Social History   Substance Use Topics   • Smoking status: Former Smoker     Packs/day: 1.00     Years: 30.00     Types: Cigarettes     Quit date: 1997   • Smokeless tobacco: Current User     Types: Chew   • Alcohol use No       Surgical History :  Past Surgical History:   Procedure Laterality Date   • COLON RESECTION  2012   • COLON RESECTION N/A 4/18/2018    Procedure: Open Right Colon Resection with repair of incisional hernias;  Surgeon: Frantz Shelley MD;  Location: [facility];  Service: General   • COLON SURGERY     • COLONOSCOPY     • COLONOSCOPY N/A 4/4/2018    Procedure:  "COLONOSCOPY;  Surgeon: Austin Goins DO;  Location: [facility] ENDOSCOPY;  Service: Gastroenterology   • ENDOSCOPY N/A 4/4/2018    Procedure: ESOPHAGOGASTRODUODENOSCOPY;  Surgeon: Austin Goins DO;  Location: [facility] ENDOSCOPY;  Service: Gastroenterology   • FINGER SURGERY         ALLERGIES:    No Known Allergies      FAMILY HISTORY:  Family History   Problem Relation Age of Onset   • Lung cancer Mother            REVIEW OF SYSTEMS:      CONSTITUTIONAL: Complains of fatigue.  Admits to 10 pound of weight loss in last 6 months.States his appetite is good.  No fever, chills, or night sweats.     HEENT:  No epistaxis, mouth sores, or difficulty swallowing.    RESPIRATORY:  No new shortness of breath or cough at present.    CARDIOVASCULAR:  No chest pain or palpitations.    GASTROINTESTINAL:  No abdominal pain, nausea, vomiting, or blood in the stool.    GENITOURINARY:  No dysuria or hematuria.    MUSCULOSKELETAL:  Positive for chronic arthritic pain    NEUROLOGICAL:  Complains of chronic tingling and numbness affecting bilateral hand and bilateral faint since chemotherapy in 2012, denies any recent worsening. No new headache or dizziness.     LYMPHATICS:  Denies any abnormal swollen and anywhere in the body.    SKIN:  Denies any new skin rash.        PHYSICAL EXAMINATION:      VITAL SIGNS:  /83   Pulse 72   Temp 98.5 °F (36.9 °C) (Temporal Artery )   Resp 16   Ht 180.3 cm (70.98\")   Wt 107 kg (236 lb 3.2 oz)   BMI 32.96 kg/m²   1    06/15/18  0953   Weight: 107 kg (236 lb 3.2 oz)       ECOG  performance status: 2      GENERAL:  Not in any distress.    HEENT:  Normocephalic, Atraumatic.Mild Conjunctival pallor. No icterus. Extraocular Movements Intact. No Facial Asymmetry noted.    NECK:  No adenopathy. No JVD.    RESPIRATORY:  Fair air entry bilateral. No rhonchi or wheezing.    CARDIOVASCULAR:  S1, S2. Regular rate and rhythm. No murmur or gallop appreciated.    ABDOMEN:  Soft, obese, nontender. Bowel " sounds present in all four quadrants.  No organomegaly appreciated.  Well-healed surgical scar present in midline.    MUSCULOSKELETAL:  No edema.No Calf Tenderness.Decreased range of motion.    NEUROLOGIC:  Alert, awake and oriented ×3.  No  Motor deficit appreciated. Cranial Nerves 2-12 grossly intact.    SKIN : No new skin lesion identified    LYMPHATICS: No new enlarged lymph nodes in neck or supraclavicular area.    PSYCHIATRY: Normal affect.  Normal judgment.  Makes good eye contact.        DIAGNOSTIC DATA:    Glucose   Date Value Ref Range Status   04/21/2018 87 60 - 100 mg/dL Final     Sodium   Date Value Ref Range Status   04/21/2018 135 (L) 137 - 145 mmol/L Final     Potassium   Date Value Ref Range Status   04/21/2018 4.3 3.5 - 5.1 mmol/L Final     CO2   Date Value Ref Range Status   04/21/2018 21.0 (L) 22.0 - 31.0 mmol/L Final     Chloride   Date Value Ref Range Status   04/21/2018 100 95 - 110 mmol/L Final     Anion Gap   Date Value Ref Range Status   04/21/2018 14.0 5.0 - 15.0 mmol/L Final     Creatinine   Date Value Ref Range Status   04/21/2018 0.96 0.70 - 1.30 mg/dL Final     BUN   Date Value Ref Range Status   04/21/2018 16 7 - 21 mg/dL Final     BUN/Creatinine Ratio   Date Value Ref Range Status   04/21/2018 16.7 7.0 - 25.0 Final     Calcium   Date Value Ref Range Status   04/21/2018 7.6 (L) 8.4 - 10.2 mg/dL Final     eGFR Non  Amer   Date Value Ref Range Status   04/21/2018 76 42 - 98 mL/min/1.73 Final     Alkaline Phosphatase   Date Value Ref Range Status   04/19/2018 47 38 - 126 U/L Final     Total Protein   Date Value Ref Range Status   04/19/2018 5.7 (L) 6.3 - 8.6 g/dL Final     ALT (SGPT)   Date Value Ref Range Status   04/19/2018 18 (L) 21 - 72 U/L Final     AST (SGOT)   Date Value Ref Range Status   04/19/2018 26 17 - 59 U/L Final     Total Bilirubin   Date Value Ref Range Status   04/19/2018 0.6 0.2 - 1.3 mg/dL Final     Albumin   Date Value Ref Range Status   04/19/2018 3.10 (L)  3.40 - 4.80 g/dL Final     Globulin   Date Value Ref Range Status   04/19/2018 2.6 2.3 - 3.5 gm/dL Final     A/G Ratio   Date Value Ref Range Status   04/19/2018 1.2 1.1 - 1.8 g/dL Final     Lab Results   Component Value Date    WBC 4.34 06/15/2018    HGB 10.3 (L) 06/15/2018    HCT 31.8 (L) 06/15/2018    MCV 74.0 (L) 06/15/2018     (L) 06/15/2018     Lab Results   Component Value Date    NEUTROABS 2.57 06/15/2018    [id number] 682 02/11/2014    FOLATE 11.20 02/11/2014     Lab Results   Component Value Date    CEA 0.60 12/08/2017           PATHOLOGY:  Pathology  report from April 18, 2018 showed:  Final Diagnosis   SEGMENT OF TERMINAL ILEUM AND ASCENDING COLON:  ADENOCARCINOMA, MODERATELY DIFFERENTIATED WITH MUCINOUS FEATURES OF CECUM, 6.0 CM IN GREATEST DIMENSION.  THE NEOPLASM EXTENDS TO THE SUBSEROSAL FAT.   RESECTION MARGINS, ANGIOLYMPHATIC VESSELS AND 22 REGIONAL LYMPH  NODES ARE FREE OF TUMOR.  pT3-N/O  SEE SYNOPTIC REPORT         Electronically signed by Sanjay Fairbanks MD on 4/25/2018 at 0937   Synoptic Checklist   COLON AND RECTUM: Resection, Including Transanal Disk Excision of Rectal Neoplasms   Colon Res - All Specimens   SPECIMEN   Procedure  Right hemicolectomy   TUMOR   Tumor Site  Cecum   Histologic Type  Adenocarcinoma   Histologic Grade  G2: Moderately differentiated   Tumor Size  Greatest dimension in Centimeters (cm): 6 cm   Tumor Deposits  Not identified   Tumor Extent     Tumor Extension  Tumor invades through the muscularis propria into pericolorectal tissue   Macroscopic Tumor Perforation  Not identified   Accessory Findings     Lymphovascular Invasion  Not identified   Perineural Invasion  Not identified   Type of Polyp in Which Invasive Carcinoma Arose  Villous adenoma   Treatment Effect  No known presurgical therapy   MARGINS   Margins  All margins are uninvolved by invasive carcinoma, high-grade dysplasia, intramucosal adenocarcinoma, and adenoma   Distance of Invasive Carcinoma  from Closest Margin  Specify in Millimeters (mm): 60 mm   Specify Closest Margin  Proximal   LYMPH NODES   Number of Lymph Nodes Involved  Specify number: 0   Number of Lymph Nodes Examined  Specify number: 22   PATHOLOGIC STAGE CLASSIFICATION (pTNM)   Primary Tumor (pT)  pT3: Tumor invades through the muscularis propria into pericolorectal tissues   Regional Lymph Nodes (pN)  pN0: No regional lymph node metastasis                RADIOLOGY DATA :  CT of abdomen and pelvis with contrast done on April 6, 2018  At [facility] showed:  Result Impression     1. The 6 x 7 mm nodule in the middle lobe is an isolated finding that was not present on the May 2014 CT scan and is not confidently identified on the current chest film . A follow-up CT of the chest in 6 months is recommended .  2. Midline ventral hernias just above the umbilicus with no evidence of strangulation but possible low-grade obstruction; there is considerable barium in the colon and this does not appear to be causing problems  3. No evidence of metastatic disease or malignancy in the abdomen or pelvis               ASSESSMENT AND PLAN:      1.  Newly diagnosed adenocarcinoma of cecum, stage II, T3 N0 M0, 22 lymph nodes negative.  No lymphovascular invasion or perineural invasion.  No other high-risk feature on pathology.  -Result of pathology, CT of abdomen and pelvis and staging were discussed with patient and his family.   -It was discussed with patient and his family CT of chest to show 6 mm nodule in right middle lobe which needs close follow-up in 6 months.  -As per NCCN guidelines, treatment recommendation for stage II colon cancer includes close observation versus single agent chemotherapy with Xeloda or 5-FU/leucovorin.  Patient prefers observation without chemotherapy at this point.  -In view of low high-risk features on pathology report, recommend close observation with surveillance.  -We will repeat CEA level today along with CBC, anemia  workup.  -We will ask patient to return to clinic in 3 months with a repeat CBC, CMP and CEA   -In view of 6 mm lung nodule on right side, we will plan to do CT scan around December 2018 which will order upon next clinic visit in 3 months.      2.  Adenocarcinoma of transverse colon, stage III, T3 N1 M0 diagnosed in July 2012.  -Patient had a surgery followed by adjuvant chemotherapy consisting of FOLFOX finished in March 2013.  -We will continue with a clinical surveillance for now.    3.  Microcytic anemia: Secondary to chronic blood loss from malignancy.  -We will repeat anemia workup today with CBC, CMP, CEA , iron studies, B12 and folate.  - On anemia work up today, patient has Iron deficiency with low ferritin and low iron saturation.  Prescription for ferrous sulfate one tablet twice daily along with stool softener has been sent to his pharmacy today.  -Folate level is only 7, we'll also start him on folic acid 1 mg by mouth daily.  Prescription has been sent to his pharmacy today on Pily 15, 2018.    4.  Mild thrombocytopenia.  Platelet count is 147,000 today.  We'll monitored with CBC for now.  Patient denies any active bleeding.    5.  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  We'll monitor clinically for now without any medications.    6.  Health Maintenance: Patient does not smoke.  He just had a colonoscopy in April 2018 by Dr. Goins.  He will need surveillance colonoscopy in around April 2019.    7.  Advanced care planning: For now patient remains full code and is able to make his decisions.  Patient has health care surrogate mentioned on chart.      Dany Baig MD  6/15/2018  10:10 AM        EMR Dragon/Transcription disclaimer:   Much of this encounter note is an electronic transcription/translation of spoken language to printed text. The electronic translation of spoken language may permit erroneous, or at times, nonsensical words or phrases to be inadvertently  transcribed; Although I have reviewed the note for such errors, some may still exist.

## 2018-06-15 NOTE — TELEPHONE ENCOUNTER
----- Message from Dany Baig MD sent at 6/15/2018  1:14 PM CDT -----  I have sent prescription for ferrous sulfate, Colace and folic acid to his pharmacy.  Please let patient know.  Also let patient know that if he starts having any severe stomach upset or constipation from my end, he needs to call us and we can put him on some intravenous iron.  Thank you

## 2018-06-18 ENCOUNTER — OFFICE VISIT (OUTPATIENT)
Dept: SURGERY | Facility: CLINIC | Age: 78
End: 2018-06-18

## 2018-06-18 VITALS
SYSTOLIC BLOOD PRESSURE: 110 MMHG | BODY MASS INDEX: 33.18 KG/M2 | WEIGHT: 237 LBS | HEIGHT: 71 IN | DIASTOLIC BLOOD PRESSURE: 70 MMHG

## 2018-06-18 DIAGNOSIS — C18.2 MALIGNANT NEOPLASM OF ASCENDING COLON (HCC): Primary | ICD-10-CM

## 2018-06-18 PROCEDURE — 99024 POSTOP FOLLOW-UP VISIT: CPT | Performed by: SURGERY

## 2018-06-18 NOTE — PROGRESS NOTES
CHIEF COMPLAINT:    Chief Complaint   Patient presents with   • Follow-up     S/P Open right colon resection with primary repair of incisional hernias on 4/18/18.       HISTORY OF PRESENT ILLNESS:    Sunny Ernandez is a 78 y.o. male who underwent Right colon resection with incisional hernia repair on 4/18/2018.  He returns today for follow-up.  He has seen Dr. Baig and they have elected to proceed with close clinical observation as opposed to any chemotherapy for his cancer.  He notes no complaints today.    EXAM:  Vitals:    06/18/18 1027   BP: 110/70         Abdomen soft, incision well healed.  No evidence of recurrence of hernias.    ASSESSMENT:    Status post right colon resection and incisional hernia repair    PLAN:    Overall he appears to be doing well.  I will see him back in 6 months.        This document has been electronically signed by Frantz Shelley MD on June 18, 2018 10:46 AM

## 2018-09-11 DIAGNOSIS — D64.9 ANEMIA, UNSPECIFIED TYPE: ICD-10-CM

## 2018-09-11 DIAGNOSIS — C18.2 MALIGNANT NEOPLASM OF ASCENDING COLON (HCC): Primary | ICD-10-CM

## 2018-09-14 ENCOUNTER — TELEPHONE (OUTPATIENT)
Dept: ONCOLOGY | Facility: HOSPITAL | Age: 78
End: 2018-09-14

## 2018-09-14 ENCOUNTER — LAB (OUTPATIENT)
Dept: ONCOLOGY | Facility: HOSPITAL | Age: 78
End: 2018-09-14

## 2018-09-14 ENCOUNTER — OFFICE VISIT (OUTPATIENT)
Dept: ONCOLOGY | Facility: CLINIC | Age: 78
End: 2018-09-14

## 2018-09-14 VITALS
HEIGHT: 71 IN | RESPIRATION RATE: 16 BRPM | DIASTOLIC BLOOD PRESSURE: 87 MMHG | HEART RATE: 71 BPM | BODY MASS INDEX: 34.02 KG/M2 | SYSTOLIC BLOOD PRESSURE: 141 MMHG | WEIGHT: 243 LBS | OXYGEN SATURATION: 95 % | TEMPERATURE: 98.6 F

## 2018-09-14 DIAGNOSIS — C18.2 MALIGNANT NEOPLASM OF ASCENDING COLON (HCC): ICD-10-CM

## 2018-09-14 DIAGNOSIS — D64.9 ANEMIA, UNSPECIFIED TYPE: ICD-10-CM

## 2018-09-14 DIAGNOSIS — C18.2 MALIGNANT NEOPLASM OF ASCENDING COLON (HCC): Primary | ICD-10-CM

## 2018-09-14 DIAGNOSIS — D69.6 THROMBOCYTOPENIA (HCC): ICD-10-CM

## 2018-09-14 DIAGNOSIS — G62.0 NEUROPATHY DUE TO CHEMOTHERAPEUTIC DRUG (HCC): ICD-10-CM

## 2018-09-14 DIAGNOSIS — T45.1X5A NEUROPATHY DUE TO CHEMOTHERAPEUTIC DRUG (HCC): ICD-10-CM

## 2018-09-14 LAB
ALBUMIN SERPL-MCNC: 4.2 G/DL (ref 3.4–4.8)
ALBUMIN/GLOB SERPL: 1.3 G/DL (ref 1.1–1.8)
ALP SERPL-CCNC: 62 U/L (ref 38–126)
ALT SERPL W P-5'-P-CCNC: 28 U/L (ref 21–72)
ANION GAP SERPL CALCULATED.3IONS-SCNC: 11 MMOL/L (ref 5–15)
AST SERPL-CCNC: 25 U/L (ref 17–59)
BASOPHILS # BLD AUTO: 0.03 10*3/MM3 (ref 0–0.2)
BASOPHILS NFR BLD AUTO: 0.6 % (ref 0–2)
BILIRUB SERPL-MCNC: 1 MG/DL (ref 0.2–1.3)
BUN BLD-MCNC: 14 MG/DL (ref 7–21)
BUN/CREAT SERPL: 13.6 (ref 7–25)
CALCIUM SPEC-SCNC: 8.9 MG/DL (ref 8.4–10.2)
CEA SERPL-MCNC: 1.1 NG/ML (ref 0–5)
CHLORIDE SERPL-SCNC: 105 MMOL/L (ref 95–110)
CO2 SERPL-SCNC: 23 MMOL/L (ref 22–31)
CREAT BLD-MCNC: 1.03 MG/DL (ref 0.7–1.3)
DEPRECATED RDW RBC AUTO: 55.7 FL (ref 35.1–43.9)
EOSINOPHIL # BLD AUTO: 0.18 10*3/MM3 (ref 0–0.7)
EOSINOPHIL NFR BLD AUTO: 3.7 % (ref 0–7)
ERYTHROCYTE [DISTWIDTH] IN BLOOD BY AUTOMATED COUNT: 18.6 % (ref 11.5–14.5)
FERRITIN SERPL-MCNC: 35.3 NG/ML (ref 17.9–464)
FOLATE SERPL-MCNC: >20 NG/ML (ref 2.76–21)
GFR SERPL CREATININE-BSD FRML MDRD: 70 ML/MIN/1.73 (ref 42–98)
GLOBULIN UR ELPH-MCNC: 3.3 GM/DL (ref 2.3–3.5)
GLUCOSE BLD-MCNC: 101 MG/DL (ref 60–100)
HCT VFR BLD AUTO: 42.2 % (ref 39–49)
HGB BLD-MCNC: 14.6 G/DL (ref 13.7–17.3)
IMM GRANULOCYTES # BLD: 0.02 10*3/MM3 (ref 0–0.02)
IMM GRANULOCYTES NFR BLD: 0.4 % (ref 0–0.5)
IRON 24H UR-MRATE: 71 MCG/DL (ref 49–181)
IRON SATN MFR SERPL: 20 % (ref 20–55)
LYMPHOCYTES # BLD AUTO: 0.99 10*3/MM3 (ref 0.6–4.2)
LYMPHOCYTES NFR BLD AUTO: 20.5 % (ref 10–50)
MCH RBC QN AUTO: 28.2 PG (ref 26.5–34)
MCHC RBC AUTO-ENTMCNC: 34.6 G/DL (ref 31.5–36.3)
MCV RBC AUTO: 81.6 FL (ref 80–98)
MONOCYTES # BLD AUTO: 0.45 10*3/MM3 (ref 0–0.9)
MONOCYTES NFR BLD AUTO: 9.3 % (ref 0–12)
NEUTROPHILS # BLD AUTO: 3.17 10*3/MM3 (ref 2–8.6)
NEUTROPHILS NFR BLD AUTO: 65.5 % (ref 37–80)
PLATELET # BLD AUTO: 104 10*3/MM3 (ref 150–450)
PMV BLD AUTO: ABNORMAL FL (ref 8–12)
POTASSIUM BLD-SCNC: 4.2 MMOL/L (ref 3.5–5.1)
PROT SERPL-MCNC: 7.5 G/DL (ref 6.3–8.6)
RBC # BLD AUTO: 5.17 10*6/MM3 (ref 4.37–5.74)
SODIUM BLD-SCNC: 139 MMOL/L (ref 137–145)
TIBC SERPL-MCNC: 347 MCG/DL (ref 261–462)
VIT B12 BLD-MCNC: >1000 PG/ML (ref 239–931)
WBC NRBC COR # BLD: 4.84 10*3/MM3 (ref 3.2–9.8)

## 2018-09-14 PROCEDURE — 80053 COMPREHEN METABOLIC PANEL: CPT | Performed by: INTERNAL MEDICINE

## 2018-09-14 PROCEDURE — 82728 ASSAY OF FERRITIN: CPT | Performed by: INTERNAL MEDICINE

## 2018-09-14 PROCEDURE — 82607 VITAMIN B-12: CPT | Performed by: INTERNAL MEDICINE

## 2018-09-14 PROCEDURE — 82746 ASSAY OF FOLIC ACID SERUM: CPT | Performed by: INTERNAL MEDICINE

## 2018-09-14 PROCEDURE — G0463 HOSPITAL OUTPT CLINIC VISIT: HCPCS | Performed by: INTERNAL MEDICINE

## 2018-09-14 PROCEDURE — 85025 COMPLETE CBC W/AUTO DIFF WBC: CPT | Performed by: INTERNAL MEDICINE

## 2018-09-14 PROCEDURE — 83550 IRON BINDING TEST: CPT | Performed by: INTERNAL MEDICINE

## 2018-09-14 PROCEDURE — 1123F ACP DISCUSS/DSCN MKR DOCD: CPT | Performed by: INTERNAL MEDICINE

## 2018-09-14 PROCEDURE — 83540 ASSAY OF IRON: CPT | Performed by: INTERNAL MEDICINE

## 2018-09-14 PROCEDURE — 82378 CARCINOEMBRYONIC ANTIGEN: CPT | Performed by: INTERNAL MEDICINE

## 2018-09-14 PROCEDURE — 99214 OFFICE O/P EST MOD 30 MIN: CPT | Performed by: INTERNAL MEDICINE

## 2018-09-14 NOTE — PROGRESS NOTES
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 Patient underwent colonoscopy on 04/04/2018 by Dr. Goins which showed infiltrating nonobstructing mass at the ileocecal valve.  Biopsy came back positive for adenocarcinoma.  Subsequently patient was referred to Dr. Shelley and underwent surgical resection on April 18, 2018.  Upon last clinic visit in June 2018, patient was started on ferrous sulfate for iron deficiency.  Patient is here for follow-up appointment today.  States his fatigue is improved.  Denies any recent weight loss.  Denies any stomach upset or excessive nausea and vomiting with ferrous sulfate.       PAST MEDICAL HISTORY:    Past Medical History:   Diagnosis Date   • Colon cancer (CMS/HCC)    • Hernia    • High cholesterol        SOCIAL HISTORY:    Social History   Substance Use Topics   • Smoking status: Former Smoker     Packs/day: 1.00     Years: 30.00     Types: Cigarettes     Quit date: 1997   • Smokeless tobacco: Current User     Types: Chew   • Alcohol use No       Surgical History :  Past Surgical History:   Procedure Laterality Date   • COLON RESECTION  2012   • COLON RESECTION N/A 4/18/2018    Procedure: Open Right Colon Resection with repair of incisional hernias;  Surgeon: Frantz Shelley MD;  Location: [facility];  Service: General   • COLON SURGERY     • COLONOSCOPY     • COLONOSCOPY N/A 4/4/2018    Procedure: COLONOSCOPY;   "Surgeon: Austin Goins DO;  Location: [facility] ENDOSCOPY;  Service: Gastroenterology   • ENDOSCOPY N/A 4/4/2018    Procedure: ESOPHAGOGASTRODUODENOSCOPY;  Surgeon: Austin Goins DO;  Location: [facility] ENDOSCOPY;  Service: Gastroenterology   • FINGER SURGERY         ALLERGIES:    No Known Allergies      FAMILY HISTORY:  Family History   Problem Relation Age of Onset   • Lung cancer Mother            REVIEW OF SYSTEMS:      CONSTITUTIONAL: Complains of fatigue. Has gained 6 pounds since last clinic visit.  No fever, chills, or night sweats.     HEENT:  No epistaxis, mouth sores, or difficulty swallowing.    RESPIRATORY:  No new shortness of breath or cough at present.    CARDIOVASCULAR:  No chest pain or palpitations.    GASTROINTESTINAL:  No abdominal pain, nausea, vomiting, or blood in the stool.    GENITOURINARY:  No dysuria or hematuria.    MUSCULOSKELETAL:  Positive for chronic arthritic pain    NEUROLOGICAL:  Complains of chronic tingling and numbness affecting bilateral hand and bilateral faint since chemotherapy in 2012, denies any recent worsening. No new headache or dizziness.     LYMPHATICS:  Denies any abnormal swollen and anywhere in the body.    SKIN:  Denies any new skin rash.        PHYSICAL EXAMINATION:      VITAL SIGNS:  /87   Pulse 71   Temp 98.6 °F (37 °C) (Temporal Artery )   Resp 16   Ht 180.3 cm (70.98\")   Wt 110 kg (243 lb)   SpO2 95%   BMI 33.91 kg/m²   1    09/14/18  0947   Weight: 110 kg (243 lb)       ECOG  performance status: 2      GENERAL:  Not in any distress.    HEENT:  Normocephalic, Atraumatic.Mild Conjunctival pallor. No icterus. Extraocular Movements Intact. No Facial Asymmetry noted.    NECK:  No adenopathy. No JVD.    RESPIRATORY:  Fair air entry bilateral. No rhonchi or wheezing.    CARDIOVASCULAR:  S1, S2. Regular rate and rhythm. No murmur or gallop appreciated.    ABDOMEN:  Soft, obese, nontender. Bowel sounds present in all four quadrants.  No organomegaly " appreciated.  Well-healed surgical scar present in midline.    MUSCULOSKELETAL:  No edema.No Calf Tenderness.Decreased range of motion.    NEUROLOGIC:  Alert, awake and oriented ×3.  No  Motor deficit appreciated. Cranial Nerves 2-12 grossly intact.    SKIN : No new skin lesion identified    LYMPHATICS: No new enlarged lymph nodes in neck or supraclavicular area.    PSYCHIATRY: Normal affect.  Normal judgment.  Makes good eye contact.        DIAGNOSTIC DATA:    Glucose   Date Value Ref Range Status   09/14/2018 101 (H) 60 - 100 mg/dL Final     Sodium   Date Value Ref Range Status   09/14/2018 139 137 - 145 mmol/L Final     Potassium   Date Value Ref Range Status   09/14/2018 4.2 3.5 - 5.1 mmol/L Final     CO2   Date Value Ref Range Status   09/14/2018 23.0 22.0 - 31.0 mmol/L Final     Chloride   Date Value Ref Range Status   09/14/2018 105 95 - 110 mmol/L Final     Anion Gap   Date Value Ref Range Status   09/14/2018 11.0 5.0 - 15.0 mmol/L Final     Creatinine   Date Value Ref Range Status   09/14/2018 1.03 0.70 - 1.30 mg/dL Final     BUN   Date Value Ref Range Status   09/14/2018 14 7 - 21 mg/dL Final     BUN/Creatinine Ratio   Date Value Ref Range Status   09/14/2018 13.6 7.0 - 25.0 Final     Calcium   Date Value Ref Range Status   09/14/2018 8.9 8.4 - 10.2 mg/dL Final     eGFR Non  Amer   Date Value Ref Range Status   09/14/2018 70 42 - 98 mL/min/1.73 Final     Alkaline Phosphatase   Date Value Ref Range Status   09/14/2018 62 38 - 126 U/L Final     Total Protein   Date Value Ref Range Status   09/14/2018 7.5 6.3 - 8.6 g/dL Final     ALT (SGPT)   Date Value Ref Range Status   09/14/2018 28 21 - 72 U/L Final     AST (SGOT)   Date Value Ref Range Status   09/14/2018 25 17 - 59 U/L Final     Total Bilirubin   Date Value Ref Range Status   09/14/2018 1.0 0.2 - 1.3 mg/dL Final     Albumin   Date Value Ref Range Status   09/14/2018 4.20 3.40 - 4.80 g/dL Final     Globulin   Date Value Ref Range Status    09/14/2018 3.3 2.3 - 3.5 gm/dL Final     Lab Results   Component Value Date    WBC 4.84 09/14/2018    HGB 14.6 09/14/2018    HCT 42.2 09/14/2018    MCV 81.6 09/14/2018     (L) 09/14/2018     Lab Results   Component Value Date    NEUTROABS 3.17 09/14/2018    IRON 71 09/14/2018    TIBC 347 09/14/2018    LABIRON 20 09/14/2018    FERRITIN 35.30 09/14/2018    [id number] >1,000 (H) 09/14/2018    FOLATE >20.00 09/14/2018     Lab Results   Component Value Date    CEA 1.10 09/14/2018           PATHOLOGY:  Pathology  report from April 18, 2018 showed:  Final Diagnosis   SEGMENT OF TERMINAL ILEUM AND ASCENDING COLON:  ADENOCARCINOMA, MODERATELY DIFFERENTIATED WITH MUCINOUS FEATURES OF CECUM, 6.0 CM IN GREATEST DIMENSION.  THE NEOPLASM EXTENDS TO THE SUBSEROSAL FAT.   RESECTION MARGINS, ANGIOLYMPHATIC VESSELS AND 22 REGIONAL LYMPH  NODES ARE FREE OF TUMOR.  pT3-N/O  SEE SYNOPTIC REPORT         Electronically signed by Sanjay Fiarbanks MD on 4/25/2018 at 0937   Synoptic Checklist   COLON AND RECTUM: Resection, Including Transanal Disk Excision of Rectal Neoplasms   Colon Res - All Specimens   SPECIMEN   Procedure  Right hemicolectomy   TUMOR   Tumor Site  Cecum   Histologic Type  Adenocarcinoma   Histologic Grade  G2: Moderately differentiated   Tumor Size  Greatest dimension in Centimeters (cm): 6 cm   Tumor Deposits  Not identified   Tumor Extent     Tumor Extension  Tumor invades through the muscularis propria into pericolorectal tissue   Macroscopic Tumor Perforation  Not identified   Accessory Findings     Lymphovascular Invasion  Not identified   Perineural Invasion  Not identified   Type of Polyp in Which Invasive Carcinoma Arose  Villous adenoma   Treatment Effect  No known presurgical therapy   MARGINS   Margins  All margins are uninvolved by invasive carcinoma, high-grade dysplasia, intramucosal adenocarcinoma, and adenoma   Distance of Invasive Carcinoma from Closest Margin  Specify in Millimeters (mm): 60  mm   Specify Closest Margin  Proximal   LYMPH NODES   Number of Lymph Nodes Involved  Specify number: 0   Number of Lymph Nodes Examined  Specify number: 22   PATHOLOGIC STAGE CLASSIFICATION (pTNM)   Primary Tumor (pT)  pT3: Tumor invades through the muscularis propria into pericolorectal tissues   Regional Lymph Nodes (pN)  pN0: No regional lymph node metastasis                RADIOLOGY DATA :  CT of abdomen and pelvis with contrast done on April 6, 2018  At Located within [facility] showed:  Result Impression     1. The 6 x 7 mm nodule in the middle lobe is an isolated finding that was not present on the May 2014 CT scan and is not confidently identified on the current chest film . A follow-up CT of the chest in 6 months is recommended .  2. Midline ventral hernias just above the umbilicus with no evidence of strangulation but possible low-grade obstruction; there is considerable barium in the colon and this does not appear to be causing problems  3. No evidence of metastatic disease or malignancy in the abdomen or pelvis               ASSESSMENT AND PLAN:      1.  Newly diagnosed adenocarcinoma of cecum, stage II, T3 N0 M0, 22 lymph nodes negative.  No lymphovascular invasion or perineural invasion.  No other high-risk feature on pathology.  -Result of pathology, CT of abdomen and pelvis and staging were discussed with patient and his family.   -It was discussed with patient and his family CT of chest to show 6 mm nodule in right middle lobe which needs close follow-up in 6 months.  -As per NCCN guidelines, treatment recommendation for stage II colon cancer includes close observation versus single agent chemotherapy with Xeloda or 5-FU/leucovorin.  Patient prefers observation without chemotherapy at this point.  -In view of no high-risk features on pathology report, recommend close observation with surveillance.  -CEA level done today on September 14, 2018 was normal at 1.1.  -Will ask patient to return to clinic in 3 months  with repeat CBC, CMP, CEA, CT of chest, abdomen and pelvis for surveillance to be done prior to that      2.  Adenocarcinoma of transverse colon, stage III, T3 N1 M0 diagnosed in July 2012.  -Patient had a surgery followed by adjuvant chemotherapy consisting of FOLFOX finished in March 2013.  -We will continue with a clinical surveillance for now.    3.  Microcytic anemia: Secondary to chronic blood loss from malignancy.  -Upon last clinic visit on June 14, 2013 patient was found to have iron deficiency.  Patient was started on ferrous sulfate along with vitamin B12 and folic acid.  -Hemoglobin is improved to 14.6 today.  Iron studies also improved.  -Recommend continuing with ferrous sulfate for now.  We will repeat anemia workup in 3 months from now.    4.  Mild thrombocytopenia.  Platelet count is 108,000 today.  We'll monitored with CBC for now.  Patient denies any active bleeding.    5.  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  We'll monitor clinically for now without any medications.    6.  Health Maintenance: Patient does not smoke.  He just had a colonoscopy in April 2018 by Dr. Goins.  He will need surveillance colonoscopy in around April 2019.    7.  Advanced care planning: For now patient remains full code and is able to make his decisions.  Patient has health care surrogate mentioned on chart.      Dany Baig MD  9/14/2018  5:36 PM        EMR Dragon/Transcription disclaimer:   Much of this encounter note is an electronic transcription/translation of spoken language to printed text. The electronic translation of spoken language may permit erroneous, or at times, nonsensical words or phrases to be inadvertently transcribed; Although I have reviewed the note for such errors, some may still exist.

## 2018-09-14 NOTE — TELEPHONE ENCOUNTER
----- Message from Dany Baig MD sent at 9/14/2018  1:03 PM CDT -----  Please let patient know, he needs to continue taking ferrous sulfate for now until next clinic visit.  He can stop taking vitamin B12 and folic acid.  Thanks

## 2018-09-14 NOTE — PATIENT INSTRUCTIONS
Patient Instructions for CT Scan    · Your CT scan is being done without any oral contrast.  Your CT scan may be done with IV contrast, if you have an allergy to iodine--please tell your nurse.    · Do not eat or drink 4 hours prior to scan.     · You may take your medications with sips of water, except DO NOT take your diabetic pill the morning of the test.    Arrive at the Outpatient Surgery entrance at [facility] 20 minutes prior to appointment time.    You will receive a phone call with an appointment for your CT scan.  Please call our office, if someone does not contact you with 3 days.    Cici Quigley RN  September 14, 2018  10:27 AM

## 2018-12-11 ENCOUNTER — APPOINTMENT (OUTPATIENT)
Dept: CT IMAGING | Facility: HOSPITAL | Age: 78
End: 2018-12-11
Attending: INTERNAL MEDICINE

## 2018-12-11 ENCOUNTER — HOSPITAL ENCOUNTER (OUTPATIENT)
Dept: CT IMAGING | Facility: HOSPITAL | Age: 78
Discharge: HOME | End: 2018-12-11
Attending: INTERNAL MEDICINE | Admitting: INTERNAL MEDICINE

## 2018-12-11 ENCOUNTER — LAB (OUTPATIENT)
Dept: ONCOLOGY | Facility: HOSPITAL | Age: 78
End: 2018-12-11

## 2018-12-11 ENCOUNTER — APPOINTMENT (OUTPATIENT)
Dept: ONCOLOGY | Facility: HOSPITAL | Age: 78
End: 2018-12-11

## 2018-12-11 DIAGNOSIS — G62.0 NEUROPATHY DUE TO CHEMOTHERAPEUTIC DRUG (HCC): ICD-10-CM

## 2018-12-11 DIAGNOSIS — C18.2 MALIGNANT NEOPLASM OF ASCENDING COLON (HCC): ICD-10-CM

## 2018-12-11 DIAGNOSIS — T45.1X5A NEUROPATHY DUE TO CHEMOTHERAPEUTIC DRUG (HCC): ICD-10-CM

## 2018-12-11 DIAGNOSIS — D64.9 ANEMIA, UNSPECIFIED TYPE: ICD-10-CM

## 2018-12-11 DIAGNOSIS — D69.6 THROMBOCYTOPENIA (HCC): ICD-10-CM

## 2018-12-11 LAB
ALBUMIN SERPL-MCNC: 4.6 G/DL (ref 3.4–4.8)
ALBUMIN/GLOB SERPL: 1.6 G/DL (ref 1.1–1.8)
ALP SERPL-CCNC: 52 U/L (ref 38–126)
ALT SERPL W P-5'-P-CCNC: 29 U/L (ref 21–72)
ANION GAP SERPL CALCULATED.3IONS-SCNC: 10 MMOL/L (ref 5–15)
AST SERPL-CCNC: 25 U/L (ref 17–59)
BASOPHILS # BLD AUTO: 0.02 10*3/MM3 (ref 0–0.2)
BASOPHILS NFR BLD AUTO: 0.4 % (ref 0–2)
BILIRUB SERPL-MCNC: 1.2 MG/DL (ref 0.2–1.3)
BUN BLD-MCNC: 12 MG/DL (ref 7–21)
BUN/CREAT SERPL: 12 (ref 7–25)
CALCIUM SPEC-SCNC: 8.8 MG/DL (ref 8.4–10.2)
CHLORIDE SERPL-SCNC: 101 MMOL/L (ref 95–110)
CO2 SERPL-SCNC: 26 MMOL/L (ref 22–31)
CREAT BLD-MCNC: 1 MG/DL (ref 0.7–1.3)
DEPRECATED RDW RBC AUTO: 44.8 FL (ref 35.1–43.9)
EOSINOPHIL # BLD AUTO: 0.18 10*3/MM3 (ref 0–0.7)
EOSINOPHIL NFR BLD AUTO: 3.5 % (ref 0–7)
ERYTHROCYTE [DISTWIDTH] IN BLOOD BY AUTOMATED COUNT: 14.3 % (ref 11.5–14.5)
FERRITIN SERPL-MCNC: 58.2 NG/ML (ref 17.9–464)
FOLATE SERPL-MCNC: 11.3 NG/ML (ref 2.76–21)
GFR SERPL CREATININE-BSD FRML MDRD: 72 ML/MIN/1.73 (ref 42–98)
GLOBULIN UR ELPH-MCNC: 2.9 GM/DL (ref 2.3–3.5)
GLUCOSE BLD-MCNC: 94 MG/DL (ref 60–100)
HCT VFR BLD AUTO: 41 % (ref 39–49)
HGB BLD-MCNC: 14.4 G/DL (ref 13.7–17.3)
IMM GRANULOCYTES # BLD: 0.01 10*3/MM3 (ref 0–0.02)
IMM GRANULOCYTES NFR BLD: 0.2 % (ref 0–0.5)
IRON 24H UR-MRATE: 88 MCG/DL (ref 49–181)
IRON SATN MFR SERPL: 28 % (ref 20–55)
LYMPHOCYTES # BLD AUTO: 1.05 10*3/MM3 (ref 0.6–4.2)
LYMPHOCYTES NFR BLD AUTO: 20.3 % (ref 10–50)
MCH RBC QN AUTO: 30 PG (ref 26.5–34)
MCHC RBC AUTO-ENTMCNC: 35.1 G/DL (ref 31.5–36.3)
MCV RBC AUTO: 85.4 FL (ref 80–98)
MONOCYTES # BLD AUTO: 0.52 10*3/MM3 (ref 0–0.9)
MONOCYTES NFR BLD AUTO: 10.1 % (ref 0–12)
NEUTROPHILS # BLD AUTO: 3.38 10*3/MM3 (ref 2–8.6)
NEUTROPHILS NFR BLD AUTO: 65.5 % (ref 37–80)
PLATELET # BLD AUTO: 94 10*3/MM3 (ref 150–450)
PMV BLD AUTO: 12.2 FL (ref 8–12)
POTASSIUM BLD-SCNC: 4.3 MMOL/L (ref 3.5–5.1)
PROT SERPL-MCNC: 7.5 G/DL (ref 6.3–8.6)
RBC # BLD AUTO: 4.8 10*6/MM3 (ref 4.37–5.74)
SODIUM BLD-SCNC: 137 MMOL/L (ref 137–145)
TIBC SERPL-MCNC: 315 MCG/DL (ref 261–462)
WBC NRBC COR # BLD: 5.16 10*3/MM3 (ref 3.2–9.8)

## 2018-12-11 PROCEDURE — 82728 ASSAY OF FERRITIN: CPT | Performed by: INTERNAL MEDICINE

## 2018-12-11 PROCEDURE — 25010000002 IOPAMIDOL 61 % SOLUTION: Performed by: INTERNAL MEDICINE

## 2018-12-11 PROCEDURE — 85025 COMPLETE CBC W/AUTO DIFF WBC: CPT | Performed by: INTERNAL MEDICINE

## 2018-12-11 PROCEDURE — 83550 IRON BINDING TEST: CPT | Performed by: INTERNAL MEDICINE

## 2018-12-11 PROCEDURE — 82746 ASSAY OF FOLIC ACID SERUM: CPT | Performed by: INTERNAL MEDICINE

## 2018-12-11 PROCEDURE — 36415 COLL VENOUS BLD VENIPUNCTURE: CPT | Performed by: INTERNAL MEDICINE

## 2018-12-11 PROCEDURE — 82378 CARCINOEMBRYONIC ANTIGEN: CPT | Performed by: INTERNAL MEDICINE

## 2018-12-11 PROCEDURE — 83540 ASSAY OF IRON: CPT | Performed by: INTERNAL MEDICINE

## 2018-12-11 PROCEDURE — 74177 CT ABD & PELVIS W/CONTRAST: CPT

## 2018-12-11 PROCEDURE — 82607 VITAMIN B-12: CPT | Performed by: INTERNAL MEDICINE

## 2018-12-11 PROCEDURE — 80053 COMPREHEN METABOLIC PANEL: CPT | Performed by: INTERNAL MEDICINE

## 2018-12-11 PROCEDURE — 71260 CT THORAX DX C+: CPT

## 2018-12-11 RX ADMIN — IOPAMIDOL 93 ML: 612 INJECTION, SOLUTION INTRAVENOUS at 15:22

## 2018-12-12 LAB
CEA SERPL-MCNC: 1 NG/ML (ref 0–5)
VIT B12 BLD-MCNC: 350 PG/ML (ref 239–931)

## 2018-12-14 ENCOUNTER — OFFICE VISIT (OUTPATIENT)
Dept: ONCOLOGY | Facility: CLINIC | Age: 78
End: 2018-12-14

## 2018-12-14 VITALS
RESPIRATION RATE: 16 BRPM | BODY MASS INDEX: 35.14 KG/M2 | HEIGHT: 71 IN | WEIGHT: 251 LBS | DIASTOLIC BLOOD PRESSURE: 80 MMHG | TEMPERATURE: 98.8 F | OXYGEN SATURATION: 98 % | SYSTOLIC BLOOD PRESSURE: 133 MMHG

## 2018-12-14 DIAGNOSIS — T45.1X5A NEUROPATHY DUE TO CHEMOTHERAPEUTIC DRUG (HCC): ICD-10-CM

## 2018-12-14 DIAGNOSIS — D69.6 THROMBOCYTOPENIA (HCC): ICD-10-CM

## 2018-12-14 DIAGNOSIS — E53.8 B12 DEFICIENCY: ICD-10-CM

## 2018-12-14 DIAGNOSIS — G62.0 NEUROPATHY DUE TO CHEMOTHERAPEUTIC DRUG (HCC): ICD-10-CM

## 2018-12-14 DIAGNOSIS — C18.2 MALIGNANT NEOPLASM OF ASCENDING COLON (HCC): Primary | ICD-10-CM

## 2018-12-14 DIAGNOSIS — D64.9 ANEMIA, UNSPECIFIED TYPE: ICD-10-CM

## 2018-12-14 PROCEDURE — G8417 CALC BMI ABV UP PARAM F/U: HCPCS | Performed by: INTERNAL MEDICINE

## 2018-12-14 PROCEDURE — G0463 HOSPITAL OUTPT CLINIC VISIT: HCPCS | Performed by: INTERNAL MEDICINE

## 2018-12-14 PROCEDURE — 99214 OFFICE O/P EST MOD 30 MIN: CPT | Performed by: INTERNAL MEDICINE

## 2018-12-14 PROCEDURE — 1123F ACP DISCUSS/DSCN MKR DOCD: CPT | Performed by: INTERNAL MEDICINE

## 2018-12-14 NOTE — PROGRESS NOTES
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 Patient underwent colonoscopy on 2018 by Dr. Goins which showed infiltrating nonobstructing mass at the ileocecal valve.  Biopsy came back positive for adenocarcinoma.  Subsequently patient was referred to Dr. Shelley and underwent surgical resection on 2018.  Upon last clinic visit in 2018, patient was started on ferrous sulfate for iron deficiency.  Patient is here for follow-up appointment today.  States his fatigue is improved.  Complains of hemorrhoidal bleeding 3 weeks ago which is resolved now.  Denies any recent weight loss.  Denies any stomach upset or excessive nausea and vomiting with ferrous sulfate.       PAST MEDICAL HISTORY:    Past Medical History:   Diagnosis Date   • Colon cancer (CMS/HCC)    • Hernia    • High cholesterol        SOCIAL HISTORY:    Social History     Tobacco Use   • Smoking status: Former Smoker     Packs/day: 1.00     Years: 30.00     Pack years: 30.00     Types: Cigarettes     Last attempt to quit:      Years since quittin.9   • Smokeless tobacco: Current User     Types: Chew   Substance Use Topics   • Alcohol use: No   • Drug use: No       Surgical History :  Past Surgical History:   Procedure Laterality Date   • COLON RESECTION     • COLON SURGERY     • COLONOSCOPY     • FINGER SURGERY         ALLERGIES:    No Known Allergies      FAMILY  "HISTORY:  Family History   Problem Relation Age of Onset   • Lung cancer Mother            REVIEW OF SYSTEMS:      CONSTITUTIONAL: Complains of fatigue. Has gained 8 pounds since last clinic visit.  No fever, chills, or night sweats.     HEENT:  No epistaxis, mouth sores, or difficulty swallowing.    RESPIRATORY:  No new shortness of breath or cough at present.    CARDIOVASCULAR:  No chest pain or palpitations.    GASTROINTESTINAL: Complains of hemorrhoidal bleeding 3 weeks ago which is resolved now.  No abdominal pain, nausea, vomiting.    GENITOURINARY:  No dysuria or hematuria.    MUSCULOSKELETAL:  Positive for chronic arthritic pain    NEUROLOGICAL:  Complains of chronic tingling and numbness affecting bilateral hand and bilateral feet since chemotherapy in 2012, denies any recent worsening. No new headache or dizziness.     LYMPHATICS:  Denies any abnormal swollen and anywhere in the body.    SKIN:  Denies any new skin rash.        PHYSICAL EXAMINATION:      VITAL SIGNS:  /80   Temp 98.8 °F (37.1 °C) (Temporal)   Resp 16   Ht 180.3 cm (70.98\")   Wt 114 kg (251 lb)   SpO2 98%   BMI 35.02 kg/m²       12/14/18  0943   Weight: 114 kg (251 lb)       ECOG  performance status: 2      GENERAL:  Not in any distress.    HEENT:  Normocephalic, Atraumatic.Mild Conjunctival pallor. No icterus. Extraocular Movements Intact. No Facial Asymmetry noted.    NECK:  No adenopathy. No JVD.    RESPIRATORY:  Fair air entry bilateral. No rhonchi or wheezing.    CARDIOVASCULAR:  S1, S2. Regular rate and rhythm. No murmur or gallop appreciated.    ABDOMEN:  Soft, obese, nontender. Bowel sounds present in all four quadrants.  No hepatosplenomegaly appreciated.  Well-healed surgical scar present in midline.    MUSCULOSKELETAL:  No edema.No Calf Tenderness.Decreased range of motion.    NEUROLOGIC:  Alert, awake and oriented ×3.  No  Motor deficit appreciated. Cranial Nerves 2-12 grossly intact.    SKIN : No new skin lesion " identified    LYMPHATICS: No new enlarged lymph nodes in neck or supraclavicular area.    PSYCHIATRY: Normal affect.  Normal judgment.  Makes good eye contact.        DIAGNOSTIC DATA:    Glucose   Date Value Ref Range Status   12/11/2018 94 60 - 100 mg/dL Final     Sodium   Date Value Ref Range Status   12/11/2018 137 137 - 145 mmol/L Final     Potassium   Date Value Ref Range Status   12/11/2018 4.3 3.5 - 5.1 mmol/L Final     CO2   Date Value Ref Range Status   12/11/2018 26.0 22.0 - 31.0 mmol/L Final     Chloride   Date Value Ref Range Status   12/11/2018 101 95 - 110 mmol/L Final     Anion Gap   Date Value Ref Range Status   12/11/2018 10.0 5.0 - 15.0 mmol/L Final     Creatinine   Date Value Ref Range Status   12/11/2018 1.00 0.70 - 1.30 mg/dL Final     BUN   Date Value Ref Range Status   12/11/2018 12 7 - 21 mg/dL Final     BUN/Creatinine Ratio   Date Value Ref Range Status   12/11/2018 12.0 7.0 - 25.0 Final     Calcium   Date Value Ref Range Status   12/11/2018 8.8 8.4 - 10.2 mg/dL Final     eGFR Non  Amer   Date Value Ref Range Status   12/11/2018 72 42 - 98 mL/min/1.73 Final     Alkaline Phosphatase   Date Value Ref Range Status   12/11/2018 52 38 - 126 U/L Final     Total Protein   Date Value Ref Range Status   12/11/2018 7.5 6.3 - 8.6 g/dL Final     ALT (SGPT)   Date Value Ref Range Status   12/11/2018 29 21 - 72 U/L Final     AST (SGOT)   Date Value Ref Range Status   12/11/2018 25 17 - 59 U/L Final     Total Bilirubin   Date Value Ref Range Status   12/11/2018 1.2 0.2 - 1.3 mg/dL Final     Albumin   Date Value Ref Range Status   12/11/2018 4.60 3.40 - 4.80 g/dL Final     Globulin   Date Value Ref Range Status   12/11/2018 2.9 2.3 - 3.5 gm/dL Final     Lab Results   Component Value Date    WBC 5.16 12/11/2018    HGB 14.4 12/11/2018    HCT 41.0 12/11/2018    MCV 85.4 12/11/2018    PLT 94 (L) 12/11/2018     Lab Results   Component Value Date    NEUTROABS 3.38 12/11/2018    IRON 88 12/11/2018    TIBC  315 12/11/2018    LABIRON 28 12/11/2018    FERRITIN 58.20 12/11/2018    [id number] 350 12/11/2018    FOLATE 11.30 12/11/2018     Lab Results   Component Value Date    CEA 1.00 12/11/2018           PATHOLOGY:  Pathology  report from April 18, 2018 showed:  Final Diagnosis   SEGMENT OF TERMINAL ILEUM AND ASCENDING COLON:  ADENOCARCINOMA, MODERATELY DIFFERENTIATED WITH MUCINOUS FEATURES OF CECUM, 6.0 CM IN GREATEST DIMENSION.  THE NEOPLASM EXTENDS TO THE SUBSEROSAL FAT.   RESECTION MARGINS, ANGIOLYMPHATIC VESSELS AND 22 REGIONAL LYMPH  NODES ARE FREE OF TUMOR.  pT3-N/O  SEE SYNOPTIC REPORT         Electronically signed by Sanjay Fairbanks MD on 4/25/2018 at 0937   Synoptic Checklist   COLON AND RECTUM: Resection, Including Transanal Disk Excision of Rectal Neoplasms   Colon Res - All Specimens   SPECIMEN   Procedure  Right hemicolectomy   TUMOR   Tumor Site  Cecum   Histologic Type  Adenocarcinoma   Histologic Grade  G2: Moderately differentiated   Tumor Size  Greatest dimension in Centimeters (cm): 6 cm   Tumor Deposits  Not identified   Tumor Extent     Tumor Extension  Tumor invades through the muscularis propria into pericolorectal tissue   Macroscopic Tumor Perforation  Not identified   Accessory Findings     Lymphovascular Invasion  Not identified   Perineural Invasion  Not identified   Type of Polyp in Which Invasive Carcinoma Arose  Villous adenoma   Treatment Effect  No known presurgical therapy   MARGINS   Margins  All margins are uninvolved by invasive carcinoma, high-grade dysplasia, intramucosal adenocarcinoma, and adenoma   Distance of Invasive Carcinoma from Closest Margin  Specify in Millimeters (mm): 60 mm   Specify Closest Margin  Proximal   LYMPH NODES   Number of Lymph Nodes Involved  Specify number: 0   Number of Lymph Nodes Examined  Specify number: 22   PATHOLOGIC STAGE CLASSIFICATION (pTNM)   Primary Tumor (pT)  pT3: Tumor invades through the muscularis propria into pericolorectal tissues    Regional Lymph Nodes (pN)  pN0: No regional lymph node metastasis                RADIOLOGY DATA :  CT of chest, abdomen and pelvis with contrast on December 11, 2018 was reviewed, discussed with patient, it showed:  IMPRESSION:  CONCLUSION: Benign calcified granulomata in both lung fields and   stable benign noncalcified nodule right middle lobe. No change  since prior exam. Benign cysts in both kidneys, unchanged since  prior exam. Evidence of resection of the ascending colon. Midline  abdominal wall hernia containing nonobstructed loops of bowel. CT  chest, abdomen pelvis is otherwise unremarkable. No evidence of  any recurrent or metastatic disease.        CT of abdomen and pelvis with contrast done on April 6, 2018  At [facility] showed:  Result Impression     1. The 6 x 7 mm nodule in the middle lobe is an isolated finding that was not present on the May 2014 CT scan and is not confidently identified on the current chest film . A follow-up CT of the chest in 6 months is recommended .  2. Midline ventral hernias just above the umbilicus with no evidence of strangulation but possible low-grade obstruction; there is considerable barium in the colon and this does not appear to be causing problems  3. No evidence of metastatic disease or malignancy in the abdomen or pelvis               ASSESSMENT AND PLAN:      1.  Newly diagnosed adenocarcinoma of cecum, stage II, T3 N0 M0, 22 lymph nodes negative.  No lymphovascular invasion or perineural invasion.  No other high-risk feature on pathology.  -Result of pathology, CT of abdomen and pelvis and staging were discussed with patient and his family.  -It was discussed with patient and his family CT of chest to show 6 mm nodule in right middle lobe which needs close follow-up in 6 months.  -As per NCCN guidelines, treatment recommendation for stage II colon cancer includes close observation versus single agent chemotherapy with Xeloda or 5-FU/leucovorin.  Patient  prefers observation without chemotherapy at this point.  -In view of no high-risk features on pathology report, recommend close observation with surveillance.  -CEA level done today on December 11, 2018 was normal at 1.0.  -80 of chest, abdomen and pelvis with contrast December 11, 2018 does not show any evidence of recurrence.  Result of CT scan were discussed with patient.  -Next Surveillance CT scan will be Due in December 2019.  Patient will need surveillance colonoscopy around April 2019.  -Will ask patient to return to clinic in 3 months with repeat CBC, CMP, CEA, and anemia workup to be done on that day      2.  Adenocarcinoma of transverse colon, stage III, T3 N1 M0 diagnosed in July 2012.  -Patient had a surgery followed by adjuvant chemotherapy consisting of FOLFOX finished in March 2013.  -We will continue with a clinical surveillance for now.    3.  Microcytic anemia: Secondary to chronic blood loss from malignancy.  -Upon l clinic visit on June 14, 2013 patient was found to have iron deficiency.  Patient was started on ferrous sulfate along with vitamin B12 and folic acid.  -Hemoglobin is improved to 14.4 today.  Iron studies also improved.  -Recommend continuing with ferrous sulfate for now.      4.  Vitamin B12 deficiency:  -Patient was found to have a borderline B12 of 320 today.  Patient is to take vitamin B12 injection every month at home that he has stopped doing for last few months.  Since vitamin B12 number is decreasing recommend doing B12 injection every 2 weeks until next clinic visit in 3 months.  -Prescription for intramuscular vitamin B12 supply has been sent to his pharmacy today on December 14, 2018.    5.  Mild thrombocytopenia.  Platelet count is 94,000 today.  We'll monitored with CBC for now.  Patient denies any active bleeding. If Patient has any worsening of thrombocytopenia and future, he will need bone marrow biopsy to rule out myelodysplasia which was discussed with  patient.    6.  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  We'll monitor clinically for now without any medications.    7.  Health Maintenance: Patient does not smoke.  He just had a colonoscopy in April 2018 by Dr. Goins.  He will need surveillance colonoscopy in around April 2019.    8.  Advanced care planning: For now patient remains full code and is able to make his decisions.  Patient has health care surrogate mentioned on chart.    9. BMI: Patient's Body mass index is 35.02 kg/m². BMI is higher than reference range.  Patient was notified about her elevated BMI.      Dany Baig MD  12/14/2018  10:07 AM        EMR Dragon/Transcription disclaimer:   Much of this encounter note is an electronic transcription/translation of spoken language to printed text. The electronic translation of spoken language may permit erroneous, or at times, nonsensical words or phrases to be inadvertently transcribed; Although I have reviewed the note for such errors, some may still exist.

## 2019-01-04 RX ORDER — PNV NO.95/FERROUS FUM/FOLIC AC 28MG-0.8MG
TABLET ORAL
Qty: 60 TABLET | Refills: 3 | Status: SHIPPED | OUTPATIENT
Start: 2019-01-04 | End: 2019-09-03 | Stop reason: SDUPTHER

## 2019-03-13 ENCOUNTER — APPOINTMENT (OUTPATIENT)
Dept: ONCOLOGY | Facility: HOSPITAL | Age: 79
End: 2019-03-13

## 2019-03-13 ENCOUNTER — LAB (OUTPATIENT)
Dept: ONCOLOGY | Facility: HOSPITAL | Age: 79
End: 2019-03-13

## 2019-03-13 DIAGNOSIS — D64.9 ANEMIA, UNSPECIFIED TYPE: ICD-10-CM

## 2019-03-13 DIAGNOSIS — G62.0 NEUROPATHY DUE TO CHEMOTHERAPEUTIC DRUG (HCC): ICD-10-CM

## 2019-03-13 DIAGNOSIS — T45.1X5A NEUROPATHY DUE TO CHEMOTHERAPEUTIC DRUG (HCC): ICD-10-CM

## 2019-03-13 DIAGNOSIS — E53.8 B12 DEFICIENCY: ICD-10-CM

## 2019-03-13 DIAGNOSIS — D69.6 THROMBOCYTOPENIA (HCC): ICD-10-CM

## 2019-03-13 DIAGNOSIS — C18.2 MALIGNANT NEOPLASM OF ASCENDING COLON (HCC): ICD-10-CM

## 2019-03-13 LAB
ALBUMIN SERPL-MCNC: 4.4 G/DL (ref 3.4–4.8)
ALBUMIN/GLOB SERPL: 1.3 G/DL (ref 1.1–1.8)
ALP SERPL-CCNC: 48 U/L (ref 38–126)
ALT SERPL W P-5'-P-CCNC: 23 U/L (ref 21–72)
ANION GAP SERPL CALCULATED.3IONS-SCNC: 10 MMOL/L (ref 5–15)
AST SERPL-CCNC: 28 U/L (ref 17–59)
BASOPHILS # BLD AUTO: 0.05 10*3/MM3 (ref 0–0.2)
BASOPHILS NFR BLD AUTO: 1.1 % (ref 0–1.5)
BILIRUB SERPL-MCNC: 1.3 MG/DL (ref 0.2–1.3)
BUN BLD-MCNC: 15 MG/DL (ref 7–21)
BUN/CREAT SERPL: 16.1 (ref 7–25)
CALCIUM SPEC-SCNC: 8.7 MG/DL (ref 8.4–10.2)
CHLORIDE SERPL-SCNC: 100 MMOL/L (ref 95–110)
CO2 SERPL-SCNC: 26 MMOL/L (ref 22–31)
CREAT BLD-MCNC: 0.93 MG/DL (ref 0.7–1.3)
DEPRECATED RDW RBC AUTO: 41 FL (ref 37–54)
EOSINOPHIL # BLD AUTO: 0.17 10*3/MM3 (ref 0–0.4)
EOSINOPHIL NFR BLD AUTO: 3.8 % (ref 0.3–6.2)
ERYTHROCYTE [DISTWIDTH] IN BLOOD BY AUTOMATED COUNT: 13.2 % (ref 12.3–15.4)
FERRITIN SERPL-MCNC: 88.6 NG/ML (ref 17.9–464)
FOLATE SERPL-MCNC: 11.3 NG/ML (ref 2.76–21)
GFR SERPL CREATININE-BSD FRML MDRD: 78 ML/MIN/1.73 (ref 42–98)
GLOBULIN UR ELPH-MCNC: 3.3 GM/DL (ref 2.3–3.5)
GLUCOSE BLD-MCNC: 94 MG/DL (ref 60–100)
HCT VFR BLD AUTO: 43.1 % (ref 37.5–51)
HGB BLD-MCNC: 14.7 G/DL (ref 13–17.7)
IMM GRANULOCYTES # BLD AUTO: 0.02 10*3/MM3 (ref 0–0.05)
IMM GRANULOCYTES NFR BLD AUTO: 0.4 % (ref 0–0.5)
IRON 24H UR-MRATE: 99 MCG/DL (ref 49–181)
IRON SATN MFR SERPL: 33 % (ref 20–55)
LYMPHOCYTES # BLD AUTO: 0.94 10*3/MM3 (ref 0.7–3.1)
LYMPHOCYTES NFR BLD AUTO: 20.8 % (ref 19.6–45.3)
MCH RBC QN AUTO: 29.3 PG (ref 26.6–33)
MCHC RBC AUTO-ENTMCNC: 34.1 G/DL (ref 31.5–35.7)
MCV RBC AUTO: 86 FL (ref 79–97)
MONOCYTES # BLD AUTO: 0.39 10*3/MM3 (ref 0.1–0.9)
MONOCYTES NFR BLD AUTO: 8.6 % (ref 5–12)
NEUTROPHILS # BLD AUTO: 2.96 10*3/MM3 (ref 1.4–7)
NEUTROPHILS NFR BLD AUTO: 65.3 % (ref 42.7–76)
NRBC BLD AUTO-RTO: 0 /100 WBC (ref 0–0)
PLATELET # BLD AUTO: 92 10*3/MM3 (ref 140–450)
PMV BLD AUTO: 12.5 FL (ref 6–12)
POTASSIUM BLD-SCNC: 4.2 MMOL/L (ref 3.5–5.1)
PROT SERPL-MCNC: 7.7 G/DL (ref 6.3–8.6)
RBC # BLD AUTO: 5.01 10*6/MM3 (ref 4.14–5.8)
SODIUM BLD-SCNC: 136 MMOL/L (ref 137–145)
TIBC SERPL-MCNC: 304 MCG/DL (ref 261–462)
VIT B12 BLD-MCNC: 476 PG/ML (ref 239–931)
WBC NRBC COR # BLD: 4.53 10*3/MM3 (ref 3.4–10.8)

## 2019-03-13 PROCEDURE — 83540 ASSAY OF IRON: CPT | Performed by: INTERNAL MEDICINE

## 2019-03-13 PROCEDURE — 80053 COMPREHEN METABOLIC PANEL: CPT | Performed by: INTERNAL MEDICINE

## 2019-03-13 PROCEDURE — 83550 IRON BINDING TEST: CPT | Performed by: INTERNAL MEDICINE

## 2019-03-13 PROCEDURE — 82728 ASSAY OF FERRITIN: CPT | Performed by: INTERNAL MEDICINE

## 2019-03-13 PROCEDURE — 36415 COLL VENOUS BLD VENIPUNCTURE: CPT | Performed by: INTERNAL MEDICINE

## 2019-03-13 PROCEDURE — 82607 VITAMIN B-12: CPT | Performed by: INTERNAL MEDICINE

## 2019-03-13 PROCEDURE — 82746 ASSAY OF FOLIC ACID SERUM: CPT | Performed by: INTERNAL MEDICINE

## 2019-03-13 PROCEDURE — 82378 CARCINOEMBRYONIC ANTIGEN: CPT | Performed by: INTERNAL MEDICINE

## 2019-03-13 PROCEDURE — 85025 COMPLETE CBC W/AUTO DIFF WBC: CPT | Performed by: INTERNAL MEDICINE

## 2019-03-14 LAB — CEA SERPL-MCNC: 1.2 NG/ML (ref 0–5)

## 2019-03-15 ENCOUNTER — OFFICE VISIT (OUTPATIENT)
Dept: ONCOLOGY | Facility: CLINIC | Age: 79
End: 2019-03-15

## 2019-03-15 VITALS
TEMPERATURE: 98.4 F | BODY MASS INDEX: 35.59 KG/M2 | OXYGEN SATURATION: 93 % | DIASTOLIC BLOOD PRESSURE: 81 MMHG | SYSTOLIC BLOOD PRESSURE: 134 MMHG | WEIGHT: 254.2 LBS | HEIGHT: 71 IN | RESPIRATION RATE: 16 BRPM | HEART RATE: 67 BPM

## 2019-03-15 DIAGNOSIS — C18.2 MALIGNANT NEOPLASM OF ASCENDING COLON (HCC): Primary | ICD-10-CM

## 2019-03-15 DIAGNOSIS — T45.1X5A NEUROPATHY DUE TO CHEMOTHERAPEUTIC DRUG (HCC): ICD-10-CM

## 2019-03-15 DIAGNOSIS — E53.8 B12 DEFICIENCY: ICD-10-CM

## 2019-03-15 DIAGNOSIS — G62.0 NEUROPATHY DUE TO CHEMOTHERAPEUTIC DRUG (HCC): ICD-10-CM

## 2019-03-15 DIAGNOSIS — D69.6 THROMBOCYTOPENIA (HCC): ICD-10-CM

## 2019-03-15 DIAGNOSIS — D64.9 ANEMIA, UNSPECIFIED TYPE: ICD-10-CM

## 2019-03-15 PROCEDURE — 99214 OFFICE O/P EST MOD 30 MIN: CPT | Performed by: INTERNAL MEDICINE

## 2019-03-15 PROCEDURE — 1123F ACP DISCUSS/DSCN MKR DOCD: CPT | Performed by: INTERNAL MEDICINE

## 2019-03-15 PROCEDURE — G8417 CALC BMI ABV UP PARAM F/U: HCPCS | Performed by: INTERNAL MEDICINE

## 2019-03-15 PROCEDURE — G0463 HOSPITAL OUTPT CLINIC VISIT: HCPCS | Performed by: INTERNAL MEDICINE

## 2019-03-15 NOTE — PROGRESS NOTES
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 Patient underwent colonoscopy on 2018 by Dr. Goins which showed infiltrating nonobstructing mass at the ileocecal valve.  Biopsy came back positive for adenocarcinoma.  Subsequently patient was referred to Dr. Shelely and underwent surgical resection on 2018.    Patient is here for follow-up appointment today.  States his fatigue is improved.    Denies any recent weight loss.  Denies any stomach upset or excessive nausea and vomiting with ferrous sulfate.       PAST MEDICAL HISTORY:    Past Medical History:   Diagnosis Date   • Colon cancer (CMS/HCC)    • Hernia    • High cholesterol        SOCIAL HISTORY:    Social History     Tobacco Use   • Smoking status: Former Smoker     Packs/day: 1.00     Years: 30.00     Pack years: 30.00     Types: Cigarettes     Last attempt to quit:      Years since quittin.2   • Smokeless tobacco: Current User     Types: Chew   Substance Use Topics   • Alcohol use: No   • Drug use: No       Surgical History :  Past Surgical History:   Procedure Laterality Date   • COLON RESECTION     • COLON RESECTION N/A 2018    Procedure: Open Right Colon Resection with repair of incisional hernias;  Surgeon: Frantz Shelley MD;  Location: [facility];  Service: General   • COLON SURGERY     • COLONOSCOPY     • COLONOSCOPY N/A 2018    Procedure: COLONOSCOPY;   "Surgeon: Austin Goins DO;  Location: [facility] ENDOSCOPY;  Service: Gastroenterology   • ENDOSCOPY N/A 4/4/2018    Procedure: ESOPHAGOGASTRODUODENOSCOPY;  Surgeon: Austin Goins DO;  Location: [facility] ENDOSCOPY;  Service: Gastroenterology   • FINGER SURGERY         ALLERGIES:    No Known Allergies      FAMILY HISTORY:  Family History   Problem Relation Age of Onset   • Lung cancer Mother            REVIEW OF SYSTEMS:      CONSTITUTIONAL: Complains of fatigue. Has gained 3 pounds since last clinic visit.  No fever, chills, or night sweats.     HEENT:  No epistaxis, mouth sores, or difficulty swallowing.    RESPIRATORY:  No new shortness of breath or cough at present.    CARDIOVASCULAR:  No chest pain or palpitations.    GASTROINTESTINAL:   No abdominal pain, nausea, vomiting or blood in stool.    GENITOURINARY:  No dysuria or hematuria.    MUSCULOSKELETAL:  Positive for chronic arthritic pain    NEUROLOGICAL:  Complains of chronic tingling and numbness affecting bilateral hand and bilateral feet since chemotherapy in 2012, denies any recent worsening. No new headache or dizziness.     LYMPHATICS:  Denies any abnormal swollen and anywhere in the body.    SKIN:  Denies any new skin rash.        PHYSICAL EXAMINATION:      VITAL SIGNS:  /81   Pulse 67   Temp 98.4 °F (36.9 °C) (Temporal)   Resp 16   Ht 180.3 cm (70.98\")   Wt 115 kg (254 lb 3.2 oz)   SpO2 93%   BMI 35.47 kg/m²       03/15/19  1038   Weight: 115 kg (254 lb 3.2 oz)       ECOG  performance status: 2      GENERAL:  Not in any distress.    HEENT:  Normocephalic, Atraumatic.Mild Conjunctival pallor. No icterus. Extraocular Movements Intact. No Facial Asymmetry noted.    NECK:  No adenopathy. No JVD.    RESPIRATORY:  Fair air entry bilateral. No rhonchi or wheezing.    CARDIOVASCULAR:  S1, S2. Regular rate and rhythm. No murmur or gallop appreciated.    ABDOMEN:  Soft, obese, nontender. Bowel sounds present in all four quadrants.  No " hepatosplenomegaly appreciated.  Well-healed surgical scar present in midline.    MUSCULOSKELETAL:  No edema.No Calf Tenderness.Decreased range of motion.    NEUROLOGIC:  Alert, awake and oriented ×3.  No  Motor deficit appreciated. Cranial Nerves 2-12 grossly intact.    SKIN : No new skin lesion identified    LYMPHATICS: No new enlarged lymph nodes in neck or supraclavicular area.    PSYCHIATRY: Normal affect.  Normal judgment.  Makes good eye contact.        DIAGNOSTIC DATA:    Glucose   Date Value Ref Range Status   03/13/2019 94 60 - 100 mg/dL Final     Sodium   Date Value Ref Range Status   03/13/2019 136 (L) 137 - 145 mmol/L Final     Potassium   Date Value Ref Range Status   03/13/2019 4.2 3.5 - 5.1 mmol/L Final     CO2   Date Value Ref Range Status   03/13/2019 26.0 22.0 - 31.0 mmol/L Final     Chloride   Date Value Ref Range Status   03/13/2019 100 95 - 110 mmol/L Final     Anion Gap   Date Value Ref Range Status   03/13/2019 10.0 5.0 - 15.0 mmol/L Final     Creatinine   Date Value Ref Range Status   03/13/2019 0.93 0.70 - 1.30 mg/dL Final     BUN   Date Value Ref Range Status   03/13/2019 15 7 - 21 mg/dL Final     BUN/Creatinine Ratio   Date Value Ref Range Status   03/13/2019 16.1 7.0 - 25.0 Final     Calcium   Date Value Ref Range Status   03/13/2019 8.7 8.4 - 10.2 mg/dL Final     eGFR Non  Amer   Date Value Ref Range Status   03/13/2019 78 42 - 98 mL/min/1.73 Final     Alkaline Phosphatase   Date Value Ref Range Status   03/13/2019 48 38 - 126 U/L Final     Total Protein   Date Value Ref Range Status   03/13/2019 7.7 6.3 - 8.6 g/dL Final     ALT (SGPT)   Date Value Ref Range Status   03/13/2019 23 21 - 72 U/L Final     AST (SGOT)   Date Value Ref Range Status   03/13/2019 28 17 - 59 U/L Final     Total Bilirubin   Date Value Ref Range Status   03/13/2019 1.3 0.2 - 1.3 mg/dL Final     Albumin   Date Value Ref Range Status   03/13/2019 4.40 3.40 - 4.80 g/dL Final     Globulin   Date Value Ref Range  Status   03/13/2019 3.3 2.3 - 3.5 gm/dL Final     Lab Results   Component Value Date    WBC 4.53 03/13/2019    HGB 14.7 03/13/2019    HCT 43.1 03/13/2019    MCV 86.0 03/13/2019    PLT 92 (L) 03/13/2019     Lab Results   Component Value Date    NEUTROABS 2.96 03/13/2019    IRON 99 03/13/2019    TIBC 304 03/13/2019    LABIRON 33 03/13/2019    FERRITIN 88.60 03/13/2019    [id number] 476 03/13/2019    FOLATE 11.30 03/13/2019     Lab Results   Component Value Date    CEA 1.20 03/13/2019           PATHOLOGY:  Pathology  report from April 18, 2018 showed:  Final Diagnosis   SEGMENT OF TERMINAL ILEUM AND ASCENDING COLON:  ADENOCARCINOMA, MODERATELY DIFFERENTIATED WITH MUCINOUS FEATURES OF CECUM, 6.0 CM IN GREATEST DIMENSION.  THE NEOPLASM EXTENDS TO THE SUBSEROSAL FAT.   RESECTION MARGINS, ANGIOLYMPHATIC VESSELS AND 22 REGIONAL LYMPH  NODES ARE FREE OF TUMOR.  pT3-N/O  SEE SYNOPTIC REPORT         Electronically signed by Sanjay Fairbanks MD on 4/25/2018 at 0937   Synoptic Checklist   COLON AND RECTUM: Resection, Including Transanal Disk Excision of Rectal Neoplasms   Colon Res - All Specimens   SPECIMEN   Procedure  Right hemicolectomy   TUMOR   Tumor Site  Cecum   Histologic Type  Adenocarcinoma   Histologic Grade  G2: Moderately differentiated   Tumor Size  Greatest dimension in Centimeters (cm): 6 cm   Tumor Deposits  Not identified   Tumor Extent     Tumor Extension  Tumor invades through the muscularis propria into pericolorectal tissue   Macroscopic Tumor Perforation  Not identified   Accessory Findings     Lymphovascular Invasion  Not identified   Perineural Invasion  Not identified   Type of Polyp in Which Invasive Carcinoma Arose  Villous adenoma   Treatment Effect  No known presurgical therapy   MARGINS   Margins  All margins are uninvolved by invasive carcinoma, high-grade dysplasia, intramucosal adenocarcinoma, and adenoma   Distance of Invasive Carcinoma from Closest Margin  Specify in Millimeters (mm): 60 mm    Specify Closest Margin  Proximal   LYMPH NODES   Number of Lymph Nodes Involved  Specify number: 0   Number of Lymph Nodes Examined  Specify number: 22   PATHOLOGIC STAGE CLASSIFICATION (pTNM)   Primary Tumor (pT)  pT3: Tumor invades through the muscularis propria into pericolorectal tissues   Regional Lymph Nodes (pN)  pN0: No regional lymph node metastasis                RADIOLOGY DATA :  CT of chest, abdomen and pelvis with contrast on December 11, 2018 was reviewed, discussed with patient, it showed:  IMPRESSION:  CONCLUSION: Benign calcified granulomata in both lung fields and   stable benign noncalcified nodule right middle lobe. No change  since prior exam. Benign cysts in both kidneys, unchanged since  prior exam. Evidence of resection of the ascending colon. Midline  abdominal wall hernia containing nonobstructed loops of bowel. CT  chest, abdomen pelvis is otherwise unremarkable. No evidence of  any recurrent or metastatic disease.        CT of abdomen and pelvis with contrast done on April 6, 2018  At [facility] showed:  Result Impression     1. The 6 x 7 mm nodule in the middle lobe is an isolated finding that was not present on the May 2014 CT scan and is not confidently identified on the current chest film . A follow-up CT of the chest in 6 months is recommended .  2. Midline ventral hernias just above the umbilicus with no evidence of strangulation but possible low-grade obstruction; there is considerable barium in the colon and this does not appear to be causing problems  3. No evidence of metastatic disease or malignancy in the abdomen or pelvis               ASSESSMENT AND PLAN:      1.  Newly diagnosed adenocarcinoma of cecum, stage II, T3 N0 M0, 22 lymph nodes negative.  No lymphovascular invasion or perineural invasion.  No other high-risk feature on pathology.  -Result of pathology, CT of abdomen and pelvis and staging were discussed with patient and his family.  -It was discussed with  patient and his family CT of chest to show 6 mm nodule in right middle lobe which needs close follow-up in 6 months.  -As per NCCN guidelines, treatment recommendation for stage II colon cancer includes close observation versus single agent chemotherapy with Xeloda or 5-FU/leucovorin.  Patient prefers observation without chemotherapy at this point.  -In view of no high-risk features on pathology report, recommend close observation with surveillance.  -CEA level done today on December 11, 2018 was normal at 1.0.  -CT of chest, abdomen and pelvis with contrast December 11, 2018 does not show any evidence of recurrence.  Result of CT scan were discussed with patient.  -Next Surveillance CT scan will be Due in December 2019.   - Patient will need surveillance colonoscopy around April 2019.  We will get him an appointment with Dr. Goins for colonoscopy.  -Will ask patient to return to clinic in 3 months with repeat CBC, CMP, CEA, and anemia workup to be done on that day      2.  Adenocarcinoma of transverse colon, stage III, T3 N1 M0 diagnosed in July 2012.  -Patient had a surgery followed by adjuvant chemotherapy consisting of FOLFOX finished in March 2013.  -We will continue with a clinical surveillance for now.    3.  Microcytic anemia: Secondary to chronic blood loss from malignancy.  -Upon l clinic visit on June 14, 2013 patient was found to have iron deficiency.  Patient was started on ferrous sulfate along with vitamin B12 and folic acid.  -Hemoglobin is improved to 14.7 today.  Iron studies also improved.  -Recommend continuing with ferrous sulfate for now.      4.  Vitamin B12 deficiency:  -Patient was found to have a borderline B12 of 320 today.  Patient is to take vitamin B12 injection every month at home that he has stopped doing for last few months.  Since vitamin B12 number is decreasing recommend doing B12 injection every 2 weeks until next clinic visit in 3 months.  -Prescription for intramuscular  vitamin B12 supply has been sent to his pharmacy  on December 14, 2018.    5.  Mild thrombocytopenia.  Platelet count is 92,000 today.  We'll monitored with CBC for now.  Patient denies any active bleeding. If Patient has any worsening of thrombocytopenia and future, he will need bone marrow biopsy to rule out myelodysplasia which was discussed with patient.    6.  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  We'll monitor clinically for now without any medications.    7.  Health Maintenance: Patient does not smoke.  He just had a colonoscopy in April 2018 by Dr. Goins.  He will need surveillance colonoscopy in around April 2019.    8.  Advanced care planning: For now patient remains full code and is able to make his decisions.  Patient has health care surrogate mentioned on chart.    9. BMI: Patient's Body mass index is 35.47 kg/m². BMI is higher than reference range.  Patient was notified about her elevated BMI.      Dany Baig MD  3/15/2019  11:06 AM        EMR Dragon/Transcription disclaimer:   Much of this encounter note is an electronic transcription/translation of spoken language to printed text. The electronic translation of spoken language may permit erroneous, or at times, nonsensical words or phrases to be inadvertently transcribed; Although I have reviewed the note for such errors, some may still exist.

## 2019-04-15 ENCOUNTER — ANESTHESIA EVENT (OUTPATIENT)
Dept: GASTROENTEROLOGY | Facility: HOSPITAL | Age: 79
End: 2019-04-15

## 2019-04-15 ENCOUNTER — ANESTHESIA (OUTPATIENT)
Dept: GASTROENTEROLOGY | Facility: HOSPITAL | Age: 79
End: 2019-04-15

## 2019-04-15 ENCOUNTER — HOSPITAL ENCOUNTER (OUTPATIENT)
Facility: HOSPITAL | Age: 79
Setting detail: HOSPITAL OUTPATIENT SURGERY
Discharge: HOME | End: 2019-04-15
Attending: INTERNAL MEDICINE | Admitting: INTERNAL MEDICINE

## 2019-04-15 VITALS
RESPIRATION RATE: 18 BRPM | TEMPERATURE: 97 F | DIASTOLIC BLOOD PRESSURE: 67 MMHG | WEIGHT: 247 LBS | HEIGHT: 71 IN | SYSTOLIC BLOOD PRESSURE: 108 MMHG | OXYGEN SATURATION: 92 % | HEART RATE: 56 BPM | BODY MASS INDEX: 34.58 KG/M2

## 2019-04-15 DIAGNOSIS — C18.0 ADENOCARCINOMA OF CECUM (HCC): ICD-10-CM

## 2019-04-15 PROCEDURE — 25010000002 MIDAZOLAM PER 1 MG: Performed by: NURSE ANESTHETIST, CERTIFIED REGISTERED

## 2019-04-15 PROCEDURE — 25010000002 FENTANYL CITRATE (PF) 100 MCG/2ML SOLUTION: Performed by: NURSE ANESTHETIST, CERTIFIED REGISTERED

## 2019-04-15 PROCEDURE — 88305 TISSUE EXAM BY PATHOLOGIST: CPT | Performed by: PATHOLOGY

## 2019-04-15 PROCEDURE — 25010000002 PROPOFOL 10 MG/ML EMULSION: Performed by: NURSE ANESTHETIST, CERTIFIED REGISTERED

## 2019-04-15 PROCEDURE — 88305 TISSUE EXAM BY PATHOLOGIST: CPT | Performed by: INTERNAL MEDICINE

## 2019-04-15 RX ORDER — DEXTROSE AND SODIUM CHLORIDE 5; .45 G/100ML; G/100ML
30 INJECTION, SOLUTION INTRAVENOUS CONTINUOUS
Status: DISCONTINUED | OUTPATIENT
Start: 2019-04-15 | End: 2019-04-15 | Stop reason: HOSPADM

## 2019-04-15 RX ORDER — PROPOFOL 10 MG/ML
VIAL (ML) INTRAVENOUS AS NEEDED
Status: DISCONTINUED | OUTPATIENT
Start: 2019-04-15 | End: 2019-04-15 | Stop reason: SURG

## 2019-04-15 RX ORDER — MIDAZOLAM HYDROCHLORIDE 1 MG/ML
INJECTION INTRAMUSCULAR; INTRAVENOUS AS NEEDED
Status: DISCONTINUED | OUTPATIENT
Start: 2019-04-15 | End: 2019-04-15 | Stop reason: SURG

## 2019-04-15 RX ORDER — FENTANYL CITRATE 50 UG/ML
INJECTION, SOLUTION INTRAMUSCULAR; INTRAVENOUS AS NEEDED
Status: DISCONTINUED | OUTPATIENT
Start: 2019-04-15 | End: 2019-04-15 | Stop reason: SURG

## 2019-04-15 RX ADMIN — FENTANYL CITRATE 25 MCG: 50 INJECTION, SOLUTION INTRAMUSCULAR; INTRAVENOUS at 15:30

## 2019-04-15 RX ADMIN — PROPOFOL 20 MG: 10 INJECTION, EMULSION INTRAVENOUS at 15:35

## 2019-04-15 RX ADMIN — PROPOFOL 50 MG: 10 INJECTION, EMULSION INTRAVENOUS at 15:32

## 2019-04-15 RX ADMIN — DEXTROSE AND SODIUM CHLORIDE 30 ML/HR: 5; 450 INJECTION, SOLUTION INTRAVENOUS at 14:52

## 2019-04-15 RX ADMIN — MIDAZOLAM HYDROCHLORIDE 0.5 MG: 2 INJECTION, SOLUTION INTRAMUSCULAR; INTRAVENOUS at 15:30

## 2019-04-15 RX ADMIN — PROPOFOL 20 MG: 10 INJECTION, EMULSION INTRAVENOUS at 15:37

## 2019-04-15 NOTE — ANESTHESIA POSTPROCEDURE EVALUATION
Patient: Sunny Ernandez    Procedure Summary     Date:  04/15/19 Room / Location:  [facility] ENDOSCOPY 2 / [facility] ENDOSCOPY    Anesthesia Start:  1529 Anesthesia Stop:  1542    Procedure:  COLONOSCOPY (N/A ) Diagnosis:       Adenocarcinoma of cecum (CMS/HCC)      (Adenocarcinoma of cecum (CMS/HCC) [C18.0])    Surgeon:  Austin Goins DO Provider:  Abimbola Burger CRNA    Anesthesia Type:  MAC ASA Status:  3          Anesthesia Type: MAC  Last vitals  BP   144/89 (04/15/19 1440)   Temp   98 °F (36.7 °C) (04/15/19 1440)   Pulse   64 (04/15/19 1440)   Resp   18 (04/15/19 1440)     SpO2   96 % (04/15/19 1440)     Post Anesthesia Care and Evaluation    Patient location during evaluation: bedside  Patient participation: complete - patient participated  Level of consciousness: awake  Pain score: 0  Pain management: adequate  Airway patency: patent  Anesthetic complications: No anesthetic complications  PONV Status: none  Cardiovascular status: acceptable and hemodynamically stable  Respiratory status: acceptable and spontaneous ventilation  Hydration status: acceptable

## 2019-04-15 NOTE — H&P
Agusto Pal DO,[facility]  Gastroenterology  Hepatology  Endoscopy  Board Certified in Internal Medicine and gastroenterology  [address], [address]  [place]. [address]  - [phone]   F - [phone]     GASTROENTEROLOGY HISTORY AND PHYSICAL  NOTE   AGUSTO PAL DO.         SUBJECTIVE:   4/15/2019    Name: Sunny Ernandez  DOD: 1940        Chief Complaint:     Subjective : Personal history of colon cancer status post right colon resection    Patient is 79 y.o. male presents with desire for elective colonoscopy for follow-up of colon cancer.  One year out.      ROS/HISTORY/ CURRENT MEDICATIONS/OBJECTIVE/VS/PE:   Review of Systems:  All systems unremarkable unless specified below.  Constitutional   HENT  Eyes   Respiratory    Cardiovascular  Gastrointestinal   Endocrine  Genitourinary    Musculoskeletal   Skin  Allergic/Immunologic    Neurological    Hematological  Psychiatric/Behavioral    History:     Past Medical History:   Diagnosis Date   • Colon cancer (CMS/HCC)    • Hernia    • High cholesterol      Past Surgical History:   Procedure Laterality Date   • COLON RESECTION  2012   • COLON RESECTION N/A 4/18/2018    Procedure: Open Right Colon Resection with repair of incisional hernias;  Surgeon: Frantz Shelley MD;  Location: [facility] OR;  Service: General   • COLON SURGERY     • COLONOSCOPY     • COLONOSCOPY N/A 4/4/2018    Procedure: COLONOSCOPY;  Surgeon: Agusto Pal DO;  Location: [facility] ENDOSCOPY;  Service: Gastroenterology   • ENDOSCOPY N/A 4/4/2018    Procedure: ESOPHAGOGASTRODUODENOSCOPY;  Surgeon: Agusto Pal DO;  Location: [facility] ENDOSCOPY;  Service: Gastroenterology   • FINGER SURGERY       Family History   Problem Relation Age of Onset   • Lung cancer Mother      Social History     Tobacco Use   • Smoking status: Former Smoker     Packs/day: 1.00     Years: 30.00     Pack years: 30.00     Types: Cigarettes     Last attempt to quit: 1997      Years since quittin.2   • Smokeless tobacco: Current User     Types: Chew   Substance Use Topics   • Alcohol use: No   • Drug use: No     Medications Prior to Admission   Medication Sig Dispense Refill Last Dose   • Cyanocobalamin (B-12 COMPLIANCE INJECTION) 1000 MCG/ML kit Inject 1,000 mcg as directed Every 14 (Fourteen) Days. 1 kit 4 2019 at Unknown time   • docusate sodium (COLACE) 100 MG capsule Take 1 capsule by mouth 2 (Two) Times a Day As Needed for Constipation. 60 capsule 2 2019 at Unknown time   • Ergocalciferol (VITAMIN D2 PO) Take 50,000 Units by mouth 2 (Two) Times a Week.   2019 at Unknown time   • Ferrous Sulfate (IRON) 325 (65 Fe) MG tablet TAKE 1 TABLET BY MOUTH TWICE DAILY WITH MEALS 60 tablet 3 2019 at Unknown time   • folic acid (FOLVITE) 1 MG tablet Take 1 tablet by mouth Daily. 90 tablet 1 2019 at Unknown time   • HYDROcodone-acetaminophen (NORCO) 7.5-325 MG per tablet Take 1 tablet by mouth Every 6 (Six) Hours As Needed (pain). 40 tablet 0 Past Week at Unknown time   • SIMVASTATIN PO Take 40 mg by mouth Every Night.   2019 at Unknown time   • aspirin 81 MG EC tablet Take 81 mg by mouth Every Night.   2019     Allergies:  Patient has no known allergies.    I have reviewed the patients medical history, surgical history and family history in the available medical record system.     Current Medications:     Current Facility-Administered Medications   Medication Dose Route Frequency Provider Last Rate Last Dose   • dextrose 5 % and sodium chloride 0.45 % infusion  30 mL/hr Intravenous Continuous Austin Goins DO 30 mL/hr at 04/15/19 1452 30 mL/hr at 04/15/19 1452       Objective     Physical Exam:   Temp:  [98 °F (36.7 °C)] 98 °F (36.7 °C)  Heart Rate:  [64] 64  Resp:  [18] 18  BP: (144)/(89) 144/89    Physical Exam:  General Appearance:    Alert, cooperative, in no acute distress   Head:    Normocephalic, without obvious abnormality, atraumatic   Eyes:             Lids and lashes normal, conjunctivae and sclerae normal, no   icterus, no pallor, corneas clear, PERRLA   Ears:    Ears appear intact with no abnormalities noted   Throat:   No oral lesions, no thrush, oral mucosa moist   Neck:   No adenopathy, supple, trachea midline, no thyromegaly, no     carotid bruit, no JVD   Back:     No kyphosis present, no scoliosis present, no skin lesions,       erythema or scars, no tenderness to percussion or                   palpation,   range of motion normal   Lungs:     Clear to auscultation,respirations regular, even and                   unlabored    Heart:    Regular rhythm and normal rate, normal S1 and S2, no            murmur, no gallop, no rub, no click   Breast Exam:    Deferred   Abdomen:     Normal bowel sounds, no masses, no organomegaly, soft        non-tender, non-distended, no guarding, no rebound                 tenderness   Genitalia:    Deferred   Extremities:   Moves all extremities well, no edema, no cyanosis, no              redness   Pulses:   Pulses palpable and equal bilaterally   Skin:   No bleeding, bruising or rash   Lymph nodes:   No palpable adenopathy   Neurologic:   Cranial nerves 2 - 12 grossly intact, sensation intact, DTR        present and equal bilaterally      Results Review:     Lab Results   Component Value Date    WBC 4.53 03/13/2019    WBC 5.16 12/11/2018    WBC 4.84 09/14/2018    HGB 14.7 03/13/2019    HGB 14.4 12/11/2018    HGB 14.6 09/14/2018    HCT 43.1 03/13/2019    HCT 41.0 12/11/2018    HCT 42.2 09/14/2018    PLT 92 (L) 03/13/2019    PLT 94 (L) 12/11/2018     (L) 09/14/2018             No results found for: LIPASE  No results found for: INR       Radiology Review:  Imaging Results (last 72 hours)     ** No results found for the last 72 hours. **           I reviewed the patient's new clinical results.  I reviewed the patient's new imaging results and agree with the interpretation.     ASSESSMENT/PLAN:   ASSESSMENT:  1.   Personal history of colon cancer    PLAN:  1.  Colonoscopy    Risk and benefits associated with the procedure are reviewed with the patient.  The patient wished to proceed     Austin Goins DO  04/15/19  3:22 PM

## 2019-04-15 NOTE — ANESTHESIA PREPROCEDURE EVALUATION
Anesthesia Evaluation     NPO Solid Status: > 8 hours  NPO Liquid Status: > 4 hours           Airway   Mallampati: II  TM distance: >3 FB  Neck ROM: limited  No difficulty expected  Dental      Pulmonary     breath sounds clear to auscultation  Cardiovascular   Exercise tolerance: good (4-7 METS)    Rhythm: regular  Rate: normal    (+) hyperlipidemia,       Neuro/Psych  GI/Hepatic/Renal/Endo      Musculoskeletal     Abdominal    Substance History      OB/GYN          Other                      Anesthesia Plan    ASA 3     MAC     Anesthetic plan, all risks, benefits, and alternatives have been provided, discussed and informed consent has been obtained with: patient.    Plan discussed with CRNA.

## 2019-04-17 LAB
LAB AP CASE REPORT: NORMAL
PATH REPORT.FINAL DX SPEC: NORMAL
PATH REPORT.GROSS SPEC: NORMAL

## 2019-06-10 NOTE — INTERVAL H&P NOTE
H&P reviewed. The patient was examined and there are no changes to the H&P.           This document has been electronically signed by Frantz Shelley MD on April 18, 2018 9:10 AM          
none

## 2019-06-19 ENCOUNTER — LAB (OUTPATIENT)
Dept: ONCOLOGY | Facility: HOSPITAL | Age: 79
End: 2019-06-19

## 2019-06-19 DIAGNOSIS — G62.0 NEUROPATHY DUE TO CHEMOTHERAPEUTIC DRUG (HCC): ICD-10-CM

## 2019-06-19 DIAGNOSIS — C18.2 MALIGNANT NEOPLASM OF ASCENDING COLON (HCC): ICD-10-CM

## 2019-06-19 DIAGNOSIS — D64.9 ANEMIA, UNSPECIFIED TYPE: ICD-10-CM

## 2019-06-19 DIAGNOSIS — D69.6 THROMBOCYTOPENIA (HCC): ICD-10-CM

## 2019-06-19 DIAGNOSIS — T45.1X5A NEUROPATHY DUE TO CHEMOTHERAPEUTIC DRUG (HCC): ICD-10-CM

## 2019-06-19 DIAGNOSIS — E53.8 B12 DEFICIENCY: ICD-10-CM

## 2019-06-19 LAB
ALBUMIN SERPL-MCNC: 4.2 G/DL (ref 3.5–5.2)
ALBUMIN/GLOB SERPL: 1.6 G/DL
ALP SERPL-CCNC: 51 U/L (ref 39–117)
ALT SERPL W P-5'-P-CCNC: 15 U/L (ref 1–41)
ANION GAP SERPL CALCULATED.3IONS-SCNC: 10 MMOL/L
AST SERPL-CCNC: 22 U/L (ref 1–40)
BASOPHILS # BLD AUTO: 0.04 10*3/MM3 (ref 0–0.2)
BASOPHILS NFR BLD AUTO: 0.9 % (ref 0–1.5)
BILIRUB SERPL-MCNC: 1.4 MG/DL (ref 0.2–1.2)
BUN BLD-MCNC: 11 MG/DL (ref 8–23)
BUN/CREAT SERPL: 11.1 (ref 7–25)
CALCIUM SPEC-SCNC: 8.8 MG/DL (ref 8.6–10.5)
CEA SERPL-MCNC: 1.92 NG/ML
CHLORIDE SERPL-SCNC: 103 MMOL/L (ref 98–107)
CO2 SERPL-SCNC: 25 MMOL/L (ref 22–29)
CREAT BLD-MCNC: 0.99 MG/DL (ref 0.76–1.27)
DEPRECATED RDW RBC AUTO: 42.6 FL (ref 37–54)
EOSINOPHIL # BLD AUTO: 0.18 10*3/MM3 (ref 0–0.4)
EOSINOPHIL NFR BLD AUTO: 4.1 % (ref 0.3–6.2)
ERYTHROCYTE [DISTWIDTH] IN BLOOD BY AUTOMATED COUNT: 13.5 % (ref 12.3–15.4)
FERRITIN SERPL-MCNC: 158.3 NG/ML (ref 30–400)
FOLATE SERPL-MCNC: >20 NG/ML (ref 4.78–24.2)
GFR SERPL CREATININE-BSD FRML MDRD: 73 ML/MIN/1.73
GLOBULIN UR ELPH-MCNC: 2.6 GM/DL
GLUCOSE BLD-MCNC: 100 MG/DL (ref 65–99)
HCT VFR BLD AUTO: 40.7 % (ref 37.5–51)
HGB BLD-MCNC: 13.9 G/DL (ref 13–17.7)
IMM GRANULOCYTES # BLD AUTO: 0 10*3/MM3 (ref 0–0.05)
IMM GRANULOCYTES NFR BLD AUTO: 0 % (ref 0–0.5)
IRON 24H UR-MRATE: 129 MCG/DL (ref 59–158)
IRON SATN MFR SERPL: 40 % (ref 20–50)
LYMPHOCYTES # BLD AUTO: 1.13 10*3/MM3 (ref 0.7–3.1)
LYMPHOCYTES NFR BLD AUTO: 25.6 % (ref 19.6–45.3)
MCH RBC QN AUTO: 29.7 PG (ref 26.6–33)
MCHC RBC AUTO-ENTMCNC: 34.2 G/DL (ref 31.5–35.7)
MCV RBC AUTO: 87 FL (ref 79–97)
MONOCYTES # BLD AUTO: 0.39 10*3/MM3 (ref 0.1–0.9)
MONOCYTES NFR BLD AUTO: 8.8 % (ref 5–12)
NEUTROPHILS # BLD AUTO: 2.68 10*3/MM3 (ref 1.7–7)
NEUTROPHILS NFR BLD AUTO: 60.6 % (ref 42.7–76)
NRBC BLD AUTO-RTO: 0 /100 WBC (ref 0–0.2)
PLATELET # BLD AUTO: 85 10*3/MM3 (ref 140–450)
PMV BLD AUTO: 12.5 FL (ref 6–12)
POTASSIUM BLD-SCNC: 4.5 MMOL/L (ref 3.5–5.2)
PROT SERPL-MCNC: 6.8 G/DL (ref 6–8.5)
RBC # BLD AUTO: 4.68 10*6/MM3 (ref 4.14–5.8)
RBC MORPH BLD: NORMAL
SMALL PLATELETS BLD QL SMEAR: NORMAL
SODIUM BLD-SCNC: 138 MMOL/L (ref 136–145)
TIBC SERPL-MCNC: 326 MCG/DL (ref 298–536)
TRANSFERRIN SERPL-MCNC: 219 MG/DL (ref 200–360)
VIT B12 BLD-MCNC: 1114 PG/ML (ref 211–946)
WBC MORPH BLD: NORMAL
WBC NRBC COR # BLD: 4.42 10*3/MM3 (ref 3.4–10.8)

## 2019-06-19 PROCEDURE — 80053 COMPREHEN METABOLIC PANEL: CPT | Performed by: INTERNAL MEDICINE

## 2019-06-19 PROCEDURE — 82607 VITAMIN B-12: CPT | Performed by: INTERNAL MEDICINE

## 2019-06-19 PROCEDURE — 85007 BL SMEAR W/DIFF WBC COUNT: CPT | Performed by: INTERNAL MEDICINE

## 2019-06-19 PROCEDURE — 36415 COLL VENOUS BLD VENIPUNCTURE: CPT | Performed by: INTERNAL MEDICINE

## 2019-06-19 PROCEDURE — 82378 CARCINOEMBRYONIC ANTIGEN: CPT | Performed by: INTERNAL MEDICINE

## 2019-06-19 PROCEDURE — 85025 COMPLETE CBC W/AUTO DIFF WBC: CPT | Performed by: INTERNAL MEDICINE

## 2019-06-19 PROCEDURE — 84466 ASSAY OF TRANSFERRIN: CPT | Performed by: INTERNAL MEDICINE

## 2019-06-19 PROCEDURE — 82746 ASSAY OF FOLIC ACID SERUM: CPT | Performed by: INTERNAL MEDICINE

## 2019-06-19 PROCEDURE — 83540 ASSAY OF IRON: CPT | Performed by: INTERNAL MEDICINE

## 2019-06-19 PROCEDURE — 82728 ASSAY OF FERRITIN: CPT | Performed by: INTERNAL MEDICINE

## 2019-06-21 ENCOUNTER — OFFICE VISIT (OUTPATIENT)
Dept: ONCOLOGY | Facility: CLINIC | Age: 79
End: 2019-06-21

## 2019-06-21 VITALS
DIASTOLIC BLOOD PRESSURE: 80 MMHG | RESPIRATION RATE: 16 BRPM | SYSTOLIC BLOOD PRESSURE: 141 MMHG | WEIGHT: 246.6 LBS | TEMPERATURE: 98.5 F | HEIGHT: 71 IN | OXYGEN SATURATION: 95 % | HEART RATE: 64 BPM | BODY MASS INDEX: 34.52 KG/M2

## 2019-06-21 DIAGNOSIS — C18.2 MALIGNANT NEOPLASM OF ASCENDING COLON (HCC): Primary | ICD-10-CM

## 2019-06-21 PROCEDURE — G0463 HOSPITAL OUTPT CLINIC VISIT: HCPCS | Performed by: NURSE PRACTITIONER

## 2019-06-21 PROCEDURE — 99213 OFFICE O/P EST LOW 20 MIN: CPT | Performed by: NURSE PRACTITIONER

## 2019-06-21 NOTE — PROGRESS NOTES
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 Patient underwent colonoscopy on 2018 by Dr. Goins which showed infiltrating nonobstructing mass at the ileocecal valve.  Biopsy came back positive for adenocarcinoma.  Subsequently patient was referred to Dr. Shelley and underwent surgical resection on 2018. Seen by Dr. Baig for discussion of adjuvant chemotherapy for Stage II colon cancer of cecum; pt elected for observation. He is seeing Dr. Shelley later today for follow-up. Denies any recent weight loss.  Denies any stomach upset or excessive nausea and vomiting with ferrous sulfate.        PAST MEDICAL HISTORY:    Past Medical History:   Diagnosis Date   • Colon cancer (CMS/HCC)    • Hernia    • High cholesterol        SOCIAL HISTORY:    Social History     Tobacco Use   • Smoking status: Former Smoker     Packs/day: 1.00     Years: 30.00     Pack years: 30.00     Types: Cigarettes     Last attempt to quit:      Years since quittin.4   • Smokeless tobacco: Current User     Types: Chew   Substance Use Topics   • Alcohol use: No   • Drug use: No       Surgical History :  Past Surgical History:   Procedure Laterality Date   • COLON RESECTION     • COLON RESECTION N/A 2018    Procedure: Open Right Colon Resection with repair of incisional hernias;  Surgeon: Frantz Shelley MD;  Location: [facility];  Service: General   • COLON SURGERY     • COLONOSCOPY     • COLONOSCOPY N/A 2018    Procedure: COLONOSCOPY;  Surgeon:  "Austin Goins DO;  Location: [facility] ENDOSCOPY;  Service: Gastroenterology   • COLONOSCOPY N/A 4/15/2019    Procedure: COLONOSCOPY;  Surgeon: Austin Goins DO;  Location: [facility] ENDOSCOPY;  Service: Gastroenterology   • ENDOSCOPY N/A 4/4/2018    Procedure: ESOPHAGOGASTRODUODENOSCOPY;  Surgeon: Austin Goins DO;  Location: [facility] ENDOSCOPY;  Service: Gastroenterology   • FINGER SURGERY         ALLERGIES:    No Known Allergies    REVIEW OF SYSTEMS:      CONSTITUTIONAL:  No fever, chills, or night sweats.     HEENT:  No epistaxis, mouth sores, or difficulty swallowing.    RESPIRATORY:  No new shortness of breath or cough at present.    CARDIOVASCULAR:  No chest pain or palpitations.    GASTROINTESTINAL:  No abdominal pain, nausea, vomiting, or blood in the stool.    GENITOURINARY:  No dysuria or hematuria.    MUSCULOSKELETAL:  No any new back pain or arthralgias.     NEUROLOGICAL:  No tingling or numbness. No new headache or dizziness.     LYMPHATICS:  Denies any abnormal swollen and anywhere in the body.    SKIN:  Denies any new skin rash.    PHYSICAL EXAMINATION:      VITAL SIGNS:  /80   Pulse 64   Temp 98.5 °F (36.9 °C) (Temporal)   Resp 16   Ht 180.3 cm (70.98\")   Wt 112 kg (246 lb 9.6 oz)   SpO2 95%   BMI 34.41 kg/m²     GENERAL:  Not in any distress.    HEENT:  Normocephalic, Atraumatic.Mild Conjunctival pallor. No icterus. Extraocular Movements Intact. No Facial Asymmetry noted.    NECK:  No adenopathy. No JVD.    RESPIRATORY:  Fair air entry bilateral. No rhonchi or wheezing.    CARDIOVASCULAR:  S1, S2. Regular rate and rhythm. No murmur or gallop appreciated.    ABDOMEN:  Soft, obese, nontender. Bowel sounds present in all four quadrants.  No organomegaly appreciated. Hernia midline.    EXTREMITIES:  No edema.No Calf Tenderness.    NEUROLOGIC:  Alert, awake and oriented ×3.  No  Motor or sensory deficit appreciated. Cranial Nerves 2-12 grossly intact.    SKIN : No new skin lesion " identified  DIAGNOSTIC DATA:    Glucose   Date Value Ref Range Status   06/19/2019 100 (H) 65 - 99 mg/dL Final     Sodium   Date Value Ref Range Status   06/19/2019 138 136 - 145 mmol/L Final   04/05/2018 139 136 - 145 mmol/L Final     Potassium   Date Value Ref Range Status   06/19/2019 4.5 3.5 - 5.2 mmol/L Final   04/05/2018 4.1 3.3 - 5.0 mmol/L Final     CO2   Date Value Ref Range Status   06/19/2019 25.0 22.0 - 29.0 mmol/L Final     Total CO2   Date Value Ref Range Status   04/05/2018 25 20 - 31 mmol/L Final     Chloride   Date Value Ref Range Status   06/19/2019 103 98 - 107 mmol/L Final   04/05/2018 106 99 - 111 mmol/L Final     Anion Gap   Date Value Ref Range Status   06/19/2019 10.0 mmol/L Final     Creatinine   Date Value Ref Range Status   06/19/2019 0.99 0.76 - 1.27 mg/dL Final   04/05/2018 1.0 0.6 - 1.3 mg/dL Final     BUN   Date Value Ref Range Status   06/19/2019 11 8 - 23 mg/dL Final   04/05/2018 11 7.0 - 18.0 mg/dL Final     BUN/Creatinine Ratio   Date Value Ref Range Status   06/19/2019 11.1 7.0 - 25.0 Final     Calcium   Date Value Ref Range Status   06/19/2019 8.8 8.6 - 10.5 mg/dL Final   04/05/2018 8.5 8.1 - 10.2 mg/dL Final     eGFR Non  Amer   Date Value Ref Range Status   06/19/2019 73 >60 mL/min/1.73 Final     Alkaline Phosphatase   Date Value Ref Range Status   06/19/2019 51 39 - 117 U/L Final   04/05/2018 55 30 - 120 IU/L Final     Total Protein   Date Value Ref Range Status   06/19/2019 6.8 6.0 - 8.5 g/dL Final     ALT (SGPT)   Date Value Ref Range Status   06/19/2019 15 1 - 41 U/L Final   04/05/2018 10 (L) 17 - 63 IU/L Final     AST (SGOT)   Date Value Ref Range Status   06/19/2019 22 1 - 40 U/L Final   04/05/2018 17 15 - 41 IU/L Final     Total Bilirubin   Date Value Ref Range Status   06/19/2019 1.4 (H) 0.2 - 1.2 mg/dL Final   04/05/2018 0.42 0 - 1.50 mg/dL Final     Albumin   Date Value Ref Range Status   06/19/2019 4.20 3.50 - 5.20 g/dL Final   04/05/2018 3.8 3.4 - 5.0 g/dl  Final     Globulin   Date Value Ref Range Status   06/19/2019 2.6 gm/dL Final     Lab Results   Component Value Date    WBC 4.42 06/19/2019    HGB 13.9 06/19/2019    HCT 40.7 06/19/2019    MCV 87.0 06/19/2019    PLT 85 (L) 06/19/2019     Lab Results   Component Value Date    NEUTROABS 2.68 06/19/2019    IRON 129 06/19/2019    TIBC 326 06/19/2019    LABIRON 40 06/19/2019    FERRITIN 158.30 06/19/2019    CGKIZWAF21 1,114 (H) 06/19/2019    FOLATE >20.00 06/19/2019     Lab Results   Component Value Date    CEA 1.92 06/19/2019   ]  Pathology  report from April 18, 2018 showed:  Final Diagnosis   SEGMENT OF TERMINAL ILEUM AND ASCENDING COLON:  ADENOCARCINOMA, MODERATELY DIFFERENTIATED WITH MUCINOUS FEATURES OF CECUM, 6.0 CM IN GREATEST DIMENSION.  THE NEOPLASM EXTENDS TO THE SUBSEROSAL FAT.   RESECTION MARGINS, ANGIOLYMPHATIC VESSELS AND 22 REGIONAL LYMPH  NODES ARE FREE OF TUMOR.  pT3-N/O  SEE SYNOPTIC REPORT         Electronically signed by Sanjay Fairbanks MD on 4/25/2018 at 0937   Synoptic Checklist   COLON AND RECTUM: Resection, Including Transanal Disk Excision of Rectal Neoplasms   Colon Res - All Specimens        SPECIMEN   Procedure   Right hemicolectomy   TUMOR   Tumor Site   Cecum   Histologic Type   Adenocarcinoma   Histologic Grade   G2: Moderately differentiated   Tumor Size   Greatest dimension in Centimeters (cm): 6 cm   Tumor Deposits   Not identified   Tumor Extent       Tumor Extension   Tumor invades through the muscularis propria into pericolorectal tissue   Macroscopic Tumor Perforation   Not identified   Accessory Findings       Lymphovascular Invasion   Not identified   Perineural Invasion   Not identified   Type of Polyp in Which Invasive Carcinoma Arose   Villous adenoma   Treatment Effect   No known presurgical therapy   MARGINS   Margins   All margins are uninvolved by invasive carcinoma, high-grade dysplasia, intramucosal adenocarcinoma, and adenoma   Distance of Invasive Carcinoma from  Closest Margin   Specify in Millimeters (mm): 60 mm   Specify Closest Margin   Proximal   LYMPH NODES   Number of Lymph Nodes Involved   Specify number: 0   Number of Lymph Nodes Examined   Specify number: 22   PATHOLOGIC STAGE CLASSIFICATION (pTNM)   Primary Tumor (pT)   pT3: Tumor invades through the muscularis propria into pericolorectal tissues   Regional Lymph Nodes (pN)   pN0: No regional lymph node metastasis                     RADIOLOGY DATA :  CT of chest, abdomen and pelvis with contrast on December 11, 2018 was reviewed, discussed with patient, it showed:  IMPRESSION:  CONCLUSION: Benign calcified granulomata in both lung fields and   stable benign noncalcified nodule right middle lobe. No change  since prior exam. Benign cysts in both kidneys, unchanged since  prior exam. Evidence of resection of the ascending colon. Midline  abdominal wall hernia containing nonobstructed loops of bowel. CT  chest, abdomen pelvis is otherwise unremarkable. No evidence of  any recurrent or metastatic disease.           CT of abdomen and pelvis with contrast done on April 6, 2018  At [facility] showed:  Result Impression     1. The 6 x 7 mm nodule in the middle lobe is an isolated finding that was not present on the May 2014 CT scan and is not confidently identified on the current chest film . A follow-up CT of the chest in 6 months is recommended .  2. Midline ventral hernias just above the umbilicus with no evidence of strangulation but possible low-grade obstruction; there is considerable barium in the colon and this does not appear to be causing problems  3. No evidence of metastatic disease or malignancy in the abdomen or pelvis                     ASSESSMENT AND PLAN:       1.  Newly diagnosed adenocarcinoma of cecum, stage II, T3 N0 M0, 22 lymph nodes negative.  No lymphovascular invasion or perineural invasion.  No other high-risk feature on pathology.  -Result of pathology, CT of abdomen and pelvis and staging  were discussed with patient and his family.  -It was discussed with patient and his family CT of chest to show 6 mm nodule in right middle lobe which needs close follow-up in 6 months.  -As per NCCN guidelines, treatment recommendation for stage II colon cancer includes close observation versus single agent chemotherapy with Xeloda or 5-FU/leucovorin.  Patient prefers observation without chemotherapy at this point.  -In view of no high-risk features on pathology report, recommend close observation with surveillance.  -CEA level done today on December 11, 2018 was normal at 1.0.  -CT of chest, abdomen and pelvis with contrast December 11, 2018 does not show any evidence of recurrence.  Result of CT scan were discussed with patient.  -Next Surveillance CT scan will be Due in December 2019.   - Patient had surveillance colonoscopy in April 2019 with recommendation to repeat in 3 yrs.  -Will ask patient to return to clinic in 3 months with repeat CBC, CMP, CEA, and anemia workup to be done on that day        2.  Adenocarcinoma of transverse colon, stage III, T3 N1 M0 diagnosed in July 2012.  -Patient had a surgery followed by adjuvant chemotherapy consisting of FOLFOX finished in March 2013.  -We will continue with a clinical surveillance for now.     3.  Microcytic anemia: Secondary to chronic blood loss from malignancy.  -Upon l clinic visit on June 14, 2013 patient was found to have iron deficiency.  Patient was started on ferrous sulfate along with vitamin B12 and folic acid.  -Hemoglobin is improved to 13.9 today.  Iron studies also improved.  -Recommend continuing with ferrous sulfate for now.       4.  Vitamin B12 deficiency:  -Patient was found to have a borderline B12 of 320 today.  Patient is to take vitamin B12 injection every month at home that he has stopped doing for last few months.  Since vitamin B12 number is decreasing recommend doing B12 injection every 2 weeks until next clinic visit in 3  months.  -Prescription for intramuscular vitamin B12 supply has been sent to his pharmacy  on December 14, 2018.     5.  Mild thrombocytopenia.  Platelet count is 85,000 today.  We'll monitored with CBC for now.  Patient denies any active bleeding. If Patient has any worsening of thrombocytopenia and future, he will need bone marrow biopsy to rule out myelodysplasia which was discussed with patient.     6.  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  We'll monitor clinically for now without any medications.     7.  Health Maintenance: Patient does not smoke.  He just had a colonoscopy in April 2019 by Dr. Goins; per report will need repeat in 3 yrs.       This document has been signed by MARIA TERESA Nunn on June 21, 2019 12:16 PM

## 2019-06-26 ENCOUNTER — OFFICE VISIT (OUTPATIENT)
Dept: SURGERY | Facility: CLINIC | Age: 79
End: 2019-06-26

## 2019-06-26 VITALS
WEIGHT: 247 LBS | HEART RATE: 55 BPM | DIASTOLIC BLOOD PRESSURE: 80 MMHG | BODY MASS INDEX: 34.58 KG/M2 | TEMPERATURE: 98.2 F | SYSTOLIC BLOOD PRESSURE: 122 MMHG | HEIGHT: 71 IN

## 2019-06-26 DIAGNOSIS — K43.2 INCISIONAL HERNIA, WITHOUT OBSTRUCTION OR GANGRENE: Primary | ICD-10-CM

## 2019-06-26 PROCEDURE — 99213 OFFICE O/P EST LOW 20 MIN: CPT | Performed by: SURGERY

## 2019-06-26 NOTE — PROGRESS NOTES
CHIEF COMPLAINT:    Chief Complaint   Patient presents with   • Follow-up     Evaluate for incisional hernia.       HISTORY OF PRESENT ILLNESS:    Sunny Ernandez is a 79 y.o. male who underwent prior open right colon resection with primary repair of incisional hernias in April 2018.  He returns today with a new bulge in the lower abdomen which appears to be consistent with an incisional hernia.  He notes no pain in this area.  He notes he is having regular bowel function and reports no abdominal pain, no bloating, no nausea or vomiting.  He recently underwent a colonoscopy with Dr. Goins for continued surveillance.    EXAM:  Vitals:    06/26/19 1015   BP: 122/80   Pulse: 55   Temp: 98.2 °F (36.8 °C)         Abdomen soft, incision well-healed, reducible incisional hernia at lower part of incision.    ASSESSMENT:    Reducible incisional hernia, recurrent    PLAN:    The patient has a recurrent incisional hernia which is not unexpected given that he had multiple incisional hernias which were closed primarily at the time of his colon surgery.  Currently he is asymptomatic from a hernia and does not desire repair.  This hernia was also noted on a CT scan in December 2018.  Signs and symptoms of obstruction and incarceration were discussed and he understands that this would be an emergency.  Otherwise, we will see him in 6 months for routine follow-up.          This document has been electronically signed by Frantz Shelley MD on June 26, 2019 10:41 AM

## 2019-07-01 RX ORDER — FOLIC ACID 1 MG/1
TABLET ORAL
Qty: 90 TABLET | Refills: 1 | Status: SHIPPED | OUTPATIENT
Start: 2019-07-01 | End: 2020-03-14

## 2019-09-04 RX ORDER — PNV NO.95/FERROUS FUM/FOLIC AC 28MG-0.8MG
TABLET ORAL
Qty: 60 TABLET | Refills: 3 | Status: SHIPPED | OUTPATIENT
Start: 2019-09-04 | End: 2022-08-29

## 2019-09-20 ENCOUNTER — OFFICE VISIT (OUTPATIENT)
Dept: ONCOLOGY | Facility: CLINIC | Age: 79
End: 2019-09-20

## 2019-09-20 ENCOUNTER — LAB (OUTPATIENT)
Dept: ONCOLOGY | Facility: HOSPITAL | Age: 79
End: 2019-09-20

## 2019-09-20 VITALS
BODY MASS INDEX: 33.65 KG/M2 | HEIGHT: 71 IN | SYSTOLIC BLOOD PRESSURE: 142 MMHG | RESPIRATION RATE: 18 BRPM | WEIGHT: 240.4 LBS | HEART RATE: 75 BPM | OXYGEN SATURATION: 95 % | DIASTOLIC BLOOD PRESSURE: 79 MMHG | TEMPERATURE: 97.8 F

## 2019-09-20 DIAGNOSIS — C18.2 MALIGNANT NEOPLASM OF ASCENDING COLON (HCC): ICD-10-CM

## 2019-09-20 DIAGNOSIS — C18.2 MALIGNANT NEOPLASM OF ASCENDING COLON (HCC): Primary | ICD-10-CM

## 2019-09-20 LAB
ALBUMIN SERPL-MCNC: 4.2 G/DL (ref 3.5–5.2)
ALBUMIN/GLOB SERPL: 1.4 G/DL
ALP SERPL-CCNC: 50 U/L (ref 39–117)
ALT SERPL W P-5'-P-CCNC: 15 U/L (ref 1–41)
ANION GAP SERPL CALCULATED.3IONS-SCNC: 11 MMOL/L (ref 5–15)
AST SERPL-CCNC: 21 U/L (ref 1–40)
BASOPHILS # BLD AUTO: 0.04 10*3/MM3 (ref 0–0.2)
BASOPHILS NFR BLD AUTO: 0.5 % (ref 0–1.5)
BILIRUB SERPL-MCNC: 3.1 MG/DL (ref 0.2–1.2)
BUN BLD-MCNC: 17 MG/DL (ref 8–23)
BUN/CREAT SERPL: 16 (ref 7–25)
CALCIUM SPEC-SCNC: 9.1 MG/DL (ref 8.6–10.5)
CEA SERPL-MCNC: 1.56 NG/ML
CHLORIDE SERPL-SCNC: 99 MMOL/L (ref 98–107)
CO2 SERPL-SCNC: 24 MMOL/L (ref 22–29)
CREAT BLD-MCNC: 1.06 MG/DL (ref 0.76–1.27)
DEPRECATED RDW RBC AUTO: 42.1 FL (ref 37–54)
EOSINOPHIL # BLD AUTO: 0.12 10*3/MM3 (ref 0–0.4)
EOSINOPHIL NFR BLD AUTO: 1.5 % (ref 0.3–6.2)
ERYTHROCYTE [DISTWIDTH] IN BLOOD BY AUTOMATED COUNT: 13.3 % (ref 12.3–15.4)
GFR SERPL CREATININE-BSD FRML MDRD: 67 ML/MIN/1.73
GLOBULIN UR ELPH-MCNC: 3.1 GM/DL
GLUCOSE BLD-MCNC: 96 MG/DL (ref 65–99)
HCT VFR BLD AUTO: 41.3 % (ref 37.5–51)
HGB BLD-MCNC: 14.1 G/DL (ref 13–17.7)
IMM GRANULOCYTES # BLD AUTO: 0.04 10*3/MM3 (ref 0–0.05)
IMM GRANULOCYTES NFR BLD AUTO: 0.5 % (ref 0–0.5)
LYMPHOCYTES # BLD AUTO: 0.96 10*3/MM3 (ref 0.7–3.1)
LYMPHOCYTES NFR BLD AUTO: 12.4 % (ref 19.6–45.3)
MCH RBC QN AUTO: 29.4 PG (ref 26.6–33)
MCHC RBC AUTO-ENTMCNC: 34.1 G/DL (ref 31.5–35.7)
MCV RBC AUTO: 86 FL (ref 79–97)
MONOCYTES # BLD AUTO: 0.87 10*3/MM3 (ref 0.1–0.9)
MONOCYTES NFR BLD AUTO: 11.2 % (ref 5–12)
NEUTROPHILS # BLD AUTO: 5.73 10*3/MM3 (ref 1.7–7)
NEUTROPHILS NFR BLD AUTO: 73.9 % (ref 42.7–76)
NRBC BLD AUTO-RTO: 0 /100 WBC (ref 0–0.2)
PLATELET # BLD AUTO: 82 10*3/MM3 (ref 140–450)
PMV BLD AUTO: 12.3 FL (ref 6–12)
POTASSIUM BLD-SCNC: 4.1 MMOL/L (ref 3.5–5.2)
PROT SERPL-MCNC: 7.3 G/DL (ref 6–8.5)
RBC # BLD AUTO: 4.8 10*6/MM3 (ref 4.14–5.8)
SODIUM BLD-SCNC: 134 MMOL/L (ref 136–145)
WBC NRBC COR # BLD: 7.76 10*3/MM3 (ref 3.4–10.8)

## 2019-09-20 PROCEDURE — 85025 COMPLETE CBC W/AUTO DIFF WBC: CPT | Performed by: NURSE PRACTITIONER

## 2019-09-20 PROCEDURE — 82378 CARCINOEMBRYONIC ANTIGEN: CPT | Performed by: NURSE PRACTITIONER

## 2019-09-20 PROCEDURE — G0463 HOSPITAL OUTPT CLINIC VISIT: HCPCS | Performed by: NURSE PRACTITIONER

## 2019-09-20 PROCEDURE — 80053 COMPREHEN METABOLIC PANEL: CPT | Performed by: NURSE PRACTITIONER

## 2019-09-20 PROCEDURE — 99213 OFFICE O/P EST LOW 20 MIN: CPT | Performed by: NURSE PRACTITIONER

## 2019-09-20 NOTE — PROGRESS NOTES
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 Patient underwent colonoscopy on 2018 by Dr. Goins which showed infiltrating nonobstructing mass at the ileocecal valve.  Biopsy came back positive for adenocarcinoma.  Subsequently patient was referred to Dr. Shelley and underwent surgical resection on 2018. Seen by Dr. Baig for discussion of adjuvant chemotherapy for Stage II colon cancer of cecum; pt elected for observation. He is seeing Dr. Shelley later today for follow-up. Denies any recent weight loss.  Denies any stomach upset or excessive nausea and vomiting with ferrous sulfate.  Recent colonoscopy in 2018 with recommendations to repeat in 3 yrs.      PAST MEDICAL HISTORY:    Past Medical History:   Diagnosis Date   • Colon cancer (CMS/HCC)    • Hernia    • High cholesterol        SOCIAL HISTORY:    Social History     Tobacco Use   • Smoking status: Former Smoker     Packs/day: 1.00     Years: 30.00     Pack years: 30.00     Types: Cigarettes     Last attempt to quit: 1997     Years since quittin.7   • Smokeless tobacco: Current User     Types: Chew   Substance Use Topics   • Alcohol use: No   • Drug use: No       Surgical History :  Past Surgical History:   Procedure Laterality Date   • COLON RESECTION     • COLON RESECTION N/A 2018    Procedure: Open Right Colon Resection with repair of incisional hernias;  Surgeon: Frantz Shelley MD;  Location: [facility];  Service: General   • COLON SURGERY     •  "COLONOSCOPY     • COLONOSCOPY N/A 4/4/2018    Procedure: COLONOSCOPY;  Surgeon: Austin Goins DO;  Location: [facility] ENDOSCOPY;  Service: Gastroenterology   • COLONOSCOPY N/A 4/15/2019    Procedure: COLONOSCOPY;  Surgeon: Austin Goins DO;  Location: [facility] ENDOSCOPY;  Service: Gastroenterology   • ENDOSCOPY N/A 4/4/2018    Procedure: ESOPHAGOGASTRODUODENOSCOPY;  Surgeon: Austin Goins DO;  Location: [facility] ENDOSCOPY;  Service: Gastroenterology   • FINGER SURGERY         ALLERGIES:    No Known Allergies    REVIEW OF SYSTEMS:      CONSTITUTIONAL:  No fever, chills, or night sweats.     HEENT:  No epistaxis, mouth sores, or difficulty swallowing.    RESPIRATORY:  No new shortness of breath or cough at present.    CARDIOVASCULAR:  No chest pain or palpitations.    GASTROINTESTINAL:  No abdominal pain, nausea, vomiting, or blood in the stool.    GENITOURINARY:  No dysuria or hematuria.    MUSCULOSKELETAL:  No any new back pain or arthralgias.     NEUROLOGICAL:  No tingling or numbness. No new headache or dizziness.     LYMPHATICS:  Denies any abnormal swollen and anywhere in the body.    SKIN:  Denies any new skin rash.    PHYSICAL EXAMINATION:      VITAL SIGNS:  /79   Pulse 75   Temp 97.8 °F (36.6 °C) (Temporal)   Resp 18   Ht 180.3 cm (70.98\")   Wt 109 kg (240 lb 6.4 oz)   SpO2 95%   BMI 33.54 kg/m²     GENERAL:  Not in any distress.    HEENT:  Normocephalic, Atraumatic.Mild Conjunctival pallor. No icterus. Extraocular Movements Intact. No Facial Asymmetry noted.    NECK:  No adenopathy. No JVD.    RESPIRATORY:  Fair air entry bilateral. No rhonchi or wheezing.    CARDIOVASCULAR:  S1, S2. Regular rate and rhythm. No murmur or gallop appreciated.    ABDOMEN:  Soft, obese, nontender. Bowel sounds present in all four quadrants.  No organomegaly appreciated.    EXTREMITIES:  No edema.No Calf Tenderness.    NEUROLOGIC:  Alert, awake and oriented ×3.  No  Motor or sensory deficit appreciated. " Cranial Nerves 2-12 grossly intact.    SKIN : No new skin lesion identified  DIAGNOSTIC DATA:    Glucose   Date Value Ref Range Status   09/20/2019 96 65 - 99 mg/dL Final     Sodium   Date Value Ref Range Status   09/20/2019 134 (L) 136 - 145 mmol/L Final   04/05/2018 139 136 - 145 mmol/L Final     Potassium   Date Value Ref Range Status   09/20/2019 4.1 3.5 - 5.2 mmol/L Final   04/05/2018 4.1 3.3 - 5.0 mmol/L Final     CO2   Date Value Ref Range Status   09/20/2019 24.0 22.0 - 29.0 mmol/L Final     Total CO2   Date Value Ref Range Status   04/05/2018 25 20 - 31 mmol/L Final     Chloride   Date Value Ref Range Status   09/20/2019 99 98 - 107 mmol/L Final   04/05/2018 106 99 - 111 mmol/L Final     Anion Gap   Date Value Ref Range Status   09/20/2019 11.0 5.0 - 15.0 mmol/L Final     Creatinine   Date Value Ref Range Status   09/20/2019 1.06 0.76 - 1.27 mg/dL Final   04/05/2018 1.0 0.6 - 1.3 mg/dL Final     BUN   Date Value Ref Range Status   09/20/2019 17 8 - 23 mg/dL Final   04/05/2018 11 7.0 - 18.0 mg/dL Final     BUN/Creatinine Ratio   Date Value Ref Range Status   09/20/2019 16.0 7.0 - 25.0 Final     Calcium   Date Value Ref Range Status   09/20/2019 9.1 8.6 - 10.5 mg/dL Final   04/05/2018 8.5 8.1 - 10.2 mg/dL Final     eGFR Non  Amer   Date Value Ref Range Status   09/20/2019 67 >60 mL/min/1.73 Final     Alkaline Phosphatase   Date Value Ref Range Status   09/20/2019 50 39 - 117 U/L Final   04/05/2018 55 30 - 120 IU/L Final     Total Protein   Date Value Ref Range Status   09/20/2019 7.3 6.0 - 8.5 g/dL Final     ALT (SGPT)   Date Value Ref Range Status   09/20/2019 15 1 - 41 U/L Final   04/05/2018 10 (L) 17 - 63 IU/L Final     AST (SGOT)   Date Value Ref Range Status   09/20/2019 21 1 - 40 U/L Final   04/05/2018 17 15 - 41 IU/L Final     Total Bilirubin   Date Value Ref Range Status   09/20/2019 3.1 (H) 0.2 - 1.2 mg/dL Final   04/05/2018 0.42 0 - 1.50 mg/dL Final     Albumin   Date Value Ref Range Status    09/20/2019 4.20 3.50 - 5.20 g/dL Final   04/05/2018 3.8 3.4 - 5.0 g/dl Final     Globulin   Date Value Ref Range Status   09/20/2019 3.1 gm/dL Final     Lab Results   Component Value Date    WBC 7.76 09/20/2019    HGB 14.1 09/20/2019    HCT 41.3 09/20/2019    MCV 86.0 09/20/2019    PLT 82 (L) 09/20/2019     Lab Results   Component Value Date    NEUTROABS 5.73 09/20/2019    IRON 129 06/19/2019    TIBC 326 06/19/2019    LABIRON 40 06/19/2019    FERRITIN 158.30 06/19/2019    HNQKSWUT86 1,114 (H) 06/19/2019    FOLATE >20.00 06/19/2019     Lab Results   Component Value Date    CEA 1.92 06/19/2019   ]  Pathology  report from April 18, 2018 showed:      Final Diagnosis    SEGMENT OF TERMINAL ILEUM AND ASCENDING COLON:  ADENOCARCINOMA, MODERATELY DIFFERENTIATED WITH MUCINOUS FEATURES OF CECUM, 6.0 CM IN GREATEST DIMENSION.  THE NEOPLASM EXTENDS TO THE SUBSEROSAL FAT.   RESECTION MARGINS, ANGIOLYMPHATIC VESSELS AND 22 REGIONAL LYMPH  NODES ARE FREE OF TUMOR.  pT3-N/O  SEE SYNOPTIC REPORT          Electronically signed by Sanjay Fairbanks MD on 4/25/2018 at 0937    Synoptic Checklist    COLON AND RECTUM: Resection, Including Transanal Disk Excision of Rectal Neoplasms   Colon Res - All Specimens           SPECIMEN   Procedure   Right hemicolectomy   TUMOR   Tumor Site   Cecum   Histologic Type   Adenocarcinoma   Histologic Grade   G2: Moderately differentiated   Tumor Size   Greatest dimension in Centimeters (cm): 6 cm   Tumor Deposits   Not identified   Tumor Extent       Tumor Extension   Tumor invades through the muscularis propria into pericolorectal tissue   Macroscopic Tumor Perforation   Not identified   Accessory Findings       Lymphovascular Invasion   Not identified   Perineural Invasion   Not identified   Type of Polyp in Which Invasive Carcinoma Arose   Villous adenoma   Treatment Effect   No known presurgical therapy   MARGINS   Margins   All margins are uninvolved by invasive carcinoma, high-grade dysplasia,  intramucosal adenocarcinoma, and adenoma   Distance of Invasive Carcinoma from Closest Margin   Specify in Millimeters (mm): 60 mm   Specify Closest Margin   Proximal   LYMPH NODES   Number of Lymph Nodes Involved   Specify number: 0   Number of Lymph Nodes Examined   Specify number: 22   PATHOLOGIC STAGE CLASSIFICATION (pTNM)   Primary Tumor (pT)   pT3: Tumor invades through the muscularis propria into pericolorectal tissues   Regional Lymph Nodes (pN)   pN0: No regional lymph node metastasis                     RADIOLOGY DATA :  CT of chest, abdomen and pelvis with contrast on December 11, 2018 was reviewed, discussed with patient, it showed:  IMPRESSION:  CONCLUSION: Benign calcified granulomata in both lung fields and   stable benign noncalcified nodule right middle lobe. No change  since prior exam. Benign cysts in both kidneys, unchanged since  prior exam. Evidence of resection of the ascending colon. Midline  abdominal wall hernia containing nonobstructed loops of bowel. CT  chest, abdomen pelvis is otherwise unremarkable. No evidence of  any recurrent or metastatic disease.           CT of abdomen and pelvis with contrast done on April 6, 2018  At [facility] showed:  Result Impression     1. The 6 x 7 mm nodule in the middle lobe is an isolated finding that was not present on the May 2014 CT scan and is not confidently identified on the current chest film . A follow-up CT of the chest in 6 months is recommended .  2. Midline ventral hernias just above the umbilicus with no evidence of strangulation but possible low-grade obstruction; there is considerable barium in the colon and this does not appear to be causing problems  3. No evidence of metastatic disease or malignancy in the abdomen or pelvis                     ASSESSMENT AND PLAN:       1.  Newly diagnosed adenocarcinoma of cecum, stage II, T3 N0 M0, 22 lymph nodes negative.  No lymphovascular invasion or perineural invasion.  No other high-risk  feature on pathology.  -Result of pathology, CT of abdomen and pelvis and staging were discussed with patient and his family.  -It was discussed with patient and his family CT of chest to show 6 mm nodule in right middle lobe which needs close follow-up in 6 months.  -As per NCCN guidelines, treatment recommendation for stage II colon cancer includes close observation versus single agent chemotherapy with Xeloda or 5-FU/leucovorin.  Patient prefers observation without chemotherapy at this point.  -In view of no high-risk features on pathology report, recommend close observation with surveillance.  -CEA level done today on December 11, 2018 was normal at 1.0.  -CT of chest, abdomen and pelvis with contrast December 11, 2018 does not show any evidence of recurrence.  Result of CT scan were discussed with patient.  -Next Surveillance CT scan will be Due in December 2019.Orders placed today   - Patient had surveillance colonoscopy in April 2019 with recommendation to repeat in 3 yrs.  -Will ask patient to return to clinic in 3 months with repeat CBC, CMP, CEA, and anemia workup to be done on that day        2.  Adenocarcinoma of transverse colon, stage III, T3 N1 M0 diagnosed in July 2012.  -Patient had a surgery followed by adjuvant chemotherapy consisting of FOLFOX finished in March 2013.  -We will continue with a clinical surveillance for now.     3.  Microcytic anemia: Secondary to chronic blood loss from malignancy.  -Upon l clinic visit on June 14, 2013 patient was found to have iron deficiency.  Patient was started on ferrous sulfate along with vitamin B12 and folic acid.  -Hemoglobin is improved to 14.1 today.  Iron studies also improved.  -Recommend continuing with ferrous sulfate for now.       4.  Vitamin B12 deficiency:  -Patient was found to have a borderline B12 of 320 today.  Patient is to take vitamin B12 injection every month at home that he has stopped doing for last few months.  Since vitamin B12  number is decreasing recommend doing B12 injection every 2 weeks until next clinic visit in 3 months.       5.  Mild thrombocytopenia.  Platelet count is 85,000 today.  We'll monitored with CBC for now.  Patient denies any active bleeding. If Patient has any worsening of thrombocytopenia and future, he will need bone marrow biopsy to rule out myelodysplasia which was discussed with patient.     6.  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  We'll monitor clinically for now without any medications.     7.  Health Maintenance: Patient does not smoke.  He just had a colonoscopy in April 2019 by Dr. Goins; per report will need repeat in 3 yrs.          This document has been signed by MARIA TERESA Nunn on September 20, 2019 1:50 PM

## 2019-09-20 NOTE — PATIENT INSTRUCTIONS
Patient Instructions for CT Scan    · Your CT scan is being done without any oral contrast.  Your CT scan may be done with IV contrast, if you have an allergy to iodine--please tell your nurse.    · Do not eat or drink 4 hours prior to scan.     · You may take your medications with sips of water, except DO NOT take your diabetic pill the morning of the test.    Arrive at the Outpatient Surgery entrance at [facility] 20 minutes prior to appointment time.    You will receive a phone call with an appointment for your CT scan.  Please call our office, if someone does not contact you with 3 days.    Cici Quigley RN  September 20, 2019  10:11 AM

## 2019-12-18 ENCOUNTER — HOSPITAL ENCOUNTER (OUTPATIENT)
Dept: CT IMAGING | Facility: HOSPITAL | Age: 79
Discharge: HOME | End: 2019-12-18
Admitting: NURSE PRACTITIONER

## 2019-12-18 ENCOUNTER — LAB (OUTPATIENT)
Dept: LAB | Facility: HOSPITAL | Age: 79
End: 2019-12-18

## 2019-12-18 DIAGNOSIS — C18.2 MALIGNANT NEOPLASM OF ASCENDING COLON (HCC): ICD-10-CM

## 2019-12-18 LAB
ALBUMIN SERPL-MCNC: 4 G/DL (ref 3.5–5.2)
ALBUMIN/GLOB SERPL: 1.3 G/DL
ALP SERPL-CCNC: 49 U/L (ref 39–117)
ALT SERPL W P-5'-P-CCNC: 19 U/L (ref 1–41)
ANION GAP SERPL CALCULATED.3IONS-SCNC: 10 MMOL/L (ref 5–15)
AST SERPL-CCNC: 23 U/L (ref 1–40)
BASOPHILS # BLD AUTO: 0.06 10*3/MM3 (ref 0–0.2)
BASOPHILS NFR BLD AUTO: 1.5 % (ref 0–1.5)
BILIRUB SERPL-MCNC: 1.1 MG/DL (ref 0.2–1.2)
BUN BLD-MCNC: 21 MG/DL (ref 8–23)
BUN/CREAT SERPL: 25 (ref 7–25)
CALCIUM SPEC-SCNC: 8.9 MG/DL (ref 8.6–10.5)
CHLORIDE SERPL-SCNC: 104 MMOL/L (ref 98–107)
CO2 SERPL-SCNC: 24 MMOL/L (ref 22–29)
CREAT BLD-MCNC: 0.84 MG/DL (ref 0.76–1.27)
DEPRECATED RDW RBC AUTO: 44.1 FL (ref 37–54)
EOSINOPHIL # BLD AUTO: 0.21 10*3/MM3 (ref 0–0.4)
EOSINOPHIL NFR BLD AUTO: 5.3 % (ref 0.3–6.2)
ERYTHROCYTE [DISTWIDTH] IN BLOOD BY AUTOMATED COUNT: 13.7 % (ref 12.3–15.4)
FERRITIN SERPL-MCNC: 335.7 NG/ML (ref 30–400)
FOLATE SERPL-MCNC: >20 NG/ML (ref 4.78–24.2)
GFR SERPL CREATININE-BSD FRML MDRD: 88 ML/MIN/1.73
GLOBULIN UR ELPH-MCNC: 3 GM/DL
GLUCOSE BLD-MCNC: 89 MG/DL (ref 65–99)
HCT VFR BLD AUTO: 41 % (ref 37.5–51)
HGB BLD-MCNC: 14 G/DL (ref 13–17.7)
IMM GRANULOCYTES # BLD AUTO: 0.01 10*3/MM3 (ref 0–0.05)
IMM GRANULOCYTES NFR BLD AUTO: 0.3 % (ref 0–0.5)
IRON 24H UR-MRATE: 102 MCG/DL (ref 59–158)
IRON SATN MFR SERPL: 37 % (ref 20–50)
LYMPHOCYTES # BLD AUTO: 0.89 10*3/MM3 (ref 0.7–3.1)
LYMPHOCYTES NFR BLD AUTO: 22.3 % (ref 19.6–45.3)
MCH RBC QN AUTO: 29.7 PG (ref 26.6–33)
MCHC RBC AUTO-ENTMCNC: 34.1 G/DL (ref 31.5–35.7)
MCV RBC AUTO: 87 FL (ref 79–97)
MONOCYTES # BLD AUTO: 0.36 10*3/MM3 (ref 0.1–0.9)
MONOCYTES NFR BLD AUTO: 9 % (ref 5–12)
NEUTROPHILS # BLD AUTO: 2.46 10*3/MM3 (ref 1.7–7)
NEUTROPHILS NFR BLD AUTO: 61.6 % (ref 42.7–76)
NRBC BLD AUTO-RTO: 0 /100 WBC (ref 0–0.2)
PLATELET # BLD AUTO: 88 10*3/MM3 (ref 140–450)
PMV BLD AUTO: 13.7 FL (ref 6–12)
POTASSIUM BLD-SCNC: 4.1 MMOL/L (ref 3.5–5.2)
PROT SERPL-MCNC: 7 G/DL (ref 6–8.5)
RBC # BLD AUTO: 4.71 10*6/MM3 (ref 4.14–5.8)
SODIUM BLD-SCNC: 138 MMOL/L (ref 136–145)
TIBC SERPL-MCNC: 279 MCG/DL (ref 298–536)
TRANSFERRIN SERPL-MCNC: 187 MG/DL (ref 200–360)
VIT B12 BLD-MCNC: 1760 PG/ML (ref 211–946)
WBC NRBC COR # BLD: 3.99 10*3/MM3 (ref 3.4–10.8)

## 2019-12-18 PROCEDURE — 80053 COMPREHEN METABOLIC PANEL: CPT

## 2019-12-18 PROCEDURE — 82607 VITAMIN B-12: CPT

## 2019-12-18 PROCEDURE — 74177 CT ABD & PELVIS W/CONTRAST: CPT

## 2019-12-18 PROCEDURE — 71260 CT THORAX DX C+: CPT

## 2019-12-18 PROCEDURE — 85025 COMPLETE CBC W/AUTO DIFF WBC: CPT

## 2019-12-18 PROCEDURE — 82746 ASSAY OF FOLIC ACID SERUM: CPT

## 2019-12-18 PROCEDURE — 84466 ASSAY OF TRANSFERRIN: CPT

## 2019-12-18 PROCEDURE — 83540 ASSAY OF IRON: CPT

## 2019-12-18 PROCEDURE — 36415 COLL VENOUS BLD VENIPUNCTURE: CPT

## 2019-12-18 PROCEDURE — 82728 ASSAY OF FERRITIN: CPT

## 2019-12-18 PROCEDURE — 25010000002 IOPAMIDOL 61 % SOLUTION: Performed by: NURSE PRACTITIONER

## 2019-12-18 RX ADMIN — IOPAMIDOL 90 ML: 612 INJECTION, SOLUTION INTRAVENOUS at 09:35

## 2019-12-20 ENCOUNTER — OFFICE VISIT (OUTPATIENT)
Dept: ONCOLOGY | Facility: CLINIC | Age: 79
End: 2019-12-20

## 2019-12-20 ENCOUNTER — APPOINTMENT (OUTPATIENT)
Dept: ONCOLOGY | Facility: HOSPITAL | Age: 79
End: 2019-12-20

## 2019-12-20 VITALS
RESPIRATION RATE: 16 BRPM | SYSTOLIC BLOOD PRESSURE: 123 MMHG | TEMPERATURE: 97.3 F | HEIGHT: 71 IN | BODY MASS INDEX: 29.79 KG/M2 | WEIGHT: 212.8 LBS | HEART RATE: 87 BPM | DIASTOLIC BLOOD PRESSURE: 77 MMHG

## 2019-12-20 DIAGNOSIS — T45.1X5A NEUROPATHY DUE TO CHEMOTHERAPEUTIC DRUG (HCC): ICD-10-CM

## 2019-12-20 DIAGNOSIS — D69.6 THROMBOCYTOPENIA (HCC): ICD-10-CM

## 2019-12-20 DIAGNOSIS — D64.9 ANEMIA, UNSPECIFIED TYPE: ICD-10-CM

## 2019-12-20 DIAGNOSIS — E53.8 B12 DEFICIENCY: ICD-10-CM

## 2019-12-20 DIAGNOSIS — G62.0 NEUROPATHY DUE TO CHEMOTHERAPEUTIC DRUG (HCC): ICD-10-CM

## 2019-12-20 DIAGNOSIS — C18.2 MALIGNANT NEOPLASM OF ASCENDING COLON (HCC): Primary | ICD-10-CM

## 2019-12-20 PROCEDURE — G0463 HOSPITAL OUTPT CLINIC VISIT: HCPCS | Performed by: INTERNAL MEDICINE

## 2019-12-20 PROCEDURE — G8731 PAIN NEG NO PLAN: HCPCS | Performed by: INTERNAL MEDICINE

## 2019-12-20 PROCEDURE — 1123F ACP DISCUSS/DSCN MKR DOCD: CPT | Performed by: INTERNAL MEDICINE

## 2019-12-20 PROCEDURE — G9903 PT SCRN TBCO ID AS NON USER: HCPCS | Performed by: INTERNAL MEDICINE

## 2019-12-20 PROCEDURE — 99214 OFFICE O/P EST MOD 30 MIN: CPT | Performed by: INTERNAL MEDICINE

## 2019-12-20 NOTE — PROGRESS NOTES
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 Patient underwent colonoscopy on 2018 by Dr. Goins which showed infiltrating nonobstructing mass at the ileocecal valve.  Biopsy came back positive for adenocarcinoma.  Subsequently patient was referred to Dr. Shelley and underwent surgical resection on 2018.    Patient is here for follow-up appointment today.  Had a surveillance CT of chest, abdomen and pelvis without contrast done on 2019,.  Patient is here to discuss the results and further recommendation.  States his fatigue is improved.    Denies any recent weight loss.  Denies any stomach upset or excessive nausea and vomiting with ferrous sulfate.       PAST MEDICAL HISTORY:    Past Medical History:   Diagnosis Date   • Colon cancer (CMS/HCC)    • Hernia    • High cholesterol        SOCIAL HISTORY:    Social History     Tobacco Use   • Smoking status: Former Smoker     Packs/day: 1.00     Years: 30.00     Pack years: 30.00     Types: Cigarettes     Last attempt to quit: 1997     Years since quittin.9   • Smokeless tobacco: Current User     Types: Chew   Substance Use Topics   • Alcohol use: No   • Drug use: No       Surgical History :  Past Surgical History:   Procedure Laterality Date   • COLON RESECTION     • COLON RESECTION N/A 2018    Procedure: Open Right Colon Resection with repair of incisional hernias;   "Surgeon: Frantz Shelley MD;  Location: [facility] OR;  Service: General   • COLON SURGERY     • COLONOSCOPY     • COLONOSCOPY N/A 4/4/2018    Procedure: COLONOSCOPY;  Surgeon: Austin Goins DO;  Location: [facility] ENDOSCOPY;  Service: Gastroenterology   • COLONOSCOPY N/A 4/15/2019    Procedure: COLONOSCOPY;  Surgeon: Austin Goins DO;  Location: [facility] ENDOSCOPY;  Service: Gastroenterology   • ENDOSCOPY N/A 4/4/2018    Procedure: ESOPHAGOGASTRODUODENOSCOPY;  Surgeon: Austin Goins DO;  Location: [facility] ENDOSCOPY;  Service: Gastroenterology   • FINGER SURGERY         ALLERGIES:    No Known Allergies      FAMILY HISTORY:  Family History   Problem Relation Age of Onset   • Lung cancer Mother            REVIEW OF SYSTEMS:      CONSTITUTIONAL: Complains of fatigue.  Admits to intentional weight loss of 28 pounds since last clinic visit.  No fever, chills, or night sweats.     HEENT:  No epistaxis, mouth sores, or difficulty swallowing.    RESPIRATORY:  No new shortness of breath or cough at present.    CARDIOVASCULAR:  No chest pain or palpitations.    GASTROINTESTINAL:   No abdominal pain, nausea, vomiting or blood in stool.    GENITOURINARY:  No dysuria or hematuria.    MUSCULOSKELETAL:  Positive for chronic arthritic pain    NEUROLOGICAL:  Complains of chronic tingling and numbness affecting bilateral hand and bilateral feet since chemotherapy in 2012, denies any recent worsening. No new headache or dizziness.     LYMPHATICS:  Denies any abnormal swollen and anywhere in the body.    SKIN:  Denies any new skin rash.        PHYSICAL EXAMINATION:      VITAL SIGNS:  Temp 97.3 °F (36.3 °C) (Temporal)   Resp 16   Ht 180.3 cm (70.98\")   Wt 96.5 kg (212 lb 12.8 oz)   BMI 29.69 kg/m²       12/20/19  1040   Weight: 96.5 kg (212 lb 12.8 oz)       ECOG  performance status: 2      GENERAL:  Not in any distress.    HEENT:  Normocephalic, Atraumatic.Mild Conjunctival pallor. No icterus. Extraocular Movements " Intact. No Facial Asymmetry noted.    NECK:  No adenopathy. No JVD.  Trachea in midline.    RESPIRATORY:  Fair air entry bilateral. No rhonchi or wheezing.    CARDIOVASCULAR:  S1, S2. Regular rate and rhythm. No murmur or gallop appreciated.    ABDOMEN:  Soft, obese, nontender. Bowel sounds present in all four quadrants.  No hepatosplenomegaly appreciated.  Well-healed surgical scar present in midline.    MUSCULOSKELETAL:  No edema.No Calf Tenderness.Decreased range of motion.    NEUROLOGIC:  Alert, awake and oriented ×3.  No  Motor deficit appreciated. Cranial Nerves 2-12 grossly intact.    SKIN : No new skin lesion identified    LYMPHATICS: No new enlarged lymph nodes in neck or supraclavicular area.    PSYCHIATRY: Normal affect.  Normal judgment.  Makes good eye contact.        DIAGNOSTIC DATA:    Glucose   Date Value Ref Range Status   12/18/2019 89 65 - 99 mg/dL Final     Sodium   Date Value Ref Range Status   12/18/2019 138 136 - 145 mmol/L Final   04/05/2018 139 136 - 145 mmol/L Final     Potassium   Date Value Ref Range Status   12/18/2019 4.1 3.5 - 5.2 mmol/L Final   04/05/2018 4.1 3.3 - 5.0 mmol/L Final     CO2   Date Value Ref Range Status   12/18/2019 24.0 22.0 - 29.0 mmol/L Final     Total CO2   Date Value Ref Range Status   04/05/2018 25 20 - 31 mmol/L Final     Chloride   Date Value Ref Range Status   12/18/2019 104 98 - 107 mmol/L Final   04/05/2018 106 99 - 111 mmol/L Final     Anion Gap   Date Value Ref Range Status   12/18/2019 10.0 5.0 - 15.0 mmol/L Final     Creatinine   Date Value Ref Range Status   12/18/2019 0.84 0.76 - 1.27 mg/dL Final   04/05/2018 1.0 0.6 - 1.3 mg/dL Final     BUN   Date Value Ref Range Status   12/18/2019 21 8 - 23 mg/dL Final   04/05/2018 11 7.0 - 18.0 mg/dL Final     BUN/Creatinine Ratio   Date Value Ref Range Status   12/18/2019 25.0 7.0 - 25.0 Final     Calcium   Date Value Ref Range Status   12/18/2019 8.9 8.6 - 10.5 mg/dL Final   04/05/2018 8.5 8.1 - 10.2 mg/dL Final      eGFR Non  Amer   Date Value Ref Range Status   12/18/2019 88 >60 mL/min/1.73 Final     Alkaline Phosphatase   Date Value Ref Range Status   12/18/2019 49 39 - 117 U/L Final   04/05/2018 55 30 - 120 IU/L Final     Total Protein   Date Value Ref Range Status   12/18/2019 7.0 6.0 - 8.5 g/dL Final     ALT (SGPT)   Date Value Ref Range Status   12/18/2019 19 1 - 41 U/L Final   04/05/2018 10 (L) 17 - 63 IU/L Final     AST (SGOT)   Date Value Ref Range Status   12/18/2019 23 1 - 40 U/L Final   04/05/2018 17 15 - 41 IU/L Final     Total Bilirubin   Date Value Ref Range Status   12/18/2019 1.1 0.2 - 1.2 mg/dL Final   04/05/2018 0.42 0 - 1.50 mg/dL Final     Albumin   Date Value Ref Range Status   12/18/2019 4.00 3.50 - 5.20 g/dL Final   04/05/2018 3.8 3.4 - 5.0 g/dl Final     Globulin   Date Value Ref Range Status   12/18/2019 3.0 gm/dL Final     Lab Results   Component Value Date    WBC 3.99 12/18/2019    HGB 14.0 12/18/2019    HCT 41.0 12/18/2019    MCV 87.0 12/18/2019    PLT 88 (L) 12/18/2019     Lab Results   Component Value Date    NEUTROABS 2.46 12/18/2019    IRON 102 12/18/2019    TIBC 279 (L) 12/18/2019    LABIRON 37 12/18/2019    FERRITIN 335.70 12/18/2019    NHOCCFBK34 1,760 (H) 12/18/2019    FOLATE >20.00 12/18/2019     Lab Results   Component Value Date    CEA 1.56 09/20/2019           PATHOLOGY:  Pathology  report from April 18, 2018 showed:  Final Diagnosis   SEGMENT OF TERMINAL ILEUM AND ASCENDING COLON:  ADENOCARCINOMA, MODERATELY DIFFERENTIATED WITH MUCINOUS FEATURES OF CECUM, 6.0 CM IN GREATEST DIMENSION.  THE NEOPLASM EXTENDS TO THE SUBSEROSAL FAT.   RESECTION MARGINS, ANGIOLYMPHATIC VESSELS AND 22 REGIONAL LYMPH  NODES ARE FREE OF TUMOR.  pT3-N/O  SEE SYNOPTIC REPORT         Electronically signed by Sanjay Fairbanks MD on 4/25/2018 at 0937   Synoptic Checklist   COLON AND RECTUM: Resection, Including Transanal Disk Excision of Rectal Neoplasms   Colon Res - All Specimens   SPECIMEN   Procedure   Right hemicolectomy   TUMOR   Tumor Site  Cecum   Histologic Type  Adenocarcinoma   Histologic Grade  G2: Moderately differentiated   Tumor Size  Greatest dimension in Centimeters (cm): 6 cm   Tumor Deposits  Not identified   Tumor Extent     Tumor Extension  Tumor invades through the muscularis propria into pericolorectal tissue   Macroscopic Tumor Perforation  Not identified   Accessory Findings     Lymphovascular Invasion  Not identified   Perineural Invasion  Not identified   Type of Polyp in Which Invasive Carcinoma Arose  Villous adenoma   Treatment Effect  No known presurgical therapy   MARGINS   Margins  All margins are uninvolved by invasive carcinoma, high-grade dysplasia, intramucosal adenocarcinoma, and adenoma   Distance of Invasive Carcinoma from Closest Margin  Specify in Millimeters (mm): 60 mm   Specify Closest Margin  Proximal   LYMPH NODES   Number of Lymph Nodes Involved  Specify number: 0   Number of Lymph Nodes Examined  Specify number: 22   PATHOLOGIC STAGE CLASSIFICATION (pTNM)   Primary Tumor (pT)  pT3: Tumor invades through the muscularis propria into pericolorectal tissues   Regional Lymph Nodes (pN)  pN0: No regional lymph node metastasis                RADIOLOGY DATA :  CT of chest, abdomen and pelvis with contrast done on December 18, 2019 was reviewed, discussed with patient, it showed:  IMPRESSION:  6.5 mm noncalcified pulmonary nodule within the anterior right  middle lobe. No enlargement or morphological change. No  development of pulmonary mass or suspicious pulmonary nodule.     No acute pulmonary or pleural finding. Evidence of old  granulomatous disease.     No CT evidence of acute abdominal or pelvic finding.     Postsurgical changes of the right colon. No finding of metastatic  disease or adenopathy.     Diverticulosis without acute diverticulitis.     Redemonstration of large ventral abdominal wall hernia containing  mesenteric fat and small bowel without  obstruction.            CT of abdomen and pelvis with contrast done on April 6, 2018  At [facility] showed:  Result Impression     1. The 6 x 7 mm nodule in the middle lobe is an isolated finding that was not present on the May 2014 CT scan and is not confidently identified on the current chest film . A follow-up CT of the chest in 6 months is recommended .  2. Midline ventral hernias just above the umbilicus with no evidence of strangulation but possible low-grade obstruction; there is considerable barium in the colon and this does not appear to be causing problems  3. No evidence of metastatic disease or malignancy in the abdomen or pelvis               ASSESSMENT AND PLAN:      1.  adenocarcinoma of cecum, stage II, T3 N0 M0, 22 lymph nodes negative.  No lymphovascular invasion or perineural invasion.  No other high-risk feature on pathology.  -Result of pathology, CT of abdomen and pelvis and staging were discussed with patient and his family.  -It was discussed with patient and his family CT of chest to show 6 mm nodule in right middle lobe which needs close follow-up in 6 months.  -As per NCCN guidelines, treatment recommendation for stage II colon cancer includes close observation versus single agent chemotherapy with Xeloda or 5-FU/leucovorin.  Patient prefers observation without chemotherapy at this point.  -In view of no high-risk features on pathology report, recommend close observation with surveillance.  -CEA level done today on September 20,2019 was normal at 1.56.  -CT of chest, abdomen and pelvis with contrast December 18, 2019 does not show any evidence of recurrence.  Result of CT scan were discussed with patient.  -Had a surveillance colonoscopy done in April 2019 by Dr. Goins.  Next surveillance colonoscopy will be due in April 2022.  - Next surveillance CT of chest, abdomen and pelvis with contrast will be due in December 2020.  --Will ask patient to return to clinic in 3 months with repeat  CBC, CMP, CEA, and anemia workup to be done on that day      2.  Adenocarcinoma of transverse colon, stage III, T3 N1 M0 diagnosed in July 2012.  -Patient had a surgery followed by adjuvant chemotherapy consisting of FOLFOX finished in March 2013.  -We will continue with a clinical surveillance for now.    3.  Microcytic anemia: Secondary to chronic blood loss from malignancy.  -Upon l clinic visit on June 14, 2013 patient was found to have iron deficiency.  Patient was started on ferrous sulfate along with vitamin B12 and folic acid.  -Hemoglobin is improved to 14.0 today.  Iron studies also improved.  -We will discontinue ferrous sulfate and folic acid supplement from today on December 20, 2019.      4.  Vitamin B12 deficiency:  -Patient was found to have a borderline B12 of 320 today.  Patient is to take vitamin B12 injection every month at home that he has stopped doing for last few months.  Since vitamin B12 number is decreasing recommend doing B12 injection every 2 weeks until next clinic visit in 3 months.  -B12 number is greater than 1500.  Recommend monthly B12 injection at home.    5.  Mild thrombocytopenia.  Platelet count is 88,000 today.  We'll monitored with CBC for now.  Patient denies any active bleeding. If Patient has any worsening of thrombocytopenia and future, he will need bone marrow biopsy to rule out myelodysplasia which was discussed with patient.    6.  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  We'll monitor clinically for now without any medications.    7.  Health Maintenance: Patient does not smoke.  He just had a colonoscopy in April 2019 by Dr. Goins.  He will need surveillance colonoscopy in around April 2022.    8.  Advanced care planning: For now patient remains full code and is able to make his decisions.  Patient has health care surrogate mentioned on chart.    9. BMI: Patient's Body mass index is 29.69 kg/m². BMI is higher than reference range.   Patient was notified about her elevated BMI.    10.  Pain assessment:  -Patient denies any pain today.      Dany Baig MD  12/20/2019  10:48 AM        EMR Dragon/Transcription disclaimer:   Much of this encounter note is an electronic transcription/translation of spoken language to printed text. The electronic translation of spoken language may permit erroneous, or at times, nonsensical words or phrases to be inadvertently transcribed; Although I have reviewed the note for such errors, some may still exist.

## 2019-12-30 ENCOUNTER — OFFICE VISIT (OUTPATIENT)
Dept: SURGERY | Facility: CLINIC | Age: 79
End: 2019-12-30

## 2019-12-30 VITALS
SYSTOLIC BLOOD PRESSURE: 116 MMHG | HEART RATE: 64 BPM | TEMPERATURE: 97 F | WEIGHT: 212 LBS | HEIGHT: 71 IN | DIASTOLIC BLOOD PRESSURE: 74 MMHG | BODY MASS INDEX: 29.68 KG/M2

## 2019-12-30 DIAGNOSIS — K43.2 INCISIONAL HERNIA, WITHOUT OBSTRUCTION OR GANGRENE: Primary | ICD-10-CM

## 2019-12-30 PROCEDURE — 99212 OFFICE O/P EST SF 10 MIN: CPT | Performed by: SURGERY

## 2019-12-30 NOTE — PROGRESS NOTES
CHIEF COMPLAINT:    Chief Complaint   Patient presents with   • Follow-up     6 Mon. follow-up incisional hernia       HISTORY OF PRESENT ILLNESS:    Sunny Ernandez is a 79 y.o. male who underwent prior open right colon resection for cancer.  He is still followed by the cancer center.  He has no evidence of recurrent disease.  He has a known incisional hernia.  We previously discussed repairing the hernia but as it is asymptomatic he has not desired repair.    He continues to have regular bowel function.  He reports no nausea or vomiting.  He reports no pain related to the hernia.    EXAM:  Vitals:    12/30/19 0945   BP: 116/74   Pulse: 64   Temp: 97 °F (36.1 °C)         Abdomen soft, midline incision well-healed, has visible and palpable hernia in the low midline of the abdomen which is partially reducible and soft    ASSESSMENT:    Incisional hernia following right colon resection for cancer    PLAN:    We again discussed repair of the hernia.  Currently the hernia is asymptomatic and he does not desire repair.  Continue follow-up with the cancer center.  We will see him back in 6 months.          This document has been electronically signed by Frantz Shelley MD on December 30, 2019 10:10 AM

## 2020-03-13 ENCOUNTER — LAB (OUTPATIENT)
Dept: ONCOLOGY | Facility: HOSPITAL | Age: 80
End: 2020-03-13

## 2020-03-13 ENCOUNTER — OFFICE VISIT (OUTPATIENT)
Dept: ONCOLOGY | Facility: CLINIC | Age: 80
End: 2020-03-13

## 2020-03-13 VITALS
RESPIRATION RATE: 16 BRPM | SYSTOLIC BLOOD PRESSURE: 137 MMHG | WEIGHT: 202.3 LBS | TEMPERATURE: 97.7 F | HEIGHT: 71 IN | DIASTOLIC BLOOD PRESSURE: 89 MMHG | HEART RATE: 61 BPM | BODY MASS INDEX: 28.32 KG/M2

## 2020-03-13 DIAGNOSIS — D69.6 THROMBOCYTOPENIA (HCC): ICD-10-CM

## 2020-03-13 DIAGNOSIS — T45.1X5A NEUROPATHY DUE TO CHEMOTHERAPEUTIC DRUG (HCC): ICD-10-CM

## 2020-03-13 DIAGNOSIS — E53.8 B12 DEFICIENCY: ICD-10-CM

## 2020-03-13 DIAGNOSIS — D64.9 ANEMIA, UNSPECIFIED TYPE: ICD-10-CM

## 2020-03-13 DIAGNOSIS — C18.2 MALIGNANT NEOPLASM OF ASCENDING COLON (HCC): Primary | ICD-10-CM

## 2020-03-13 DIAGNOSIS — G62.0 NEUROPATHY DUE TO CHEMOTHERAPEUTIC DRUG (HCC): ICD-10-CM

## 2020-03-13 DIAGNOSIS — C18.2 MALIGNANT NEOPLASM OF ASCENDING COLON (HCC): ICD-10-CM

## 2020-03-13 LAB
ALBUMIN SERPL-MCNC: 4.3 G/DL (ref 3.5–5.2)
ALBUMIN/GLOB SERPL: 1.4 G/DL
ALP SERPL-CCNC: 48 U/L (ref 39–117)
ALT SERPL W P-5'-P-CCNC: 15 U/L (ref 1–41)
ANION GAP SERPL CALCULATED.3IONS-SCNC: 12 MMOL/L (ref 5–15)
AST SERPL-CCNC: 20 U/L (ref 1–40)
BASOPHILS # BLD AUTO: 0.03 10*3/MM3 (ref 0–0.2)
BASOPHILS NFR BLD AUTO: 0.8 % (ref 0–1.5)
BILIRUB SERPL-MCNC: 1 MG/DL (ref 0.2–1.2)
BUN BLD-MCNC: 11 MG/DL (ref 8–23)
BUN/CREAT SERPL: 12.8 (ref 7–25)
CALCIUM SPEC-SCNC: 9.2 MG/DL (ref 8.6–10.5)
CEA SERPL-MCNC: 1.82 NG/ML
CHLORIDE SERPL-SCNC: 104 MMOL/L (ref 98–107)
CO2 SERPL-SCNC: 23 MMOL/L (ref 22–29)
CREAT BLD-MCNC: 0.86 MG/DL (ref 0.76–1.27)
DEPRECATED RDW RBC AUTO: 43.8 FL (ref 37–54)
EOSINOPHIL # BLD AUTO: 0.17 10*3/MM3 (ref 0–0.4)
EOSINOPHIL NFR BLD AUTO: 4.5 % (ref 0.3–6.2)
ERYTHROCYTE [DISTWIDTH] IN BLOOD BY AUTOMATED COUNT: 13.9 % (ref 12.3–15.4)
FERRITIN SERPL-MCNC: 293.6 NG/ML (ref 30–400)
FOLATE SERPL-MCNC: 8.16 NG/ML (ref 4.78–24.2)
GFR SERPL CREATININE-BSD FRML MDRD: 86 ML/MIN/1.73
GLOBULIN UR ELPH-MCNC: 3 GM/DL
GLUCOSE BLD-MCNC: 109 MG/DL (ref 65–99)
HCT VFR BLD AUTO: 39.9 % (ref 37.5–51)
HGB BLD-MCNC: 13.5 G/DL (ref 13–17.7)
IMM GRANULOCYTES # BLD AUTO: 0.01 10*3/MM3 (ref 0–0.05)
IMM GRANULOCYTES NFR BLD AUTO: 0.3 % (ref 0–0.5)
IRON 24H UR-MRATE: 100 MCG/DL (ref 59–158)
IRON SATN MFR SERPL: 36 % (ref 20–50)
LYMPHOCYTES # BLD AUTO: 0.81 10*3/MM3 (ref 0.7–3.1)
LYMPHOCYTES NFR BLD AUTO: 21.3 % (ref 19.6–45.3)
MCH RBC QN AUTO: 29.3 PG (ref 26.6–33)
MCHC RBC AUTO-ENTMCNC: 33.8 G/DL (ref 31.5–35.7)
MCV RBC AUTO: 86.7 FL (ref 79–97)
MONOCYTES # BLD AUTO: 0.31 10*3/MM3 (ref 0.1–0.9)
MONOCYTES NFR BLD AUTO: 8.1 % (ref 5–12)
NEUTROPHILS # BLD AUTO: 2.48 10*3/MM3 (ref 1.7–7)
NEUTROPHILS NFR BLD AUTO: 65 % (ref 42.7–76)
NRBC BLD AUTO-RTO: 0 /100 WBC (ref 0–0.2)
PLATELET # BLD AUTO: 91 10*3/MM3 (ref 140–450)
PMV BLD AUTO: 13 FL (ref 6–12)
POTASSIUM BLD-SCNC: 4 MMOL/L (ref 3.5–5.2)
PROT SERPL-MCNC: 7.3 G/DL (ref 6–8.5)
RBC # BLD AUTO: 4.6 10*6/MM3 (ref 4.14–5.8)
SODIUM BLD-SCNC: 139 MMOL/L (ref 136–145)
TIBC SERPL-MCNC: 279 MCG/DL (ref 298–536)
TRANSFERRIN SERPL-MCNC: 187 MG/DL (ref 200–360)
VIT B12 BLD-MCNC: 697 PG/ML (ref 211–946)
WBC NRBC COR # BLD: 3.81 10*3/MM3 (ref 3.4–10.8)

## 2020-03-13 PROCEDURE — 85025 COMPLETE CBC W/AUTO DIFF WBC: CPT | Performed by: INTERNAL MEDICINE

## 2020-03-13 PROCEDURE — 99214 OFFICE O/P EST MOD 30 MIN: CPT | Performed by: INTERNAL MEDICINE

## 2020-03-13 PROCEDURE — 82607 VITAMIN B-12: CPT | Performed by: INTERNAL MEDICINE

## 2020-03-13 PROCEDURE — 80053 COMPREHEN METABOLIC PANEL: CPT | Performed by: INTERNAL MEDICINE

## 2020-03-13 PROCEDURE — 84466 ASSAY OF TRANSFERRIN: CPT | Performed by: INTERNAL MEDICINE

## 2020-03-13 PROCEDURE — 82746 ASSAY OF FOLIC ACID SERUM: CPT | Performed by: INTERNAL MEDICINE

## 2020-03-13 PROCEDURE — G0463 HOSPITAL OUTPT CLINIC VISIT: HCPCS | Performed by: INTERNAL MEDICINE

## 2020-03-13 PROCEDURE — G9903 PT SCRN TBCO ID AS NON USER: HCPCS | Performed by: INTERNAL MEDICINE

## 2020-03-13 PROCEDURE — G8731 PAIN NEG NO PLAN: HCPCS | Performed by: INTERNAL MEDICINE

## 2020-03-13 PROCEDURE — 82728 ASSAY OF FERRITIN: CPT | Performed by: INTERNAL MEDICINE

## 2020-03-13 PROCEDURE — 82378 CARCINOEMBRYONIC ANTIGEN: CPT | Performed by: INTERNAL MEDICINE

## 2020-03-13 PROCEDURE — 1123F ACP DISCUSS/DSCN MKR DOCD: CPT | Performed by: INTERNAL MEDICINE

## 2020-03-13 PROCEDURE — 83540 ASSAY OF IRON: CPT | Performed by: INTERNAL MEDICINE

## 2020-03-13 NOTE — PROGRESS NOTES
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 Patient underwent colonoscopy on 2018 by Dr. Goins which showed infiltrating nonobstructing mass at the ileocecal valve.  Biopsy came back positive for adenocarcinoma.  Subsequently patient was referred to Dr. Shelley and underwent surgical resection on 2018.    Patient is here for follow-up appointment today.   States his fatigue is improved.  Denies any stomach upset or excessive nausea and vomiting with ferrous sulfate.       PAST MEDICAL HISTORY:    Past Medical History:   Diagnosis Date   • Colon cancer (CMS/HCC)    • Hernia    • High cholesterol        SOCIAL HISTORY:    Social History     Tobacco Use   • Smoking status: Former Smoker     Packs/day: 1.00     Years: 30.00     Pack years: 30.00     Types: Cigarettes     Last attempt to quit:      Years since quittin.2   • Smokeless tobacco: Current User     Types: Chew   Substance Use Topics   • Alcohol use: No   • Drug use: No       Surgical History :  Past Surgical History:   Procedure Laterality Date   • COLON RESECTION     • COLON RESECTION N/A 2018    Procedure: Open Right Colon Resection with repair of incisional hernias;  Surgeon: Frantz Shelley MD;  Location: [facility];  Service: General   • COLON SURGERY     • COLONOSCOPY     • COLONOSCOPY N/A 2018    Procedure: COLONOSCOPY;  Surgeon: Austin Goins DO;   "Location: [facility] ENDOSCOPY;  Service: Gastroenterology   • COLONOSCOPY N/A 4/15/2019    Procedure: COLONOSCOPY;  Surgeon: Austin Goins DO;  Location: [facility] ENDOSCOPY;  Service: Gastroenterology   • ENDOSCOPY N/A 4/4/2018    Procedure: ESOPHAGOGASTRODUODENOSCOPY;  Surgeon: Austin Goins DO;  Location: [facility] ENDOSCOPY;  Service: Gastroenterology   • FINGER SURGERY         ALLERGIES:    No Known Allergies      FAMILY HISTORY:  Family History   Problem Relation Age of Onset   • Lung cancer Mother            REVIEW OF SYSTEMS:      CONSTITUTIONAL: Complains of fatigue.  Admits to intentional weight loss of 10 pounds since last clinic visit.  No fever, chills, or night sweats.     HEENT:  No epistaxis, mouth sores, or difficulty swallowing.    RESPIRATORY:  No new shortness of breath or cough at present.    CARDIOVASCULAR:  No chest pain or palpitations.    GASTROINTESTINAL:   No abdominal pain, nausea, vomiting or blood in stool.    GENITOURINARY:  No dysuria or hematuria.    MUSCULOSKELETAL:  Positive for chronic arthritic pain    NEUROLOGICAL:  Complains of chronic tingling and numbness affecting bilateral hand and bilateral feet since chemotherapy in 2012, denies any recent worsening. No new headache or dizziness.     LYMPHATICS:  Denies any abnormal swollen and anywhere in the body.    SKIN:  Denies any new skin rash.        PHYSICAL EXAMINATION:      VITAL SIGNS:  /89   Pulse 61   Temp 97.7 °F (36.5 °C) (Temporal)   Resp 16   Ht 180.3 cm (70.98\")   Wt 91.8 kg (202 lb 4.8 oz)   BMI 28.23 kg/m²       03/13/20  1113   Weight: 91.8 kg (202 lb 4.8 oz)       ECOG  performance status: 2      GENERAL:  Not in any distress.    HEENT:  Normocephalic, Atraumatic.Mild Conjunctival pallor. No icterus. Extraocular Movements Intact. No Facial Asymmetry noted.    NECK:  No adenopathy. No JVD.  Trachea in midline.    RESPIRATORY:  Fair air entry bilateral. No rhonchi or wheezing.    CARDIOVASCULAR:  S1, S2. " Regular rate and rhythm. No murmur or gallop appreciated.    ABDOMEN:  Soft, obese, nontender. Bowel sounds present in all four quadrants.  No hepatosplenomegaly appreciated.  Well-healed surgical scar present in midline.    MUSCULOSKELETAL:  No edema.No Calf Tenderness.Decreased range of motion.    NEUROLOGIC:  Alert, awake and oriented ×3.  No  Motor deficit appreciated. Cranial Nerves 2-12 grossly intact.    SKIN : No new skin lesion identified.  Skin is warm and moist.    LYMPHATICS: No new enlarged lymph nodes in neck or supraclavicular area.    PSYCHIATRY: Normal affect.  Normal judgment.  Makes good eye contact.        DIAGNOSTIC DATA:    Glucose   Date Value Ref Range Status   03/13/2020 109 (H) 65 - 99 mg/dL Final     Sodium   Date Value Ref Range Status   03/13/2020 139 136 - 145 mmol/L Final   04/05/2018 139 136 - 145 mmol/L Final     Potassium   Date Value Ref Range Status   03/13/2020 4.0 3.5 - 5.2 mmol/L Final   04/05/2018 4.1 3.3 - 5.0 mmol/L Final     CO2   Date Value Ref Range Status   03/13/2020 23.0 22.0 - 29.0 mmol/L Final     Total CO2   Date Value Ref Range Status   04/05/2018 25 20 - 31 mmol/L Final     Chloride   Date Value Ref Range Status   03/13/2020 104 98 - 107 mmol/L Final   04/05/2018 106 99 - 111 mmol/L Final     Anion Gap   Date Value Ref Range Status   03/13/2020 12.0 5.0 - 15.0 mmol/L Final     Creatinine   Date Value Ref Range Status   03/13/2020 0.86 0.76 - 1.27 mg/dL Final   04/05/2018 1.0 0.6 - 1.3 mg/dL Final     BUN   Date Value Ref Range Status   03/13/2020 11 8 - 23 mg/dL Final   04/05/2018 11 7.0 - 18.0 mg/dL Final     BUN/Creatinine Ratio   Date Value Ref Range Status   03/13/2020 12.8 7.0 - 25.0 Final     Calcium   Date Value Ref Range Status   03/13/2020 9.2 8.6 - 10.5 mg/dL Final   04/05/2018 8.5 8.1 - 10.2 mg/dL Final     eGFR Non  Amer   Date Value Ref Range Status   03/13/2020 86 >60 mL/min/1.73 Final     Alkaline Phosphatase   Date Value Ref Range Status    03/13/2020 48 39 - 117 U/L Final   04/05/2018 55 30 - 120 IU/L Final     Total Protein   Date Value Ref Range Status   03/13/2020 7.3 6.0 - 8.5 g/dL Final     ALT (SGPT)   Date Value Ref Range Status   03/13/2020 15 1 - 41 U/L Final   04/05/2018 10 (L) 17 - 63 IU/L Final     AST (SGOT)   Date Value Ref Range Status   03/13/2020 20 1 - 40 U/L Final   04/05/2018 17 15 - 41 IU/L Final     Total Bilirubin   Date Value Ref Range Status   03/13/2020 1.0 0.2 - 1.2 mg/dL Final   04/05/2018 0.42 0 - 1.50 mg/dL Final     Albumin   Date Value Ref Range Status   03/13/2020 4.30 3.50 - 5.20 g/dL Final   04/05/2018 3.8 3.4 - 5.0 g/dl Final     Globulin   Date Value Ref Range Status   03/13/2020 3.0 gm/dL Final     Lab Results   Component Value Date    WBC 3.81 03/13/2020    HGB 13.5 03/13/2020    HCT 39.9 03/13/2020    MCV 86.7 03/13/2020    PLT 91 (L) 03/13/2020     Lab Results   Component Value Date    NEUTROABS 2.48 03/13/2020    IRON 100 03/13/2020    TIBC 279 (L) 03/13/2020    LABIRON 36 03/13/2020    FERRITIN 335.70 12/18/2019    AIGPBMTK74 1,760 (H) 12/18/2019    FOLATE >20.00 12/18/2019     Lab Results   Component Value Date    CEA 1.56 09/20/2019           PATHOLOGY:  Pathology  report from April 18, 2018 showed:  Final Diagnosis   SEGMENT OF TERMINAL ILEUM AND ASCENDING COLON:  ADENOCARCINOMA, MODERATELY DIFFERENTIATED WITH MUCINOUS FEATURES OF CECUM, 6.0 CM IN GREATEST DIMENSION.  THE NEOPLASM EXTENDS TO THE SUBSEROSAL FAT.   RESECTION MARGINS, ANGIOLYMPHATIC VESSELS AND 22 REGIONAL LYMPH  NODES ARE FREE OF TUMOR.  pT3-N/O  SEE SYNOPTIC REPORT         Electronically signed by Sanjay Fairbanks MD on 4/25/2018 at 0937   Synoptic Checklist   COLON AND RECTUM: Resection, Including Transanal Disk Excision of Rectal Neoplasms   Colon Res - All Specimens   SPECIMEN   Procedure  Right hemicolectomy   TUMOR   Tumor Site  Cecum   Histologic Type  Adenocarcinoma   Histologic Grade  G2: Moderately differentiated   Tumor Size   Greatest dimension in Centimeters (cm): 6 cm   Tumor Deposits  Not identified   Tumor Extent     Tumor Extension  Tumor invades through the muscularis propria into pericolorectal tissue   Macroscopic Tumor Perforation  Not identified   Accessory Findings     Lymphovascular Invasion  Not identified   Perineural Invasion  Not identified   Type of Polyp in Which Invasive Carcinoma Arose  Villous adenoma   Treatment Effect  No known presurgical therapy   MARGINS   Margins  All margins are uninvolved by invasive carcinoma, high-grade dysplasia, intramucosal adenocarcinoma, and adenoma   Distance of Invasive Carcinoma from Closest Margin  Specify in Millimeters (mm): 60 mm   Specify Closest Margin  Proximal   LYMPH NODES   Number of Lymph Nodes Involved  Specify number: 0   Number of Lymph Nodes Examined  Specify number: 22   PATHOLOGIC STAGE CLASSIFICATION (pTNM)   Primary Tumor (pT)  pT3: Tumor invades through the muscularis propria into pericolorectal tissues   Regional Lymph Nodes (pN)  pN0: No regional lymph node metastasis                RADIOLOGY DATA :  CT of chest, abdomen and pelvis with contrast done on December 18, 2019 was reviewed, discussed with patient, it showed:  IMPRESSION:  6.5 mm noncalcified pulmonary nodule within the anterior right  middle lobe. No enlargement or morphological change. No  development of pulmonary mass or suspicious pulmonary nodule.     No acute pulmonary or pleural finding. Evidence of old  granulomatous disease.     No CT evidence of acute abdominal or pelvic finding.     Postsurgical changes of the right colon. No finding of metastatic  disease or adenopathy.     Diverticulosis without acute diverticulitis.     Redemonstration of large ventral abdominal wall hernia containing  mesenteric fat and small bowel without obstruction.            CT of abdomen and pelvis with contrast done on April 6, 2018  At [facility] showed:  Result Impression     1. The 6 x 7 mm nodule in the  middle lobe is an isolated finding that was not present on the May 2014 CT scan and is not confidently identified on the current chest film . A follow-up CT of the chest in 6 months is recommended .  2. Midline ventral hernias just above the umbilicus with no evidence of strangulation but possible low-grade obstruction; there is considerable barium in the colon and this does not appear to be causing problems  3. No evidence of metastatic disease or malignancy in the abdomen or pelvis               ASSESSMENT AND PLAN:      1.  adenocarcinoma of cecum, stage II, T3 N0 M0, 22 lymph nodes negative.  No lymphovascular invasion or perineural invasion.  No other high-risk feature on pathology.  -Result of pathology, CT of abdomen and pelvis and staging were discussed with patient and his family.  -It was discussed with patient and his family CT of chest to show 6 mm nodule in right middle lobe which needs close follow-up in 6 months.  -As per NCCN guidelines, treatment recommendation for stage II colon cancer includes close observation versus single agent chemotherapy with Xeloda or 5-FU/leucovorin.  Patient prefers observation without chemotherapy at this point.  -In view of no high-risk features on pathology report, recommend close observation with surveillance.  -CEA level done today on March 13, 2020 was normal at 1.82.  -CT of chest, abdomen and pelvis with contrast December 18, 2019 does not show any evidence of recurrence.  Result of CT scan were discussed with patient.  -Had a surveillance colonoscopy done in April 2019 by Dr. Goins.  Next surveillance colonoscopy will be due in April 2022.  - Next surveillance CT of chest, abdomen and pelvis with contrast will be due in December 2020.  --Will ask patient to return to clinic in 3 months with repeat CBC, CMP, CEA, and anemia workup to be done on that day      2.  Adenocarcinoma of transverse colon, stage III, T3 N1 M0 diagnosed in July 2012.  -Patient had a  surgery followed by adjuvant chemotherapy consisting of FOLFOX finished in March 2013.  -We will continue with a clinical surveillance for now.    3.  Microcytic anemia: Secondary to chronic blood loss from malignancy.  -Upon l clinic visit on June 14, 2013 patient was found to have iron deficiency.  Patient was started on ferrous sulfate along with vitamin B12 and folic acid.  -Hemoglobin is improved to 13.5 today.  Iron studies are stable.  No need to restart any iron supplement.  -Folate level is decreased to 8, will start patient on folic acid 1 mg p.o. daily.  Prescription for folic acid has been sent to his pharmacy.        4.  Vitamin B12 deficiency:  -Patient was found to have a borderline B12 of 320 today.  Patient is to take vitamin B12 injection every month at home that he has stopped doing for last few months.  Since vitamin B12 number is decreasing recommend doing B12 injection every 2 weeks until next clinic visit in 3 months.  -B12 number is 697.  Recommend monthly B12 injection at home.    5.  Mild thrombocytopenia.  Platelet count is 91,000 today.  We'll monitored with CBC for now.  Patient denies any active bleeding. If Patient has any worsening of thrombocytopenia and future, he will need bone marrow biopsy to rule out myelodysplasia which was discussed with patient.    6.  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  We'll monitor clinically for now without any medications.    7.  Health Maintenance: Patient does not smoke.  He just had a colonoscopy in April 2019 by Dr. Goins.  He will need surveillance colonoscopy in around April 2022.    8.  Advance Care Planning   ACP discussion was declined by the patient. Patient does not have an advance directive, information provided.    9. BMI: Patient's Body mass index is 28.23 kg/m². BMI is higher than reference range.  Patient was notified about her elevated BMI.    10.  Sunny Ernandez reports a pain score  of 0.  Given his pain assessment as noted, treatment options were discussed and the following options were decided upon as a follow-up plan to address the patient's pain: No intervention required.    11. PHQ-9 Total Score:             Dany Baig MD  3/13/2020  11:40        EMR Dragon/Transcription disclaimer:   Much of this encounter note is an electronic transcription/translation of spoken language to printed text. The electronic translation of spoken language may permit erroneous, or at times, nonsensical words or phrases to be inadvertently transcribed; Although I have reviewed the note for such errors, some may still exist.

## 2020-03-14 RX ORDER — FOLIC ACID 1 MG/1
1 TABLET ORAL DAILY
Qty: 90 TABLET | Refills: 1 | Status: SHIPPED | OUTPATIENT
Start: 2020-03-14 | End: 2021-01-25

## 2020-03-16 ENCOUNTER — TELEPHONE (OUTPATIENT)
Dept: ONCOLOGY | Facility: HOSPITAL | Age: 80
End: 2020-03-16

## 2020-03-16 NOTE — TELEPHONE ENCOUNTER
----- Message from Dany Baig MD sent at 3/14/2020 11:40 AM CDT -----  Please let patient know, he needs to continue with B12 injection monthly at home.  I have sent prescription for folic acid to his pharmacy.  He needs to start taking folic acid 1 mg p.o. daily.  Thank you

## 2020-06-26 ENCOUNTER — LAB (OUTPATIENT)
Dept: ONCOLOGY | Facility: HOSPITAL | Age: 80
End: 2020-06-26

## 2020-06-26 ENCOUNTER — OFFICE VISIT (OUTPATIENT)
Dept: ONCOLOGY | Facility: CLINIC | Age: 80
End: 2020-06-26

## 2020-06-26 VITALS
TEMPERATURE: 98 F | SYSTOLIC BLOOD PRESSURE: 138 MMHG | WEIGHT: 205.7 LBS | DIASTOLIC BLOOD PRESSURE: 76 MMHG | HEART RATE: 67 BPM | HEIGHT: 71 IN | BODY MASS INDEX: 28.8 KG/M2 | RESPIRATION RATE: 18 BRPM

## 2020-06-26 DIAGNOSIS — D69.6 THROMBOCYTOPENIA (HCC): ICD-10-CM

## 2020-06-26 DIAGNOSIS — G62.0 NEUROPATHY DUE TO CHEMOTHERAPEUTIC DRUG (HCC): ICD-10-CM

## 2020-06-26 DIAGNOSIS — E53.8 B12 DEFICIENCY: Primary | ICD-10-CM

## 2020-06-26 DIAGNOSIS — T45.1X5A NEUROPATHY DUE TO CHEMOTHERAPEUTIC DRUG (HCC): ICD-10-CM

## 2020-06-26 DIAGNOSIS — D64.9 ANEMIA, UNSPECIFIED TYPE: ICD-10-CM

## 2020-06-26 DIAGNOSIS — C18.2 MALIGNANT NEOPLASM OF ASCENDING COLON (HCC): ICD-10-CM

## 2020-06-26 DIAGNOSIS — E53.8 B12 DEFICIENCY: ICD-10-CM

## 2020-06-26 LAB
ALBUMIN SERPL-MCNC: 4 G/DL (ref 3.5–5.2)
ALBUMIN/GLOB SERPL: 1.9 G/DL
ALP SERPL-CCNC: 43 U/L (ref 39–117)
ALT SERPL W P-5'-P-CCNC: 13 U/L (ref 1–41)
ANION GAP SERPL CALCULATED.3IONS-SCNC: 7 MMOL/L (ref 5–15)
AST SERPL-CCNC: 20 U/L (ref 1–40)
BASOPHILS # BLD AUTO: 0.04 10*3/MM3 (ref 0–0.2)
BASOPHILS NFR BLD AUTO: 1.1 % (ref 0–1.5)
BILIRUB SERPL-MCNC: 0.9 MG/DL (ref 0.2–1.2)
BUN BLD-MCNC: 13 MG/DL (ref 8–23)
BUN/CREAT SERPL: 16 (ref 7–25)
CALCIUM SPEC-SCNC: 8.2 MG/DL (ref 8.6–10.5)
CEA SERPL-MCNC: 1.96 NG/ML
CHLORIDE SERPL-SCNC: 106 MMOL/L (ref 98–107)
CO2 SERPL-SCNC: 24 MMOL/L (ref 22–29)
CREAT BLD-MCNC: 0.81 MG/DL (ref 0.76–1.27)
DEPRECATED RDW RBC AUTO: 46.9 FL (ref 37–54)
EOSINOPHIL # BLD AUTO: 0.14 10*3/MM3 (ref 0–0.4)
EOSINOPHIL NFR BLD AUTO: 3.7 % (ref 0.3–6.2)
ERYTHROCYTE [DISTWIDTH] IN BLOOD BY AUTOMATED COUNT: 14.6 % (ref 12.3–15.4)
FERRITIN SERPL-MCNC: 275.3 NG/ML (ref 30–400)
FOLATE SERPL-MCNC: >20 NG/ML (ref 4.78–24.2)
GFR SERPL CREATININE-BSD FRML MDRD: 92 ML/MIN/1.73
GLOBULIN UR ELPH-MCNC: 2.1 GM/DL
GLUCOSE BLD-MCNC: 92 MG/DL (ref 65–99)
HCT VFR BLD AUTO: 37 % (ref 37.5–51)
HGB BLD-MCNC: 12.5 G/DL (ref 13–17.7)
IMM GRANULOCYTES # BLD AUTO: 0 10*3/MM3 (ref 0–0.05)
IMM GRANULOCYTES NFR BLD AUTO: 0 % (ref 0–0.5)
IRON 24H UR-MRATE: 93 MCG/DL (ref 59–158)
IRON SATN MFR SERPL: 35 % (ref 20–50)
LYMPHOCYTES # BLD AUTO: 0.92 10*3/MM3 (ref 0.7–3.1)
LYMPHOCYTES NFR BLD AUTO: 24.4 % (ref 19.6–45.3)
MCH RBC QN AUTO: 29.6 PG (ref 26.6–33)
MCHC RBC AUTO-ENTMCNC: 33.8 G/DL (ref 31.5–35.7)
MCV RBC AUTO: 87.5 FL (ref 79–97)
MONOCYTES # BLD AUTO: 0.36 10*3/MM3 (ref 0.1–0.9)
MONOCYTES NFR BLD AUTO: 9.5 % (ref 5–12)
NEUTROPHILS # BLD AUTO: 2.31 10*3/MM3 (ref 1.7–7)
NEUTROPHILS NFR BLD AUTO: 61.3 % (ref 42.7–76)
NRBC BLD AUTO-RTO: 0 /100 WBC (ref 0–0.2)
PLATELET # BLD AUTO: 80 10*3/MM3 (ref 140–450)
PMV BLD AUTO: 11.9 FL (ref 6–12)
POTASSIUM BLD-SCNC: 4.2 MMOL/L (ref 3.5–5.2)
PROT SERPL-MCNC: 6.1 G/DL (ref 6–8.5)
RBC # BLD AUTO: 4.23 10*6/MM3 (ref 4.14–5.8)
SODIUM BLD-SCNC: 137 MMOL/L (ref 136–145)
TIBC SERPL-MCNC: 264 MCG/DL (ref 298–536)
TRANSFERRIN SERPL-MCNC: 177 MG/DL (ref 200–360)
VIT B12 BLD-MCNC: 794 PG/ML (ref 211–946)
WBC NRBC COR # BLD: 3.77 10*3/MM3 (ref 3.4–10.8)

## 2020-06-26 PROCEDURE — 82746 ASSAY OF FOLIC ACID SERUM: CPT | Performed by: INTERNAL MEDICINE

## 2020-06-26 PROCEDURE — 99214 OFFICE O/P EST MOD 30 MIN: CPT | Performed by: INTERNAL MEDICINE

## 2020-06-26 PROCEDURE — 80053 COMPREHEN METABOLIC PANEL: CPT | Performed by: INTERNAL MEDICINE

## 2020-06-26 PROCEDURE — G9903 PT SCRN TBCO ID AS NON USER: HCPCS | Performed by: INTERNAL MEDICINE

## 2020-06-26 PROCEDURE — 82607 VITAMIN B-12: CPT | Performed by: INTERNAL MEDICINE

## 2020-06-26 PROCEDURE — 82378 CARCINOEMBRYONIC ANTIGEN: CPT | Performed by: INTERNAL MEDICINE

## 2020-06-26 PROCEDURE — 85025 COMPLETE CBC W/AUTO DIFF WBC: CPT | Performed by: INTERNAL MEDICINE

## 2020-06-26 PROCEDURE — G8731 PAIN NEG NO PLAN: HCPCS | Performed by: INTERNAL MEDICINE

## 2020-06-26 PROCEDURE — 1123F ACP DISCUSS/DSCN MKR DOCD: CPT | Performed by: INTERNAL MEDICINE

## 2020-06-26 PROCEDURE — 84466 ASSAY OF TRANSFERRIN: CPT | Performed by: INTERNAL MEDICINE

## 2020-06-26 PROCEDURE — G0463 HOSPITAL OUTPT CLINIC VISIT: HCPCS | Performed by: INTERNAL MEDICINE

## 2020-06-26 PROCEDURE — 83540 ASSAY OF IRON: CPT | Performed by: INTERNAL MEDICINE

## 2020-06-26 PROCEDURE — 82728 ASSAY OF FERRITIN: CPT | Performed by: INTERNAL MEDICINE

## 2020-06-26 NOTE — PROGRESS NOTES
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  Denies any bleeding.  Denies any new lymph node enlargement.  Complains of fatigue.        PAST MEDICAL HISTORY:    Past Medical History:   Diagnosis Date   • Colon cancer (CMS/HCC)    • Hernia    • High cholesterol        SOCIAL HISTORY:    Social History     Tobacco Use   • Smoking status: Former Smoker     Packs/day: 1.00     Years: 30.00     Pack years: 30.00     Types: Cigarettes     Last attempt to quit:      Years since quittin.4   • Smokeless tobacco: Current User     Types: Chew   Substance Use Topics   • Alcohol use: No   • Drug use: No       Surgical History :  Past Surgical History:   Procedure Laterality Date   • COLON RESECTION     • COLON RESECTION N/A 2018    Procedure: Open Right Colon Resection with repair of incisional hernias;  Surgeon: Frantz Shelley MD;  Location: [facility] OR;  Service: General   • COLON SURGERY     • COLONOSCOPY     • COLONOSCOPY N/A 2018    Procedure: COLONOSCOPY;  Surgeon: Austin Goins DO;  Location: [facility] ENDOSCOPY;  Service: Gastroenterology   • COLONOSCOPY N/A 4/15/2019    Procedure: COLONOSCOPY;  Surgeon: Austin Goins DO;  Location: [facility] ENDOSCOPY;  Service: Gastroenterology   • ENDOSCOPY N/A 2018    Procedure: ESOPHAGOGASTRODUODENOSCOPY;  Surgeon: Austin Goins DO;  Location: [facility] ENDOSCOPY;  Service: Gastroenterology   • FINGER SURGERY         ALLERGIES:    No Known  "Allergies      FAMILY HISTORY:  Family History   Problem Relation Age of Onset   • Lung cancer Mother            REVIEW OF SYSTEMS:      CONSTITUTIONAL: Complains of fatigue.  Has gained 3 pounds since last clinic visit.  Denies any fever or chills or night sweats.    HEENT:  No epistaxis, mouth sores, or difficulty swallowing.    RESPIRATORY:  No new shortness of breath or cough at present.    CARDIOVASCULAR:  No chest pain or palpitations.    GASTROINTESTINAL: No abdominal pain.  Denies any nausea or vomiting or diarrhea.    GENITOURINARY:  No dysuria or hematuria.    MUSCULOSKELETAL:  Positive for chronic arthritic pain    NEUROLOGICAL: Positive for chronic neuropathy affecting bilateral hand and feet. No new headache or dizziness.     LYMPHATICS:  Denies any abnormal swollen and anywhere in the body.    SKIN:  Denies any new skin rash.        PHYSICAL EXAMINATION:      VITAL SIGNS:  /76   Pulse 67   Temp 98 °F (36.7 °C) (Temporal)   Resp 18   Ht 180.3 cm (70.98\")   Wt 93.3 kg (205 lb 11.2 oz)   BMI 28.70 kg/m²       06/26/20  1307   Weight: 93.3 kg (205 lb 11.2 oz)       ECOG  performance status: 2      GENERAL:  Not in any distress.    HEENT:  Normocephalic, Atraumatic.Mild Conjunctival pallor. No icterus. Extraocular Movements Intact. No Facial Asymmetry noted.    NECK:  No adenopathy. No JVD.  Trachea in midline.    RESPIRATORY: Fair air entry bilaterally.  No rhonchi or wheezing.  Fair respiratory effort.    CARDIOVASCULAR:  S1, S2. Regular rate and rhythm. No murmur or gallop appreciated.    ABDOMEN: Soft, obese, nontender.  Bowel sounds present in all 4 quadrants.  No hepatosplenomegaly appreciated.  Surgical scar present.    MUSCULOSKELETAL: No Edema.  No calf tenderness.  Decreased range of motion.    NEUROLOGIC:  Alert, awake and oriented ×3.  No  Motor deficit appreciated. Cranial Nerves 2-12 grossly intact.    SKIN : No new skin lesion identified.  Skin is warm and moist.    LYMPHATICS: No " new enlarged lymph nodes in neck or supraclavicular area.    PSYCHIATRY: Normal affect.  Normal judgment.  Makes good eye contact.        DIAGNOSTIC DATA:    Glucose   Date Value Ref Range Status   06/26/2020 92 65 - 99 mg/dL Final     Sodium   Date Value Ref Range Status   06/26/2020 137 136 - 145 mmol/L Final   04/05/2018 139 136 - 145 mmol/L Final     Potassium   Date Value Ref Range Status   06/26/2020 4.2 3.5 - 5.2 mmol/L Final   04/05/2018 4.1 3.3 - 5.0 mmol/L Final     CO2   Date Value Ref Range Status   06/26/2020 24.0 22.0 - 29.0 mmol/L Final     Total CO2   Date Value Ref Range Status   04/05/2018 25 20 - 31 mmol/L Final     Chloride   Date Value Ref Range Status   06/26/2020 106 98 - 107 mmol/L Final   04/05/2018 106 99 - 111 mmol/L Final     Anion Gap   Date Value Ref Range Status   06/26/2020 7.0 5.0 - 15.0 mmol/L Final     Creatinine   Date Value Ref Range Status   06/26/2020 0.81 0.76 - 1.27 mg/dL Final   04/05/2018 1.0 0.6 - 1.3 mg/dL Final     BUN   Date Value Ref Range Status   06/26/2020 13 8 - 23 mg/dL Final   04/05/2018 11 7.0 - 18.0 mg/dL Final     BUN/Creatinine Ratio   Date Value Ref Range Status   06/26/2020 16.0 7.0 - 25.0 Final     Calcium   Date Value Ref Range Status   06/26/2020 8.2 (L) 8.6 - 10.5 mg/dL Final   04/05/2018 8.5 8.1 - 10.2 mg/dL Final     eGFR Non  Amer   Date Value Ref Range Status   06/26/2020 92 >60 mL/min/1.73 Final     Alkaline Phosphatase   Date Value Ref Range Status   06/26/2020 43 39 - 117 U/L Final   04/05/2018 55 30 - 120 IU/L Final     Total Protein   Date Value Ref Range Status   06/26/2020 6.1 6.0 - 8.5 g/dL Final     ALT (SGPT)   Date Value Ref Range Status   06/26/2020 13 1 - 41 U/L Final   04/05/2018 10 (L) 17 - 63 IU/L Final     AST (SGOT)   Date Value Ref Range Status   06/26/2020 20 1 - 40 U/L Final   04/05/2018 17 15 - 41 IU/L Final     Total Bilirubin   Date Value Ref Range Status   06/26/2020 0.9 0.2 - 1.2 mg/dL Final   04/05/2018 0.42 0 -  1.50 mg/dL Final     Albumin   Date Value Ref Range Status   06/26/2020 4.00 3.50 - 5.20 g/dL Final   04/05/2018 3.8 3.4 - 5.0 g/dl Final     Globulin   Date Value Ref Range Status   06/26/2020 2.1 gm/dL Final     Lab Results   Component Value Date    WBC 3.77 06/26/2020    HGB 12.5 (L) 06/26/2020    HCT 37.0 (L) 06/26/2020    MCV 87.5 06/26/2020    PLT 80 (L) 06/26/2020     Lab Results   Component Value Date    NEUTROABS 2.31 06/26/2020    IRON 93 06/26/2020    TIBC 264 (L) 06/26/2020    LABIRON 35 06/26/2020    FERRITIN 275.30 06/26/2020    [id number] 03/13/2020    FOLATE 8.16 03/13/2020     Lab Results   Component Value Date    CEA 1.82 03/13/2020           PATHOLOGY:  Pathology  report from April 18, 2018 showed:  Final Diagnosis   SEGMENT OF TERMINAL ILEUM AND ASCENDING COLON:  ADENOCARCINOMA, MODERATELY DIFFERENTIATED WITH MUCINOUS FEATURES OF CECUM, 6.0 CM IN GREATEST DIMENSION.  THE NEOPLASM EXTENDS TO THE SUBSEROSAL FAT.   RESECTION MARGINS, ANGIOLYMPHATIC VESSELS AND 22 REGIONAL LYMPH  NODES ARE FREE OF TUMOR.  pT3-N/O  SEE SYNOPTIC REPORT         Electronically signed by Sanjay Fairbanks MD on 4/25/2018 at 0937   Synoptic Checklist   COLON AND RECTUM: Resection, Including Transanal Disk Excision of Rectal Neoplasms   Colon Res - All Specimens   SPECIMEN   Procedure  Right hemicolectomy   TUMOR   Tumor Site  Cecum   Histologic Type  Adenocarcinoma   Histologic Grade  G2: Moderately differentiated   Tumor Size  Greatest dimension in Centimeters (cm): 6 cm   Tumor Deposits  Not identified   Tumor Extent     Tumor Extension  Tumor invades through the muscularis propria into pericolorectal tissue   Macroscopic Tumor Perforation  Not identified   Accessory Findings     Lymphovascular Invasion  Not identified   Perineural Invasion  Not identified   Type of Polyp in Which Invasive Carcinoma Arose  Villous adenoma   Treatment Effect  No known presurgical therapy   MARGINS   Margins  All margins are uninvolved  by invasive carcinoma, high-grade dysplasia, intramucosal adenocarcinoma, and adenoma   Distance of Invasive Carcinoma from Closest Margin  Specify in Millimeters (mm): 60 mm   Specify Closest Margin  Proximal   LYMPH NODES   Number of Lymph Nodes Involved  Specify number: 0   Number of Lymph Nodes Examined  Specify number: 22   PATHOLOGIC STAGE CLASSIFICATION (pTNM)   Primary Tumor (pT)  pT3: Tumor invades through the muscularis propria into pericolorectal tissues   Regional Lymph Nodes (pN)  pN0: No regional lymph node metastasis                RADIOLOGY DATA :  CT of chest, abdomen and pelvis with contrast done on December 18, 2019 was reviewed, discussed with patient, it showed:  IMPRESSION:  6.5 mm noncalcified pulmonary nodule within the anterior right  middle lobe. No enlargement or morphological change. No  development of pulmonary mass or suspicious pulmonary nodule.     No acute pulmonary or pleural finding. Evidence of old  granulomatous disease.     No CT evidence of acute abdominal or pelvic finding.     Postsurgical changes of the right colon. No finding of metastatic  disease or adenopathy.     Diverticulosis without acute diverticulitis.     Redemonstration of large ventral abdominal wall hernia containing  mesenteric fat and small bowel without obstruction.            CT of abdomen and pelvis with contrast done on April 6, 2018  At [facility] showed:  Result Impression     1. The 6 x 7 mm nodule in the middle lobe is an isolated finding that was not present on the May 2014 CT scan and is not confidently identified on the current chest film . A follow-up CT of the chest in 6 months is recommended .  2. Midline ventral hernias just above the umbilicus with no evidence of strangulation but possible low-grade obstruction; there is considerable barium in the colon and this does not appear to be causing problems  3. No evidence of metastatic disease or malignancy in the abdomen or pelvis                ASSESSMENT AND PLAN:      1.  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  - Patient has been on surveillance since his surgery.  - Recent surveillance CT of chest abdomen and pelvis with contrast done on December 18, 2019 did not show any evidence of recurrence.  Neck surveillance CT scan will be due in December 2020.  - Had a surveillance colonoscopy by Dr. Goins in April 2019 which was negative for malignancy.  Next surveillance colonoscopy will be due in April 2022.  -We will follow-up on result of CEA from today.  - We will ask patient to return to clinic in 3 months with repeat CBC, CMP, CEA and anemia work-up to be done on that day.    2.  Adenocarcinoma of transverse colon stage III T3 N1 M0 diagnosed in July 2012.  - Had a surgery followed by adjuvant chemotherapy consisting of FOLFOX completed in March 2013.  -We will continue with clinical surveillance.    3.  Anemia:  - Hemoglobin is decreased to 12.5 today.  - Patient remains on folic acid 1 mg p.o. daily.  - We will follow-up on result of anemia work-up from today.      4 vitamin B12 deficiency:  - Patient remains on monthly B12 injection at home.    5.  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  -Platelet count is 80,000.  Monitor with CBC.    6.  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  We will continue with clinical monitoring.    7.  Health maintenance: Patient does not smoke.  Had a colonoscopy in April 2019 by Dr. Goins.          8. Advance Care Planning: For now patient remains full code and is able to make decisions.  Patient has health care surrogate mentioned on chart.      9. Sunny Ernandez reports a pain score of 0.  Given his pain assessment as noted, treatment options were discussed and the following options were decided upon as a follow-up plan to address the patient's pain: No intervention required.    10. PHQ-9 Total  Score:             Dany Baig MD  6/26/2020  13:42        EMR Dragon/Transcription disclaimer:   Much of this encounter note is an electronic transcription/translation of spoken language to printed text. The electronic translation of spoken language may permit erroneous, or at times, nonsensical words or phrases to be inadvertently transcribed; Although I have reviewed the note for such errors, some may still exist.

## 2020-06-29 ENCOUNTER — OFFICE VISIT (OUTPATIENT)
Dept: SURGERY | Facility: CLINIC | Age: 80
End: 2020-06-29

## 2020-06-29 ENCOUNTER — TELEPHONE (OUTPATIENT)
Dept: ONCOLOGY | Facility: CLINIC | Age: 80
End: 2020-06-29

## 2020-06-29 VITALS
HEART RATE: 63 BPM | DIASTOLIC BLOOD PRESSURE: 82 MMHG | WEIGHT: 205.8 LBS | SYSTOLIC BLOOD PRESSURE: 118 MMHG | BODY MASS INDEX: 28.81 KG/M2 | TEMPERATURE: 97.2 F | HEIGHT: 71 IN

## 2020-06-29 DIAGNOSIS — Z09 FOLLOW UP: Primary | ICD-10-CM

## 2020-06-29 PROCEDURE — 99212 OFFICE O/P EST SF 10 MIN: CPT | Performed by: SURGERY

## 2020-06-29 NOTE — TELEPHONE ENCOUNTER
----- Message from Dany Baig MD sent at 6/27/2020  9:50 AM CDT -----  Please let patient know that he needs to continue taking monthly B12 injection.  He also needs to continue taking folic acid 1 tablet p.o. daily.  His cancer testing blood was normal at 1.96.  Thank you

## 2020-06-29 NOTE — PROGRESS NOTES
CHIEF COMPLAINT:    Chief Complaint   Patient presents with   • Follow-up     6 Mos. follow -up incisional hernia       HISTORY OF PRESENT ILLNESS:    Sunny Ernandez is a 80 y.o. male who underwent prior open right colon resection for colon cancer.  He had previously undergone sigmoid colon resection for the same.  He has a known incisional hernia.  He returns today for follow-up.  He reports no obstructive symptoms or pain from his hernia.  He is having regular bowel function with no blood in his stools.  He has continued follow-up with the cancer center.  He has last colonoscopy in April 2019 showed no evidence of malignancy.    EXAM:  Vitals:    06/29/20 0913   BP: 118/82   Pulse: 63   Temp: 97.2 °F (36.2 °C)         Abdomen soft with reducible incisional hernia    ASSESSMENT:    Incisional hernia following colon resection    PLAN:    Currently the patient is asymptomatic from his incisional hernia.  He has continued follow-up with cancer center.  He can see me if he would like hernia repair or if other issues arise.  Otherwise will be seen as needed.          This document has been electronically signed by Frantz Shelley MD on June 29, 2020 09:25

## 2020-09-25 ENCOUNTER — LAB (OUTPATIENT)
Dept: ONCOLOGY | Facility: HOSPITAL | Age: 80
End: 2020-09-25

## 2020-09-25 ENCOUNTER — OFFICE VISIT (OUTPATIENT)
Dept: ONCOLOGY | Facility: CLINIC | Age: 80
End: 2020-09-25

## 2020-09-25 VITALS
WEIGHT: 211.4 LBS | DIASTOLIC BLOOD PRESSURE: 86 MMHG | HEART RATE: 52 BPM | TEMPERATURE: 97.5 F | BODY MASS INDEX: 29.6 KG/M2 | HEIGHT: 71 IN | RESPIRATION RATE: 18 BRPM | SYSTOLIC BLOOD PRESSURE: 155 MMHG

## 2020-09-25 DIAGNOSIS — E53.8 B12 DEFICIENCY: ICD-10-CM

## 2020-09-25 DIAGNOSIS — G62.0 NEUROPATHY DUE TO CHEMOTHERAPEUTIC DRUG (HCC): ICD-10-CM

## 2020-09-25 DIAGNOSIS — C18.2 MALIGNANT NEOPLASM OF ASCENDING COLON (HCC): ICD-10-CM

## 2020-09-25 DIAGNOSIS — D64.9 ANEMIA, UNSPECIFIED TYPE: ICD-10-CM

## 2020-09-25 DIAGNOSIS — T45.1X5A NEUROPATHY DUE TO CHEMOTHERAPEUTIC DRUG (HCC): ICD-10-CM

## 2020-09-25 DIAGNOSIS — C18.2 MALIGNANT NEOPLASM OF ASCENDING COLON (HCC): Primary | ICD-10-CM

## 2020-09-25 DIAGNOSIS — D69.6 THROMBOCYTOPENIA (HCC): ICD-10-CM

## 2020-09-25 LAB
ALBUMIN SERPL-MCNC: 3.9 G/DL (ref 3.5–5.2)
ALBUMIN/GLOB SERPL: 1.7 G/DL
ALP SERPL-CCNC: 47 U/L (ref 39–117)
ALT SERPL W P-5'-P-CCNC: 9 U/L (ref 1–41)
ANION GAP SERPL CALCULATED.3IONS-SCNC: 8 MMOL/L (ref 5–15)
AST SERPL-CCNC: 15 U/L (ref 1–40)
BASOPHILS # BLD AUTO: 0.04 10*3/MM3 (ref 0–0.2)
BASOPHILS NFR BLD AUTO: 1.1 % (ref 0–1.5)
BILIRUB SERPL-MCNC: 0.7 MG/DL (ref 0–1.2)
BUN SERPL-MCNC: 14 MG/DL (ref 8–23)
BUN/CREAT SERPL: 16.1 (ref 7–25)
CALCIUM SPEC-SCNC: 8.3 MG/DL (ref 8.6–10.5)
CEA SERPL-MCNC: 1.83 NG/ML
CHLORIDE SERPL-SCNC: 107 MMOL/L (ref 98–107)
CO2 SERPL-SCNC: 26 MMOL/L (ref 22–29)
CREAT SERPL-MCNC: 0.87 MG/DL (ref 0.76–1.27)
DEPRECATED RDW RBC AUTO: 43.5 FL (ref 37–54)
EOSINOPHIL # BLD AUTO: 0.16 10*3/MM3 (ref 0–0.4)
EOSINOPHIL NFR BLD AUTO: 4.5 % (ref 0.3–6.2)
ERYTHROCYTE [DISTWIDTH] IN BLOOD BY AUTOMATED COUNT: 13.3 % (ref 12.3–15.4)
FERRITIN SERPL-MCNC: 229.2 NG/ML (ref 30–400)
FOLATE SERPL-MCNC: >20 NG/ML (ref 4.78–24.2)
GFR SERPL CREATININE-BSD FRML MDRD: 84 ML/MIN/1.73
GLOBULIN UR ELPH-MCNC: 2.3 GM/DL
GLUCOSE SERPL-MCNC: 97 MG/DL (ref 65–99)
HCT VFR BLD AUTO: 38.4 % (ref 37.5–51)
HGB BLD-MCNC: 13 G/DL (ref 13–17.7)
IMM GRANULOCYTES # BLD AUTO: 0.01 10*3/MM3 (ref 0–0.05)
IMM GRANULOCYTES NFR BLD AUTO: 0.3 % (ref 0–0.5)
IRON 24H UR-MRATE: 70 MCG/DL (ref 59–158)
IRON SATN MFR SERPL: 26 % (ref 20–50)
LYMPHOCYTES # BLD AUTO: 0.77 10*3/MM3 (ref 0.7–3.1)
LYMPHOCYTES NFR BLD AUTO: 21.4 % (ref 19.6–45.3)
MCH RBC QN AUTO: 29.9 PG (ref 26.6–33)
MCHC RBC AUTO-ENTMCNC: 33.9 G/DL (ref 31.5–35.7)
MCV RBC AUTO: 88.3 FL (ref 79–97)
MONOCYTES # BLD AUTO: 0.32 10*3/MM3 (ref 0.1–0.9)
MONOCYTES NFR BLD AUTO: 8.9 % (ref 5–12)
NEUTROPHILS NFR BLD AUTO: 2.29 10*3/MM3 (ref 1.7–7)
NEUTROPHILS NFR BLD AUTO: 63.8 % (ref 42.7–76)
NRBC BLD AUTO-RTO: 0 /100 WBC (ref 0–0.2)
PLATELET # BLD AUTO: 86 10*3/MM3 (ref 140–450)
PMV BLD AUTO: 12 FL (ref 6–12)
POTASSIUM SERPL-SCNC: 4.3 MMOL/L (ref 3.5–5.2)
PROT SERPL-MCNC: 6.2 G/DL (ref 6–8.5)
RBC # BLD AUTO: 4.35 10*6/MM3 (ref 4.14–5.8)
RBC MORPH BLD: NORMAL
SMALL PLATELETS BLD QL SMEAR: NORMAL
SODIUM SERPL-SCNC: 141 MMOL/L (ref 136–145)
TIBC SERPL-MCNC: 271 MCG/DL (ref 298–536)
TRANSFERRIN SERPL-MCNC: 182 MG/DL (ref 200–360)
VIT B12 BLD-MCNC: 816 PG/ML (ref 211–946)
WBC # BLD AUTO: 3.59 10*3/MM3 (ref 3.4–10.8)
WBC MORPH BLD: NORMAL

## 2020-09-25 PROCEDURE — G8731 PAIN NEG NO PLAN: HCPCS | Performed by: INTERNAL MEDICINE

## 2020-09-25 PROCEDURE — 83540 ASSAY OF IRON: CPT | Performed by: INTERNAL MEDICINE

## 2020-09-25 PROCEDURE — 99214 OFFICE O/P EST MOD 30 MIN: CPT | Performed by: INTERNAL MEDICINE

## 2020-09-25 PROCEDURE — 84466 ASSAY OF TRANSFERRIN: CPT | Performed by: INTERNAL MEDICINE

## 2020-09-25 PROCEDURE — G0463 HOSPITAL OUTPT CLINIC VISIT: HCPCS | Performed by: INTERNAL MEDICINE

## 2020-09-25 PROCEDURE — 82746 ASSAY OF FOLIC ACID SERUM: CPT | Performed by: INTERNAL MEDICINE

## 2020-09-25 PROCEDURE — 85025 COMPLETE CBC W/AUTO DIFF WBC: CPT | Performed by: INTERNAL MEDICINE

## 2020-09-25 PROCEDURE — 82607 VITAMIN B-12: CPT | Performed by: INTERNAL MEDICINE

## 2020-09-25 PROCEDURE — 82378 CARCINOEMBRYONIC ANTIGEN: CPT | Performed by: INTERNAL MEDICINE

## 2020-09-25 PROCEDURE — 1123F ACP DISCUSS/DSCN MKR DOCD: CPT | Performed by: INTERNAL MEDICINE

## 2020-09-25 PROCEDURE — G9903 PT SCRN TBCO ID AS NON USER: HCPCS | Performed by: INTERNAL MEDICINE

## 2020-09-25 PROCEDURE — 85007 BL SMEAR W/DIFF WBC COUNT: CPT | Performed by: INTERNAL MEDICINE

## 2020-09-25 PROCEDURE — 80053 COMPREHEN METABOLIC PANEL: CPT | Performed by: INTERNAL MEDICINE

## 2020-09-25 PROCEDURE — 82728 ASSAY OF FERRITIN: CPT | Performed by: INTERNAL MEDICINE

## 2020-09-25 NOTE — PROGRESS NOTES
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  Denies any bleeding.  Denies any new lymph node enlargement.  Complains of chronic fatigue.  Denies any chest pain shortness of breath.            PAST MEDICAL HISTORY:    Past Medical History:   Diagnosis Date   • Colon cancer (CMS/HCC)    • Hernia    • High cholesterol        SOCIAL HISTORY:    Social History     Tobacco Use   • Smoking status: Former Smoker     Packs/day: 1.00     Years: 30.00     Pack years: 30.00     Types: Cigarettes     Quit date:      Years since quittin.   • Smokeless tobacco: Current User     Types: Chew   Substance Use Topics   • Alcohol use: No   • Drug use: No       Surgical History :  Past Surgical History:   Procedure Laterality Date   • COLON RESECTION     • COLON RESECTION N/A 2018    Procedure: Open Right Colon Resection with repair of incisional hernias;  Surgeon: Frantz Shelley MD;  Location: [facility] OR;  Service: General   • COLON SURGERY     • COLONOSCOPY     • COLONOSCOPY N/A 2018    Procedure: COLONOSCOPY;  Surgeon: Austin Goins DO;  Location: [facility] ENDOSCOPY;  Service: Gastroenterology   • COLONOSCOPY N/A 4/15/2019    Procedure: COLONOSCOPY;  Surgeon: Austin Goins DO;  Location: [facility] ENDOSCOPY;  Service: Gastroenterology   • ENDOSCOPY N/A 2018    Procedure: ESOPHAGOGASTRODUODENOSCOPY;  Surgeon: Austin Goins DO;  Location: [facility] ENDOSCOPY;  Service: Gastroenterology   • FINGER SURGERY  "        ALLERGIES:    No Known Allergies      FAMILY HISTORY:  Family History   Problem Relation Age of Onset   • Lung cancer Mother            REVIEW OF SYSTEMS:      CONSTITUTIONAL:  Complains of fatigue.  Has gained 6 pounds since last clinic visit.  Denies any fever, chills .     EYES: No visual disturbances. No discharge. No new lesions    ENMT:  No epistaxis, mouth sores or difficulty swallowing.    RESPIRATORY:  No new shortness of breath. No new cough or hemoptysis.    CARDIOVASCULAR:  No chest pain or palpitations.    GASTROINTESTINAL:  No abdominal pain nausea, vomiting or blood in the stool.    GENITOURINARY: No Dysuria or Hematuria.    MUSCULOSKELETAL: Positive for chronic arthritis pain.    LYMPHATICS:  Denies any abnormal swollen glands anywhere in the body.    NEUROLOGICAL : Positive for chronic neuropathy affecting bilateral hand and feet, denies any recent worsening. No headache or dizziness. No seizures or balance problems.    SKIN: No new skin lesions.    ENDOCRINE : No new hot or cold intolerance. No new polyuria . No polydipsia.        PHYSICAL EXAMINATION:      VITAL SIGNS:  /86   Pulse 52   Temp 97.5 °F (36.4 °C) (Temporal)   Resp 18   Ht 180.3 cm (70.98\")   Wt 95.9 kg (211 lb 6.4 oz)   BMI 29.50 kg/m²       09/25/20  1104   Weight: 95.9 kg (211 lb 6.4 oz)         ECOG performance status: 2    CONSTITUTIONAL:  Not in any distress.    EYES: Mild conjunctival Pallor. No Icterus. No Pterygium. Extraocular Movements intact.No ptosis.    ENMT:  Normocephalic, Atraumatic.No Facial Asymmetry noted.    NECK:  No adenopathy.Trachea midline. NO JVD.    RESPIRATORY:  Fair air entry bilateral. No rhonchi or wheezing.Fair respiratory effort.    CARDIOVASCULAR:  S1, S2. Regular rate and rhythm. No murmur or gallop appreciated.      ABDOMEN:  Soft, obese, nontender. Bowel sounds present in all four quadrants.  No Hepatosplenomegaly appreciated.Surgical scar present.    MUSCULOSKELETAL:  No " edema.No Calf Tenderness.Decreased range of motion.    NEUROLOGIC:    No  Motor  deficit appreciated. Cranial Nerves 2-12 grossly intact.    SKIN : No new skin lesion identified. Skin is warm and moist to touch.    LYMPHATICS: No new enlarged lymph nodes in neck or supraclavicular area.    PSYCHIATRY: Alert, awake and oriented ×3.Normal affect.  Normal judgment.  Makes good eye contact.        DIAGNOSTIC DATA:    Glucose   Date Value Ref Range Status   09/25/2020 97 65 - 99 mg/dL Final     Sodium   Date Value Ref Range Status   09/25/2020 141 136 - 145 mmol/L Final   04/05/2018 139 136 - 145 mmol/L Final     Potassium   Date Value Ref Range Status   09/25/2020 4.3 3.5 - 5.2 mmol/L Final   04/05/2018 4.1 3.3 - 5.0 mmol/L Final     CO2   Date Value Ref Range Status   09/25/2020 26.0 22.0 - 29.0 mmol/L Final     Total CO2   Date Value Ref Range Status   04/05/2018 25 20 - 31 mmol/L Final     Chloride   Date Value Ref Range Status   09/25/2020 107 98 - 107 mmol/L Final   04/05/2018 106 99 - 111 mmol/L Final     Anion Gap   Date Value Ref Range Status   09/25/2020 8.0 5.0 - 15.0 mmol/L Final     Creatinine   Date Value Ref Range Status   09/25/2020 0.87 0.76 - 1.27 mg/dL Final   04/05/2018 1.0 0.6 - 1.3 mg/dL Final     BUN   Date Value Ref Range Status   09/25/2020 14 8 - 23 mg/dL Final   04/05/2018 11 7.0 - 18.0 mg/dL Final     BUN/Creatinine Ratio   Date Value Ref Range Status   09/25/2020 16.1 7.0 - 25.0 Final     Calcium   Date Value Ref Range Status   09/25/2020 8.3 (L) 8.6 - 10.5 mg/dL Final   04/05/2018 8.5 8.1 - 10.2 mg/dL Final     eGFR Non  Amer   Date Value Ref Range Status   09/25/2020 84 >60 mL/min/1.73 Final     Alkaline Phosphatase   Date Value Ref Range Status   09/25/2020 47 39 - 117 U/L Final   04/05/2018 55 30 - 120 IU/L Final     Total Protein   Date Value Ref Range Status   09/25/2020 6.2 6.0 - 8.5 g/dL Final     ALT (SGPT)   Date Value Ref Range Status   09/25/2020 9 1 - 41 U/L Final    04/05/2018 10 (L) 17 - 63 IU/L Final     AST (SGOT)   Date Value Ref Range Status   09/25/2020 15 1 - 40 U/L Final   04/05/2018 17 15 - 41 IU/L Final     Total Bilirubin   Date Value Ref Range Status   09/25/2020 0.7 0.0 - 1.2 mg/dL Final   04/05/2018 0.42 0 - 1.50 mg/dL Final     Albumin   Date Value Ref Range Status   09/25/2020 3.90 3.50 - 5.20 g/dL Final   04/05/2018 3.8 3.4 - 5.0 g/dl Final     Globulin   Date Value Ref Range Status   09/25/2020 2.3 gm/dL Final     Lab Results   Component Value Date    WBC 3.59 09/25/2020    HGB 13.0 09/25/2020    HCT 38.4 09/25/2020    MCV 88.3 09/25/2020    PLT 86 (L) 09/25/2020     Lab Results   Component Value Date    NEUTROABS 2.29 09/25/2020    IRON 70 09/25/2020    TIBC 271 (L) 09/25/2020    LABIRON 26 09/25/2020    FERRITIN 229.20 09/25/2020    [id number] 06/26/2020    FOLATE >20.00 06/26/2020     Lab Results   Component Value Date    CEA 1.96 06/26/2020               ASSESSMENT AND PLAN:      1.  Adenocarcinoma of cecum, stage II, T3 N0 M0, 22 lymph node negative at diagnosis in April 2018.  - Patient has been on surveillance since after surgery.  - CT of chest abdomen and pelvis with contrast done on December 18, 2019 did not show any evidence of recurrence.  -Had a surveillance colonoscopy done in April 2019 by Dr. Goins which was negative for malignancy.  - Next surveillance colonoscopy will be due in April 2020.  -We will follow-up on result of CEA from today.  -We will ask patient to return to clinic in 3 months with repeat CBC, CMP, CEA, anemia work-up and CT of chest abdomen and pelvis with contrast to be done prior to that.    2.  Adenocarcinoma of transverse colon, stage III, T3 N1 M0 diagnosed in July 2012  - Had a surgery followed by FOLFOX that completed in March 2013.  We will continue with clinical surveillance    3.  Anemia:  -Hemoglobin is 13  -Patient remains on folic acid 1 mg p.o. daily with monthly B12 injection at home.  - We will repeat  anemia work-up upon next clinic visit in 3 months.    4.  Vitamin B12 deficiency:  -Patient remains on monthly B12 injection at home    5.  Thrombocytopenia:  - Due to low-grade ITP plus or minus myelodysplasia  - Platelet count is 86,000.  Monitor with CBC    6.  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  Will continue with clinical monitoring.    7.  Health maintenance: Patient does not smoke.  Had a colonoscopy in April 2019 by Dr. Goins.    8. Advance Care Planning: For now patient remains full code and is able to make decisions.  Patient has health care surrogate mentioned on chart.         PHQ-9 Total Score: 0   -Patient is not homicidal or suicidal.  No acute intervention required.    Sunny Ernandez reports a pain score of 0.  Given his pain assessment as noted, treatment options were discussed and the following options were decided upon as a follow-up plan to address the patient's pain: No acute intervention required.         Dany Baig MD  9/25/2020  16:43 CDT        Part of this note may be an electronic transcription/translation of spoken language to printed text using the Dragon Dictation System.

## 2020-09-29 ENCOUNTER — OFFICE VISIT (OUTPATIENT)
Dept: SURGERY | Facility: CLINIC | Age: 80
End: 2020-09-29

## 2020-09-29 VITALS
HEART RATE: 59 BPM | DIASTOLIC BLOOD PRESSURE: 70 MMHG | WEIGHT: 213 LBS | HEIGHT: 71 IN | BODY MASS INDEX: 29.82 KG/M2 | SYSTOLIC BLOOD PRESSURE: 104 MMHG | TEMPERATURE: 97.6 F

## 2020-09-29 DIAGNOSIS — K43.2 INCISIONAL HERNIA, WITHOUT OBSTRUCTION OR GANGRENE: Primary | ICD-10-CM

## 2020-09-29 PROCEDURE — 99212 OFFICE O/P EST SF 10 MIN: CPT | Performed by: SURGERY

## 2020-10-01 ENCOUNTER — TELEPHONE (OUTPATIENT)
Dept: ONCOLOGY | Facility: HOSPITAL | Age: 80
End: 2020-10-01

## 2020-10-01 NOTE — TELEPHONE ENCOUNTER
----- Message from Dany Baig MD sent at 9/27/2020 11:20 AM CDT -----  Please let patient know, he needs to continue taking b12 injection and folic acid.His cea level for colon cancer is normal at 1.83.  Thank you

## 2020-10-12 NOTE — PROGRESS NOTES
CHIEF COMPLAINT:    Chief Complaint   Patient presents with   • Follow-up     Jacy incisional hernia.       HISTORY OF PRESENT ILLNESS:    Sunny Ernandez is a 80 y.o. male who underwent prior colon resection for colon cancer.  He returns today for follow-up of a known incisional hernia.  He notes no symptoms from the hernia including no obstructive type symptoms as well as no pain.    EXAM:  Vitals:    09/29/20 0911   BP: 104/70   Pulse: 59   Temp: 97.6 °F (36.4 °C)         Abdomen soft, easily reducible incisional hernia noted    ASSESSMENT:    Asymptomatic incisional hernia following colon resection    PLAN:    Overall he appears to be doing well.  Currently his hernia remains asymptomatic.  Follow-up in 6 months.  Signs and symptoms of incarceration of the hernia as well as obstruction were discussed and he understands this would represent an emergency.          This document has been electronically signed by Frantz Shelley MD on October 12, 2020 14:27 CDT

## 2020-12-16 ENCOUNTER — LAB (OUTPATIENT)
Dept: LAB | Facility: HOSPITAL | Age: 80
End: 2020-12-16

## 2020-12-16 ENCOUNTER — HOSPITAL ENCOUNTER (OUTPATIENT)
Dept: CT IMAGING | Facility: HOSPITAL | Age: 80
Discharge: HOME | End: 2020-12-16

## 2020-12-16 DIAGNOSIS — E53.8 B12 DEFICIENCY: ICD-10-CM

## 2020-12-16 DIAGNOSIS — T45.1X5A NEUROPATHY DUE TO CHEMOTHERAPEUTIC DRUG (HCC): ICD-10-CM

## 2020-12-16 DIAGNOSIS — D69.6 THROMBOCYTOPENIA (HCC): ICD-10-CM

## 2020-12-16 DIAGNOSIS — C18.2 MALIGNANT NEOPLASM OF ASCENDING COLON (HCC): ICD-10-CM

## 2020-12-16 DIAGNOSIS — D64.9 ANEMIA, UNSPECIFIED TYPE: ICD-10-CM

## 2020-12-16 DIAGNOSIS — G62.0 NEUROPATHY DUE TO CHEMOTHERAPEUTIC DRUG (HCC): ICD-10-CM

## 2020-12-16 LAB
ALBUMIN SERPL-MCNC: 3.8 G/DL (ref 3.5–5.2)
ALBUMIN/GLOB SERPL: 1.5 G/DL
ALP SERPL-CCNC: 44 U/L (ref 39–117)
ALT SERPL W P-5'-P-CCNC: 12 U/L (ref 1–41)
ANION GAP SERPL CALCULATED.3IONS-SCNC: 9 MMOL/L (ref 5–15)
AST SERPL-CCNC: 17 U/L (ref 1–40)
BASOPHILS # BLD AUTO: 0.05 10*3/MM3 (ref 0–0.2)
BASOPHILS NFR BLD AUTO: 1.4 % (ref 0–1.5)
BILIRUB SERPL-MCNC: 0.7 MG/DL (ref 0–1.2)
BUN SERPL-MCNC: 13 MG/DL (ref 8–23)
BUN/CREAT SERPL: 13.1 (ref 7–25)
CALCIUM SPEC-SCNC: 8.6 MG/DL (ref 8.6–10.5)
CEA SERPL-MCNC: 2.19 NG/ML
CHLORIDE SERPL-SCNC: 107 MMOL/L (ref 98–107)
CO2 SERPL-SCNC: 26 MMOL/L (ref 22–29)
CREAT SERPL-MCNC: 0.99 MG/DL (ref 0.76–1.27)
DEPRECATED RDW RBC AUTO: 44.5 FL (ref 37–54)
EOSINOPHIL # BLD AUTO: 0.22 10*3/MM3 (ref 0–0.4)
EOSINOPHIL NFR BLD AUTO: 6.1 % (ref 0.3–6.2)
ERYTHROCYTE [DISTWIDTH] IN BLOOD BY AUTOMATED COUNT: 13.7 % (ref 12.3–15.4)
FERRITIN SERPL-MCNC: 170.5 NG/ML (ref 30–400)
FOLATE SERPL-MCNC: >20 NG/ML (ref 4.78–24.2)
GFR SERPL CREATININE-BSD FRML MDRD: 73 ML/MIN/1.73
GLOBULIN UR ELPH-MCNC: 2.5 GM/DL
GLUCOSE SERPL-MCNC: 102 MG/DL (ref 65–99)
HCT VFR BLD AUTO: 38.2 % (ref 37.5–51)
HGB BLD-MCNC: 13.1 G/DL (ref 13–17.7)
IMM GRANULOCYTES # BLD AUTO: 0.01 10*3/MM3 (ref 0–0.05)
IMM GRANULOCYTES NFR BLD AUTO: 0.3 % (ref 0–0.5)
IRON 24H UR-MRATE: 72 MCG/DL (ref 59–158)
IRON SATN MFR SERPL: 26 % (ref 20–50)
LYMPHOCYTES # BLD AUTO: 0.91 10*3/MM3 (ref 0.7–3.1)
LYMPHOCYTES NFR BLD AUTO: 25.1 % (ref 19.6–45.3)
MCH RBC QN AUTO: 30 PG (ref 26.6–33)
MCHC RBC AUTO-ENTMCNC: 34.3 G/DL (ref 31.5–35.7)
MCV RBC AUTO: 87.6 FL (ref 79–97)
MONOCYTES # BLD AUTO: 0.32 10*3/MM3 (ref 0.1–0.9)
MONOCYTES NFR BLD AUTO: 8.8 % (ref 5–12)
NEUTROPHILS NFR BLD AUTO: 2.11 10*3/MM3 (ref 1.7–7)
NEUTROPHILS NFR BLD AUTO: 58.3 % (ref 42.7–76)
NRBC BLD AUTO-RTO: 0 /100 WBC (ref 0–0.2)
PLATELET # BLD AUTO: 74 10*3/MM3 (ref 140–450)
PMV BLD AUTO: 11.9 FL (ref 6–12)
POTASSIUM SERPL-SCNC: 4.5 MMOL/L (ref 3.5–5.2)
PROT SERPL-MCNC: 6.3 G/DL (ref 6–8.5)
RBC # BLD AUTO: 4.36 10*6/MM3 (ref 4.14–5.8)
SODIUM SERPL-SCNC: 142 MMOL/L (ref 136–145)
TIBC SERPL-MCNC: 274 MCG/DL (ref 298–536)
TRANSFERRIN SERPL-MCNC: 184 MG/DL (ref 200–360)
VIT B12 BLD-MCNC: >2000 PG/ML (ref 211–946)
WBC # BLD AUTO: 3.62 10*3/MM3 (ref 3.4–10.8)

## 2020-12-16 PROCEDURE — 80053 COMPREHEN METABOLIC PANEL: CPT

## 2020-12-16 PROCEDURE — 85025 COMPLETE CBC W/AUTO DIFF WBC: CPT

## 2020-12-16 PROCEDURE — 74177 CT ABD & PELVIS W/CONTRAST: CPT

## 2020-12-16 PROCEDURE — 83540 ASSAY OF IRON: CPT

## 2020-12-16 PROCEDURE — 25010000002 IOPAMIDOL 61 % SOLUTION: Performed by: INTERNAL MEDICINE

## 2020-12-16 PROCEDURE — 82378 CARCINOEMBRYONIC ANTIGEN: CPT

## 2020-12-16 PROCEDURE — 71260 CT THORAX DX C+: CPT

## 2020-12-16 PROCEDURE — 84466 ASSAY OF TRANSFERRIN: CPT

## 2020-12-16 PROCEDURE — 82746 ASSAY OF FOLIC ACID SERUM: CPT

## 2020-12-16 PROCEDURE — 82728 ASSAY OF FERRITIN: CPT

## 2020-12-16 PROCEDURE — 82607 VITAMIN B-12: CPT

## 2020-12-16 RX ADMIN — IOPAMIDOL 90 ML: 612 INJECTION, SOLUTION INTRAVENOUS at 08:07

## 2020-12-18 ENCOUNTER — OFFICE VISIT (OUTPATIENT)
Dept: ONCOLOGY | Facility: CLINIC | Age: 80
End: 2020-12-18

## 2020-12-18 ENCOUNTER — LAB (OUTPATIENT)
Dept: ONCOLOGY | Facility: HOSPITAL | Age: 80
End: 2020-12-18

## 2020-12-18 VITALS
BODY MASS INDEX: 30.94 KG/M2 | HEART RATE: 68 BPM | DIASTOLIC BLOOD PRESSURE: 87 MMHG | RESPIRATION RATE: 18 BRPM | TEMPERATURE: 97.3 F | SYSTOLIC BLOOD PRESSURE: 170 MMHG | WEIGHT: 221 LBS | HEIGHT: 71 IN

## 2020-12-18 DIAGNOSIS — D69.6 THROMBOCYTOPENIA (HCC): ICD-10-CM

## 2020-12-18 DIAGNOSIS — T45.1X5A NEUROPATHY DUE TO CHEMOTHERAPEUTIC DRUG (HCC): ICD-10-CM

## 2020-12-18 DIAGNOSIS — R93.2 ABNORMAL FINDINGS ON DIAGNOSTIC IMAGING OF GALL BLADDER: ICD-10-CM

## 2020-12-18 DIAGNOSIS — G62.0 NEUROPATHY DUE TO CHEMOTHERAPEUTIC DRUG (HCC): ICD-10-CM

## 2020-12-18 DIAGNOSIS — C18.2 MALIGNANT NEOPLASM OF ASCENDING COLON (HCC): ICD-10-CM

## 2020-12-18 DIAGNOSIS — E53.8 B12 DEFICIENCY: ICD-10-CM

## 2020-12-18 DIAGNOSIS — C18.2 MALIGNANT NEOPLASM OF ASCENDING COLON (HCC): Primary | ICD-10-CM

## 2020-12-18 PROCEDURE — 1123F ACP DISCUSS/DSCN MKR DOCD: CPT | Performed by: INTERNAL MEDICINE

## 2020-12-18 PROCEDURE — G8731 PAIN NEG NO PLAN: HCPCS | Performed by: INTERNAL MEDICINE

## 2020-12-18 PROCEDURE — 99214 OFFICE O/P EST MOD 30 MIN: CPT | Performed by: INTERNAL MEDICINE

## 2020-12-18 PROCEDURE — G9903 PT SCRN TBCO ID AS NON USER: HCPCS | Performed by: INTERNAL MEDICINE

## 2020-12-18 PROCEDURE — G0463 HOSPITAL OUTPT CLINIC VISIT: HCPCS | Performed by: INTERNAL MEDICINE

## 2020-12-18 NOTE — PROGRESS NOTES
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  Denies any bleeding.  Denies any new lymph node enlargement.  Complains of chronic fatigue.  Denies any chest pain or shortness of breath.  Denies any change in bowel habits.            PAST MEDICAL HISTORY:    Past Medical History:   Diagnosis Date   • Colon cancer (CMS/HCC)    • Hernia    • High cholesterol        SOCIAL HISTORY:    Social History     Tobacco Use   • Smoking status: Former Smoker     Packs/day: 1.00     Years: 30.00     Pack years: 30.00     Types: Cigarettes     Quit date:      Years since quittin.9   • Smokeless tobacco: Current User     Types: Chew   Substance Use Topics   • Alcohol use: No   • Drug use: No       Surgical History :  Past Surgical History:   Procedure Laterality Date   • COLON RESECTION     • COLON RESECTION N/A 2018    Procedure: Open Right Colon Resection with repair of incisional hernias;  Surgeon: Frantz Shelley MD;  Location: [facility] OR;  Service: General   • COLON SURGERY     • COLONOSCOPY     • COLONOSCOPY N/A 2018    Procedure: COLONOSCOPY;  Surgeon: Austin Goins DO;  Location: [facility] ENDOSCOPY;  Service: Gastroenterology   • COLONOSCOPY N/A 4/15/2019    Procedure: COLONOSCOPY;  Surgeon: Austin Goins DO;  Location: [facility] ENDOSCOPY;  Service: Gastroenterology   • ENDOSCOPY N/A 2018    Procedure: ESOPHAGOGASTRODUODENOSCOPY;  Surgeon: Austin Goins DO;  Location: [facility] ENDOSCOPY;  Service: Gastroenterology  "  • FINGER SURGERY         ALLERGIES:    No Known Allergies      FAMILY HISTORY:  Family History   Problem Relation Age of Onset   • Lung cancer Mother            REVIEW OF SYSTEMS:      CONSTITUTIONAL:  Complains of fatigue.  Has gained 8 pounds since last clinic visit.  Denies any fever, chills .    EYES: No visual disturbances. No discharge. No new lesions    ENMT:  No epistaxis, mouth sores or difficulty swallowing.    RESPIRATORY:  No new shortness of breath. No new cough or hemoptysis.    CARDIOVASCULAR:  No chest pain or palpitations.    GASTROINTESTINAL:  No abdominal pain nausea, vomiting or blood in the stool.    GENITOURINARY: No Dysuria or Hematuria.    MUSCULOSKELETAL: Positive for chronic arthritis pain    LYMPHATICS:  Denies any abnormal swollen glands anywhere in the body.    NEUROLOGICAL : Positive for chronic tingling and numbness affecting bilateral hand  And feet, denies any recent worsening. No headache or dizziness. No seizures or balance problems.    SKIN: No new skin lesions.    ENDOCRINE : No new hot or cold intolerance. No new polyuria . No polydipsia.        PHYSICAL EXAMINATION:      VITAL SIGNS:  /87   Pulse 68   Temp 97.3 °F (36.3 °C)   Resp 18   Ht 180.3 cm (71\")   Wt 100 kg (221 lb)   BMI 30.82 kg/m²       12/18/20  1120   Weight: 100 kg (221 lb)         ECOG performance status: 2    CONSTITUTIONAL:  Not in any distress.    EYES: Mild conjunctival Pallor. No Icterus. No Pterygium. Extraocular Movements intact.No ptosis.    ENMT:  Normocephalic, Atraumatic.No Facial Asymmetry noted.    NECK:  No adenopathy.Trachea midline. NO JVD.    RESPIRATORY:  Fair air entry bilateral. No rhonchi or wheezing.Fair respiratory effort.    CARDIOVASCULAR:  S1, S2. Regular rate and rhythm. No murmur or gallop appreciated.    ABDOMEN:  Soft, obese, nontender. Bowel sounds present in all four quadrants.  No Hepatosplenomegaly appreciated.  Surgical scar present.    MUSCULOSKELETAL:  No " edema.No Calf Tenderness.Decreased range of motion.    NEUROLOGIC:    No  Motor  deficit appreciated. Cranial Nerves 2-12 grossly intact.    SKIN : No new skin lesion identified. Skin is warm and moist to touch.    LYMPHATICS: No new enlarged lymph nodes in neck or supraclavicular area.    PSYCHIATRY: Alert, awake and oriented ×3.Normal affect.  Normal judgment.  Makes good eye contact.        DIAGNOSTIC DATA:    Glucose   Date Value Ref Range Status   12/16/2020 102 (H) 65 - 99 mg/dL Final     Sodium   Date Value Ref Range Status   12/16/2020 142 136 - 145 mmol/L Final   04/05/2018 139 136 - 145 mmol/L Final     Potassium   Date Value Ref Range Status   12/16/2020 4.5 3.5 - 5.2 mmol/L Final   04/05/2018 4.1 3.3 - 5.0 mmol/L Final     CO2   Date Value Ref Range Status   12/16/2020 26.0 22.0 - 29.0 mmol/L Final     Total CO2   Date Value Ref Range Status   04/05/2018 25 20 - 31 mmol/L Final     Chloride   Date Value Ref Range Status   12/16/2020 107 98 - 107 mmol/L Final   04/05/2018 106 99 - 111 mmol/L Final     Anion Gap   Date Value Ref Range Status   12/16/2020 9.0 5.0 - 15.0 mmol/L Final     Creatinine   Date Value Ref Range Status   12/16/2020 0.99 0.76 - 1.27 mg/dL Final   04/05/2018 1.0 0.6 - 1.3 mg/dL Final     BUN   Date Value Ref Range Status   12/16/2020 13 8 - 23 mg/dL Final   04/05/2018 11 7.0 - 18.0 mg/dL Final     BUN/Creatinine Ratio   Date Value Ref Range Status   12/16/2020 13.1 7.0 - 25.0 Final     Calcium   Date Value Ref Range Status   12/16/2020 8.6 8.6 - 10.5 mg/dL Final   04/05/2018 8.5 8.1 - 10.2 mg/dL Final     eGFR Non  Amer   Date Value Ref Range Status   12/16/2020 73 >60 mL/min/1.73 Final     Alkaline Phosphatase   Date Value Ref Range Status   12/16/2020 44 39 - 117 U/L Final   04/05/2018 55 30 - 120 IU/L Final     Total Protein   Date Value Ref Range Status   12/16/2020 6.3 6.0 - 8.5 g/dL Final     ALT (SGPT)   Date Value Ref Range Status   12/16/2020 12 1 - 41 U/L Final    04/05/2018 10 (L) 17 - 63 IU/L Final     AST (SGOT)   Date Value Ref Range Status   12/16/2020 17 1 - 40 U/L Final   04/05/2018 17 15 - 41 IU/L Final     Total Bilirubin   Date Value Ref Range Status   12/16/2020 0.7 0.0 - 1.2 mg/dL Final   04/05/2018 0.42 0 - 1.50 mg/dL Final     Albumin   Date Value Ref Range Status   12/16/2020 3.80 3.50 - 5.20 g/dL Final   04/05/2018 3.8 3.4 - 5.0 g/dl Final     Globulin   Date Value Ref Range Status   12/16/2020 2.5 gm/dL Final     Lab Results   Component Value Date    WBC 3.62 12/16/2020    HGB 13.1 12/16/2020    HCT 38.2 12/16/2020    MCV 87.6 12/16/2020    PLT 74 (L) 12/16/2020     Lab Results   Component Value Date    NEUTROABS 2.11 12/16/2020    IRON 72 12/16/2020    TIBC 274 (L) 12/16/2020    LABIRON 26 12/16/2020    FERRITIN 170.50 12/16/2020    [id number] >2,000 (H) 12/16/2020    FOLATE >20.00 12/16/2020     Lab Results   Component Value Date    CEA 2.19 12/16/2020           PATHOLOGY:  * Cannot find OR log *         RADIOLOGY DATA :  Ct Chest With Contrast    Result Date: 12/16/2020  1. No radiographic evidence of local recurrence or distant metastasis in this patient with given history of colon cancer. 2. Postsurgical changes as above. 3. Cholelithiasis with pericholecystic fluid concerning for possible acute cholecystitis. Clinical correlation is recommended. 4. Stable nonacute findings detailed in the body of the report. Electronically signed by:  Reena Castro MD  12/16/2020 1:27 PM CST Workstation: [id number]    Ct Abdomen Pelvis With Contrast    Result Date: 12/16/2020  1. No radiographic evidence of local recurrence or distant metastasis in this patient with given history of colon cancer. 2. Postsurgical changes as above. 3. Cholelithiasis with pericholecystic fluid concerning for possible acute cholecystitis. Clinical correlation is recommended. 4. Stable nonacute findings detailed in the body of the report. Electronically signed by:  Reena Castro MD   12/16/2020 1:27 PM [facility] Workstation: [id number]        ASSESSMENT AND PLAN:      1.  Adenocarcinoma of cecum, stage II, T3 N0 M0, 22 lymph node negative at time of diagnosis in April 2018  -Patient has been on surveillance since his surgery.  -Tumor marker done on December 16, 2020 is normal at 0.19  -CT of chest, abdomen and pelvis with contrast done on December 16, 2020 does not show any evidence of recurrence or distant metastasis.  Result of CT scan were discussed with patient  -Had a colonoscopy done in April 2019 by Dr. Goins which was negative for malignancy.  The next recommended colonoscopy is in April 2022  -We will ask patient to return to clinic in 3 months with repeat CBC, CMP, CEA, anemia work-up to be done on that day.  -Next surveillance CT of chest abdomen and pelvis with contrast will be done in December 2021.    2.  Adenocarcinoma of transverse colon, stage III, T3 N1 M0 diagnosed in July 2020  -Had surgery followed by [facility] that completed in March 2013.  We will continue with clinical surveillance    3.  Anemia:  -Hemoglobin is 13.1.  -Recommend continue with monthly B12 injection with folic acid 1 mg p.o. daily  -We will repeat anemia work-up upon next clinic visit in 3 months    4.  Thrombocytopenia:  -Likely due to splenomegaly.  Platelet count is 74,000 today.  Will monitor with CBC    5.  Vitamin B12 deficiency:  -Remains on monthly B12 injection at home    6.  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  Health maintenance: Patient does not smoke.  Had a colonoscopy in April 2019 by Dr. Goins    8. Advance Care Planning: For now patient remains full code and is able to make decisions.  Patient has health care surrogate mentioned on chart.    9.  Gallstones with possible cholecystitis:  -CT scan is showing gallstones with possible changes of cholecystitis.  Patient was notified about CT scan finding.  Patient denies any abdominal pain any nausea or vomiting.  He was offered to be  referred to surgical team for further evaluation of abnormal CT scan of gallbladder.  He does not want to have surgery follow-up at present.  Plan  -he was instructed to come to the emergency room if he starts having any abdominal pain or any nausea or vomiting after food.           PHQ-9 Total Score: 0   -Patient is not homicidal or suicidal.  No acute intervention required.    Sunny Ernandez reports a pain score of 0.  Given his pain assessment as noted, treatment options were discussed and the following options were decided upon as a follow-up plan to address the patient's pain: No acute intervention required.         Dany Baig MD  12/18/2020  11:41 CST        Part of this note may be an electronic transcription/translation of spoken language to printed text using the Dragon Dictation System.

## 2020-12-21 ENCOUNTER — TELEPHONE (OUTPATIENT)
Dept: ONCOLOGY | Facility: CLINIC | Age: 80
End: 2020-12-21

## 2020-12-21 NOTE — TELEPHONE ENCOUNTER
PT: SELF    PT CALLING ABOUT SOME ?'S HE HAS FROM THE APPT ON 12/18. PLEASE ADVISE    PT #: [phone]

## 2020-12-21 NOTE — TELEPHONE ENCOUNTER
Spoke with patient - he said he did not have any questions at this time.   Informed him if he needed anything to please call us back.   He verbalized understanding.

## 2021-01-25 RX ORDER — FOLIC ACID 1 MG/1
TABLET ORAL
Qty: 90 TABLET | Refills: 0 | Status: SHIPPED | OUTPATIENT
Start: 2021-01-25 | End: 2021-04-26

## 2021-02-22 ENCOUNTER — IMMUNIZATION (OUTPATIENT)
Dept: VACCINE CLINIC | Facility: HOSPITAL | Age: 81
End: 2021-02-22

## 2021-02-22 PROCEDURE — 0001A: CPT | Performed by: THORACIC SURGERY (CARDIOTHORACIC VASCULAR SURGERY)

## 2021-02-22 PROCEDURE — 91300 HC SARSCOV02 VAC 30MCG/0.3ML IM: CPT | Performed by: THORACIC SURGERY (CARDIOTHORACIC VASCULAR SURGERY)

## 2021-03-15 ENCOUNTER — IMMUNIZATION (OUTPATIENT)
Dept: VACCINE CLINIC | Facility: HOSPITAL | Age: 81
End: 2021-03-15

## 2021-03-15 PROCEDURE — 91300 HC SARSCOV02 VAC 30MCG/0.3ML IM: CPT | Performed by: THORACIC SURGERY (CARDIOTHORACIC VASCULAR SURGERY)

## 2021-03-15 PROCEDURE — 0002A: CPT | Performed by: THORACIC SURGERY (CARDIOTHORACIC VASCULAR SURGERY)

## 2021-04-09 ENCOUNTER — OFFICE VISIT (OUTPATIENT)
Dept: ONCOLOGY | Facility: CLINIC | Age: 81
End: 2021-04-09

## 2021-04-09 ENCOUNTER — LAB (OUTPATIENT)
Dept: ONCOLOGY | Facility: HOSPITAL | Age: 81
End: 2021-04-09

## 2021-04-09 VITALS
RESPIRATION RATE: 18 BRPM | SYSTOLIC BLOOD PRESSURE: 142 MMHG | TEMPERATURE: 98.4 F | HEIGHT: 71 IN | HEART RATE: 77 BPM | WEIGHT: 230.6 LBS | DIASTOLIC BLOOD PRESSURE: 88 MMHG | BODY MASS INDEX: 32.28 KG/M2

## 2021-04-09 DIAGNOSIS — T45.1X5A NEUROPATHY DUE TO CHEMOTHERAPEUTIC DRUG (HCC): ICD-10-CM

## 2021-04-09 DIAGNOSIS — G62.0 NEUROPATHY DUE TO CHEMOTHERAPEUTIC DRUG (HCC): ICD-10-CM

## 2021-04-09 DIAGNOSIS — C18.2 MALIGNANT NEOPLASM OF ASCENDING COLON (HCC): ICD-10-CM

## 2021-04-09 DIAGNOSIS — D69.6 THROMBOCYTOPENIA (HCC): ICD-10-CM

## 2021-04-09 DIAGNOSIS — E53.8 B12 DEFICIENCY: ICD-10-CM

## 2021-04-09 DIAGNOSIS — C18.2 MALIGNANT NEOPLASM OF ASCENDING COLON (HCC): Primary | ICD-10-CM

## 2021-04-09 DIAGNOSIS — D64.9 ANEMIA, UNSPECIFIED TYPE: ICD-10-CM

## 2021-04-09 LAB
ALBUMIN SERPL-MCNC: 3.8 G/DL (ref 3.5–5.2)
ALBUMIN/GLOB SERPL: 1.4 G/DL
ALP SERPL-CCNC: 52 U/L (ref 39–117)
ALT SERPL W P-5'-P-CCNC: 12 U/L (ref 1–41)
ANION GAP SERPL CALCULATED.3IONS-SCNC: 8 MMOL/L (ref 5–15)
AST SERPL-CCNC: 23 U/L (ref 1–40)
BILIRUB SERPL-MCNC: 1.3 MG/DL (ref 0–1.2)
BUN SERPL-MCNC: 17 MG/DL (ref 8–23)
BUN/CREAT SERPL: 17.5 (ref 7–25)
CALCIUM SPEC-SCNC: 8.7 MG/DL (ref 8.6–10.5)
CEA SERPL-MCNC: 2.1 NG/ML
CHLORIDE SERPL-SCNC: 105 MMOL/L (ref 98–107)
CO2 SERPL-SCNC: 24 MMOL/L (ref 22–29)
CREAT SERPL-MCNC: 0.97 MG/DL (ref 0.76–1.27)
DEPRECATED RDW RBC AUTO: 42.7 FL (ref 37–54)
EOSINOPHIL # BLD MANUAL: 0.16 10*3/MM3 (ref 0–0.4)
EOSINOPHIL NFR BLD MANUAL: 4 % (ref 0.3–6.2)
ERYTHROCYTE [DISTWIDTH] IN BLOOD BY AUTOMATED COUNT: 13.3 % (ref 12.3–15.4)
FERRITIN SERPL-MCNC: 246.2 NG/ML (ref 30–400)
FOLATE SERPL-MCNC: >20 NG/ML (ref 4.78–24.2)
GFR SERPL CREATININE-BSD FRML MDRD: 74 ML/MIN/1.73
GLOBULIN UR ELPH-MCNC: 2.8 GM/DL
GLUCOSE SERPL-MCNC: 97 MG/DL (ref 65–99)
HCT VFR BLD AUTO: 38.4 % (ref 37.5–51)
HGB BLD-MCNC: 13.1 G/DL (ref 13–17.7)
IRON 24H UR-MRATE: 106 MCG/DL (ref 59–158)
IRON SATN MFR SERPL: 38 % (ref 20–50)
LYMPHOCYTES # BLD MANUAL: 0.95 10*3/MM3 (ref 0.7–3.1)
LYMPHOCYTES NFR BLD MANUAL: 24 % (ref 19.6–45.3)
LYMPHOCYTES NFR BLD MANUAL: 9 % (ref 5–12)
MCH RBC QN AUTO: 29.8 PG (ref 26.6–33)
MCHC RBC AUTO-ENTMCNC: 34.1 G/DL (ref 31.5–35.7)
MCV RBC AUTO: 87.5 FL (ref 79–97)
MONOCYTES # BLD AUTO: 0.35 10*3/MM3 (ref 0.1–0.9)
NEUTROPHILS # BLD AUTO: 2.48 10*3/MM3 (ref 1.7–7)
NEUTROPHILS NFR BLD MANUAL: 63 % (ref 42.7–76)
PLATELET # BLD AUTO: 77 10*3/MM3 (ref 140–450)
PMV BLD AUTO: 11.8 FL (ref 6–12)
POTASSIUM SERPL-SCNC: 4.1 MMOL/L (ref 3.5–5.2)
PROT SERPL-MCNC: 6.6 G/DL (ref 6–8.5)
RBC # BLD AUTO: 4.39 10*6/MM3 (ref 4.14–5.8)
RBC MORPH BLD: NORMAL
SMALL PLATELETS BLD QL SMEAR: NORMAL
SODIUM SERPL-SCNC: 137 MMOL/L (ref 136–145)
TIBC SERPL-MCNC: 282 MCG/DL (ref 298–536)
TRANSFERRIN SERPL-MCNC: 189 MG/DL (ref 200–360)
VIT B12 BLD-MCNC: 632 PG/ML (ref 211–946)
WBC # BLD AUTO: 3.94 10*3/MM3 (ref 3.4–10.8)
WBC MORPH BLD: NORMAL

## 2021-04-09 PROCEDURE — 99214 OFFICE O/P EST MOD 30 MIN: CPT | Performed by: INTERNAL MEDICINE

## 2021-04-09 PROCEDURE — 1123F ACP DISCUSS/DSCN MKR DOCD: CPT | Performed by: INTERNAL MEDICINE

## 2021-04-09 PROCEDURE — G9903 PT SCRN TBCO ID AS NON USER: HCPCS | Performed by: INTERNAL MEDICINE

## 2021-04-09 PROCEDURE — 85007 BL SMEAR W/DIFF WBC COUNT: CPT

## 2021-04-09 PROCEDURE — 83540 ASSAY OF IRON: CPT

## 2021-04-09 PROCEDURE — 80053 COMPREHEN METABOLIC PANEL: CPT

## 2021-04-09 PROCEDURE — 82728 ASSAY OF FERRITIN: CPT

## 2021-04-09 PROCEDURE — 82607 VITAMIN B-12: CPT

## 2021-04-09 PROCEDURE — 1126F AMNT PAIN NOTED NONE PRSNT: CPT | Performed by: INTERNAL MEDICINE

## 2021-04-09 PROCEDURE — 82378 CARCINOEMBRYONIC ANTIGEN: CPT

## 2021-04-09 PROCEDURE — 82746 ASSAY OF FOLIC ACID SERUM: CPT

## 2021-04-09 PROCEDURE — G0463 HOSPITAL OUTPT CLINIC VISIT: HCPCS | Performed by: INTERNAL MEDICINE

## 2021-04-09 PROCEDURE — 84466 ASSAY OF TRANSFERRIN: CPT

## 2021-04-09 PROCEDURE — 85025 COMPLETE CBC W/AUTO DIFF WBC: CPT

## 2021-04-09 NOTE — PROGRESS NOTES
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  Complains of chronic neuropathy affecting upper and lower extremity.  Denies any bleeding.  Denies any new lymph node enlargement.  Denies any new chest pain or shortness of breath.  Denies any change in bowel habit.          Past Medical History, Past Surgical History, Social History, Family History have been reviewed and are without significant changes except as mentioned.    Review of Systems   Constitutional: Positive for fatigue.        Has gained 11 pounds since last clinic visit   Musculoskeletal: Positive for arthralgias.   Neurological: Positive for numbness (Positive for chronic tingling and numbness affecting bilateral hand and feet, denies any recent worsening).   Hematological: Negative for adenopathy.      A comprehensive 14 point review of systems was performed and was negative except as mentioned.    Medications:  The current medication list was reviewed in the EMR    ALLERGIES:  No Known Allergies    Objective      Vitals:    04/09/21 0955   BP: 142/88   Pulse: 77   Resp: 18   Temp: 98.4 °F (36.9 °C)   TempSrc: Temporal   Weight: 105 kg (230 lb 9.6 oz)   Height: 180.3 cm (70.98\")   PainSc: 0-No pain     Current Status 4/9/2021   ECOG score 1       Physical Exam  Cardiovascular:      Rate and Rhythm: Normal rate and regular rhythm.      Heart sounds: Normal heart sounds.   Abdominal:      General: Bowel sounds are normal.      Palpations: Abdomen is soft.      Tenderness: There is no " abdominal tenderness.   Neurological:      Mental Status: He is alert and oriented to person, place, and time.           RECENT LABS:  Glucose   Date Value Ref Range Status   04/09/2021 97 65 - 99 mg/dL Final     Sodium   Date Value Ref Range Status   04/09/2021 137 136 - 145 mmol/L Final   04/05/2018 139 136 - 145 mmol/L Final     Potassium   Date Value Ref Range Status   04/09/2021 4.1 3.5 - 5.2 mmol/L Final   04/05/2018 4.1 3.3 - 5.0 mmol/L Final     CO2   Date Value Ref Range Status   04/09/2021 24.0 22.0 - 29.0 mmol/L Final     Total CO2   Date Value Ref Range Status   04/05/2018 25 20 - 31 mmol/L Final     Chloride   Date Value Ref Range Status   04/09/2021 105 98 - 107 mmol/L Final   04/05/2018 106 99 - 111 mmol/L Final     Anion Gap   Date Value Ref Range Status   04/09/2021 8.0 5.0 - 15.0 mmol/L Final     Creatinine   Date Value Ref Range Status   04/09/2021 0.97 0.76 - 1.27 mg/dL Final   04/05/2018 1.0 0.6 - 1.3 mg/dL Final     BUN   Date Value Ref Range Status   04/09/2021 17 8 - 23 mg/dL Final   04/05/2018 11 7.0 - 18.0 mg/dL Final     BUN/Creatinine Ratio   Date Value Ref Range Status   04/09/2021 17.5 7.0 - 25.0 Final     Calcium   Date Value Ref Range Status   04/09/2021 8.7 8.6 - 10.5 mg/dL Final   04/05/2018 8.5 8.1 - 10.2 mg/dL Final     eGFR Non  Amer   Date Value Ref Range Status   04/09/2021 74 >60 mL/min/1.73 Final     Alkaline Phosphatase   Date Value Ref Range Status   04/09/2021 52 39 - 117 U/L Final   04/05/2018 55 30 - 120 IU/L Final     Total Protein   Date Value Ref Range Status   04/09/2021 6.6 6.0 - 8.5 g/dL Final     ALT (SGPT)   Date Value Ref Range Status   04/09/2021 12 1 - 41 U/L Final   04/05/2018 10 (L) 17 - 63 IU/L Final     AST (SGOT)   Date Value Ref Range Status   04/09/2021 23 1 - 40 U/L Final   04/05/2018 17 15 - 41 IU/L Final     Total Bilirubin   Date Value Ref Range Status   04/09/2021 1.3 (H) 0.0 - 1.2 mg/dL Final   04/05/2018 0.42 0 - 1.50 mg/dL Final      Albumin   Date Value Ref Range Status   04/09/2021 3.80 3.50 - 5.20 g/dL Final   04/05/2018 3.8 3.4 - 5.0 g/dl Final     Globulin   Date Value Ref Range Status   04/09/2021 2.8 gm/dL Final     Lab Results   Component Value Date    WBC 3.94 04/09/2021    HGB 13.1 04/09/2021    HCT 38.4 04/09/2021    MCV 87.5 04/09/2021    PLT 77 (L) 04/09/2021     Lab Results   Component Value Date    NEUTROABS 2.48 04/09/2021    IRON 106 04/09/2021    IRON 72 12/16/2020    IRON 70 09/25/2020    TIBC 282 (L) 04/09/2021    TIBC 274 (L) 12/16/2020    TIBC 271 (L) 09/25/2020    LABIRON 38 04/09/2021    LABIRON 26 12/16/2020    LABIRON 26 09/25/2020    FERRITIN 246.20 04/09/2021    FERRITIN 170.50 12/16/2020    FERRITIN 229.20 09/25/2020    [id number] >2,000 (H) 12/16/2020    [id number] 816 09/25/2020    GELOKGBX10 794 06/26/2020    FOLATE >20.00 12/16/2020    FOLATE >20.00 09/25/2020    FOLATE >20.00 06/26/2020     Lab Results   Component Value Date    CEA 2.19 12/16/2020         PATHOLOGY:  * Cannot find OR log *         RADIOLOGY DATA :  No radiology results for the last 7 days        Assessment/Plan     1.  Adenocarcinoma of cecum, stage II, T3 N0 M0, 22 lymph node negative at the time of diagnosis in April 2018  -Patient has been on surveillance since surgery  -Tumor marker done with CEA today on April 9, 2021 shows  -Had a colonoscopy in April 2019 by Dr. Goins which was negative for malignancy next colonoscopy is recommended for April 2022  -We will ask patient to return to clinic in 3 months with repeat CBC, CMP, CEA, iron studies, ferritin, B12, folate to be done on that day  -Next surveillance CT of chest abdomen and pelvis with contrast will be done in December 2021    2.  Adenocarcinoma of transverse colon stage III T3 N1 M0 diagnosed in July 2020  -Had a surgery followed by [facility] that completed in March 2013.  We will continue with clinical surveillance    3.  Anemia:  -Hemoglobin is 13.1.  -Recommend continue with  monthly B12 injection and folic acid 1 mg p.o. daily.  Iron studies are adequate.  -We will repeat anemia work-up upon next clinic visit in 3 months    4.  Thrombocytopenia:  -Secondary to splenomegaly plus or minus ITP.  -Platelet count is 77,000 today.  Patient denies any bleeding  -We will monitor with CBC    5.  Vitamin B12 deficiency:  -Remains on monthly B12 injection at home    6.  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  Health maintenance: Patient does not smoke.  Had a colonoscopy in April 2019 by Dr. Goins    8. Advance Care Planning: For now patient remains full code and is able to make decisions.  Patient has health care surrogate mentioned on chart.                 PHQ-9 Total Score: 0   -Patient is not homicidal or suicidal.  No acute intervention required.    Sunny Ernandez reports a pain score of 0.  Given his pain assessment as noted, treatment options were discussed and the following options were decided upon as a follow-up plan to address the patient's pain: No acute intervention required.         Dany Baig MD  4/9/2021  11:58 CDT        Part of this note may be an electronic transcription/translation of spoken language to printed text using the Dragon Dictation System.          CC:

## 2021-04-13 ENCOUNTER — TELEPHONE (OUTPATIENT)
Dept: ONCOLOGY | Facility: HOSPITAL | Age: 81
End: 2021-04-13

## 2021-04-13 NOTE — TELEPHONE ENCOUNTER
----- Message from Dany Baig MD sent at 4/10/2021 10:29 AM CDT -----  Please let patient know, his iron level is adequate.  No need to start any iron replacement at present.  His B12 level is 632.  Recommend continue with monthly B12 injection.  Recommend continue with folic acid 1 mg p.o. daily.  Colon cancer-CEA level is normal at 2.10.  Thank you

## 2021-04-13 NOTE — TELEPHONE ENCOUNTER
Called and spoke with pt regarding lab results and taking medications as ordered by Dr. Baig, v/u obtained.

## 2021-04-23 ENCOUNTER — OFFICE VISIT (OUTPATIENT)
Dept: SURGERY | Facility: CLINIC | Age: 81
End: 2021-04-23

## 2021-04-23 VITALS
DIASTOLIC BLOOD PRESSURE: 82 MMHG | TEMPERATURE: 97 F | HEART RATE: 79 BPM | SYSTOLIC BLOOD PRESSURE: 136 MMHG | WEIGHT: 227 LBS | HEIGHT: 71 IN | BODY MASS INDEX: 31.78 KG/M2

## 2021-04-23 DIAGNOSIS — C18.2 MALIGNANT NEOPLASM OF ASCENDING COLON (HCC): ICD-10-CM

## 2021-04-23 DIAGNOSIS — Z09 FOLLOW UP: Primary | ICD-10-CM

## 2021-04-23 PROCEDURE — 99213 OFFICE O/P EST LOW 20 MIN: CPT | Performed by: SURGERY

## 2021-04-23 NOTE — PROGRESS NOTES
CHIEF COMPLAINT:    Chief Complaint   Patient presents with   • Follow-up     6 Mos. Follow-up, Recheck Incisional hernia       HISTORY OF PRESENT ILLNESS:    Sunny Ernandez is a 81 y.o. male who underwent prior colon resection and has a known incisional hernia.  His CEA has been stable. His Hgb is stable. He has no abdominal pain. He is eating and drinking well.  He has no complaints today.    EXAM:  Vitals:    04/23/21 1002   BP: 136/82   Pulse: 79   Temp: 97 °F (36.1 °C)         Abdomen soft, large hernia with bowel present    ASSESSMENT:    Incisional hernia following colon resection for cancer.     PLAN:    Overall doing well, will see in one year.          This document has been electronically signed by Frantz Shelley MD on April 23, 2021 10:26 CDT

## 2021-04-26 RX ORDER — FOLIC ACID 1 MG/1
TABLET ORAL
Qty: 90 TABLET | Refills: 0 | Status: SHIPPED | OUTPATIENT
Start: 2021-04-26 | End: 2021-07-26

## 2021-07-09 ENCOUNTER — LAB (OUTPATIENT)
Dept: ONCOLOGY | Facility: HOSPITAL | Age: 81
End: 2021-07-09

## 2021-07-09 ENCOUNTER — OFFICE VISIT (OUTPATIENT)
Dept: ONCOLOGY | Facility: CLINIC | Age: 81
End: 2021-07-09

## 2021-07-09 VITALS
RESPIRATION RATE: 18 BRPM | SYSTOLIC BLOOD PRESSURE: 126 MMHG | OXYGEN SATURATION: 95 % | WEIGHT: 226.9 LBS | HEART RATE: 55 BPM | TEMPERATURE: 97.7 F | BODY MASS INDEX: 31.77 KG/M2 | HEIGHT: 71 IN | DIASTOLIC BLOOD PRESSURE: 70 MMHG

## 2021-07-09 DIAGNOSIS — D69.6 THROMBOCYTOPENIA (HCC): ICD-10-CM

## 2021-07-09 DIAGNOSIS — E53.8 B12 DEFICIENCY: ICD-10-CM

## 2021-07-09 DIAGNOSIS — E53.8 B12 DEFICIENCY: Chronic | ICD-10-CM

## 2021-07-09 DIAGNOSIS — G62.0 NEUROPATHY DUE TO CHEMOTHERAPEUTIC DRUG (HCC): Chronic | ICD-10-CM

## 2021-07-09 DIAGNOSIS — C18.2 MALIGNANT NEOPLASM OF ASCENDING COLON (HCC): ICD-10-CM

## 2021-07-09 DIAGNOSIS — T45.1X5A NEUROPATHY DUE TO CHEMOTHERAPEUTIC DRUG (HCC): ICD-10-CM

## 2021-07-09 DIAGNOSIS — D64.9 ANEMIA, UNSPECIFIED TYPE: ICD-10-CM

## 2021-07-09 DIAGNOSIS — T45.1X5A NEUROPATHY DUE TO CHEMOTHERAPEUTIC DRUG (HCC): Chronic | ICD-10-CM

## 2021-07-09 DIAGNOSIS — D69.6 THROMBOCYTOPENIA (HCC): Chronic | ICD-10-CM

## 2021-07-09 DIAGNOSIS — D64.9 ANEMIA, UNSPECIFIED TYPE: Primary | Chronic | ICD-10-CM

## 2021-07-09 DIAGNOSIS — G62.0 NEUROPATHY DUE TO CHEMOTHERAPEUTIC DRUG (HCC): ICD-10-CM

## 2021-07-09 LAB
ALBUMIN SERPL-MCNC: 4 G/DL (ref 3.5–5.2)
ALBUMIN/GLOB SERPL: 1.7 G/DL
ALP SERPL-CCNC: 47 U/L (ref 39–117)
ALT SERPL W P-5'-P-CCNC: 9 U/L (ref 1–41)
ANION GAP SERPL CALCULATED.3IONS-SCNC: 5 MMOL/L (ref 5–15)
AST SERPL-CCNC: 18 U/L (ref 1–40)
BASOPHILS # BLD AUTO: 0.05 10*3/MM3 (ref 0–0.2)
BASOPHILS NFR BLD AUTO: 1.3 % (ref 0–1.5)
BILIRUB SERPL-MCNC: 1.1 MG/DL (ref 0–1.2)
BUN SERPL-MCNC: 16 MG/DL (ref 8–23)
BUN/CREAT SERPL: 14.8 (ref 7–25)
CALCIUM SPEC-SCNC: 8.8 MG/DL (ref 8.6–10.5)
CEA SERPL-MCNC: 2.26 NG/ML
CHLORIDE SERPL-SCNC: 102 MMOL/L (ref 98–107)
CO2 SERPL-SCNC: 25 MMOL/L (ref 22–29)
CREAT SERPL-MCNC: 1.08 MG/DL (ref 0.76–1.27)
DEPRECATED RDW RBC AUTO: 43.8 FL (ref 37–54)
EOSINOPHIL # BLD AUTO: 0.12 10*3/MM3 (ref 0–0.4)
EOSINOPHIL NFR BLD AUTO: 3.2 % (ref 0.3–6.2)
ERYTHROCYTE [DISTWIDTH] IN BLOOD BY AUTOMATED COUNT: 13.6 % (ref 12.3–15.4)
FERRITIN SERPL-MCNC: 237 NG/ML (ref 30–400)
FOLATE SERPL-MCNC: >20 NG/ML (ref 4.78–24.2)
GFR SERPL CREATININE-BSD FRML MDRD: 66 ML/MIN/1.73
GLOBULIN UR ELPH-MCNC: 2.4 GM/DL
GLUCOSE SERPL-MCNC: 97 MG/DL (ref 65–99)
HCT VFR BLD AUTO: 36.8 % (ref 37.5–51)
HGB BLD-MCNC: 12.5 G/DL (ref 13–17.7)
HOLD SPECIMEN: NORMAL
IMM GRANULOCYTES # BLD AUTO: 0.02 10*3/MM3 (ref 0–0.05)
IMM GRANULOCYTES NFR BLD AUTO: 0.5 % (ref 0–0.5)
IRON 24H UR-MRATE: 75 MCG/DL (ref 59–158)
IRON SATN MFR SERPL: 28 % (ref 20–50)
LYMPHOCYTES # BLD AUTO: 0.82 10*3/MM3 (ref 0.7–3.1)
LYMPHOCYTES NFR BLD AUTO: 21.9 % (ref 19.6–45.3)
MCH RBC QN AUTO: 29.9 PG (ref 26.6–33)
MCHC RBC AUTO-ENTMCNC: 34 G/DL (ref 31.5–35.7)
MCV RBC AUTO: 88 FL (ref 79–97)
MONOCYTES # BLD AUTO: 0.35 10*3/MM3 (ref 0.1–0.9)
MONOCYTES NFR BLD AUTO: 9.4 % (ref 5–12)
NEUTROPHILS NFR BLD AUTO: 2.38 10*3/MM3 (ref 1.7–7)
NEUTROPHILS NFR BLD AUTO: 63.7 % (ref 42.7–76)
NRBC BLD AUTO-RTO: 0 /100 WBC (ref 0–0.2)
PLATELET # BLD AUTO: 74 10*3/MM3 (ref 140–450)
PMV BLD AUTO: 12.2 FL (ref 6–12)
POTASSIUM SERPL-SCNC: 4.4 MMOL/L (ref 3.5–5.2)
PROT SERPL-MCNC: 6.4 G/DL (ref 6–8.5)
RBC # BLD AUTO: 4.18 10*6/MM3 (ref 4.14–5.8)
SODIUM SERPL-SCNC: 132 MMOL/L (ref 136–145)
TIBC SERPL-MCNC: 267 MCG/DL (ref 298–536)
TRANSFERRIN SERPL-MCNC: 179 MG/DL (ref 200–360)
VIT B12 BLD-MCNC: 612 PG/ML (ref 211–946)
WBC # BLD AUTO: 3.74 10*3/MM3 (ref 3.4–10.8)

## 2021-07-09 PROCEDURE — 99214 OFFICE O/P EST MOD 30 MIN: CPT | Performed by: INTERNAL MEDICINE

## 2021-07-09 PROCEDURE — 1126F AMNT PAIN NOTED NONE PRSNT: CPT | Performed by: INTERNAL MEDICINE

## 2021-07-09 PROCEDURE — G0463 HOSPITAL OUTPT CLINIC VISIT: HCPCS | Performed by: INTERNAL MEDICINE

## 2021-07-09 PROCEDURE — 82378 CARCINOEMBRYONIC ANTIGEN: CPT

## 2021-07-09 PROCEDURE — 82607 VITAMIN B-12: CPT

## 2021-07-09 PROCEDURE — 85025 COMPLETE CBC W/AUTO DIFF WBC: CPT

## 2021-07-09 PROCEDURE — 82746 ASSAY OF FOLIC ACID SERUM: CPT

## 2021-07-09 PROCEDURE — 83540 ASSAY OF IRON: CPT

## 2021-07-09 PROCEDURE — 1123F ACP DISCUSS/DSCN MKR DOCD: CPT | Performed by: INTERNAL MEDICINE

## 2021-07-09 PROCEDURE — 80053 COMPREHEN METABOLIC PANEL: CPT

## 2021-07-09 PROCEDURE — 84466 ASSAY OF TRANSFERRIN: CPT

## 2021-07-09 PROCEDURE — 82728 ASSAY OF FERRITIN: CPT

## 2021-07-09 NOTE — PROGRESS NOTES
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  Complains of chronic tingling and numbness affecting upper and lower extremity without any recent worsening.  Denies any bleeding.  Denies any new lymph node enlargement.  Denies any recent change in bowel habits.          Past Medical History, Past Surgical History, Social History, Family History have been reviewed and are without significant changes except as mentioned.    Review of Systems   Constitutional: Positive for fatigue.   Musculoskeletal: Positive for arthralgias.   Neurological: Positive for numbness (Positive for chronic tingling and numbness affecting upper and lower extremity.).   Hematological: Negative for adenopathy.      A comprehensive 14 point review of systems was performed and was negative except as mentioned.    Medications:  The current medication list was reviewed in the EMR    ALLERGIES:  No Known Allergies    Objective      Vitals:    07/09/21 0958   BP: 126/70   Pulse: 55   Resp: 18   Temp: 97.7 °F (36.5 °C)   TempSrc: Temporal   SpO2: 95%   Weight: 103 kg (226 lb 14.4 oz)   Height: 180.3 cm (70.98\")   PainSc: 0-No pain     Current Status 7/9/2021   ECOG score 1       Physical Exam  Cardiovascular:      Rate and Rhythm: Normal rate and regular rhythm.   Pulmonary:      Breath sounds: Normal breath sounds.   Neurological:      Mental Status: He is alert and oriented to person, place, and time.           RECENT LABS:  Glucose   Date Value Ref Range Status   07/09/2021 " 97 65 - 99 mg/dL Final     Sodium   Date Value Ref Range Status   07/09/2021 132 (L) 136 - 145 mmol/L Final   04/05/2018 139 136 - 145 mmol/L Final     Potassium   Date Value Ref Range Status   07/09/2021 4.4 3.5 - 5.2 mmol/L Final   04/05/2018 4.1 3.3 - 5.0 mmol/L Final     CO2   Date Value Ref Range Status   07/09/2021 25.0 22.0 - 29.0 mmol/L Final     Total CO2   Date Value Ref Range Status   04/05/2018 25 20 - 31 mmol/L Final     Chloride   Date Value Ref Range Status   07/09/2021 102 98 - 107 mmol/L Final   04/05/2018 106 99 - 111 mmol/L Final     Anion Gap   Date Value Ref Range Status   07/09/2021 5.0 5.0 - 15.0 mmol/L Final     Creatinine   Date Value Ref Range Status   07/09/2021 1.08 0.76 - 1.27 mg/dL Final   04/05/2018 1.0 0.6 - 1.3 mg/dL Final     BUN   Date Value Ref Range Status   07/09/2021 16 8 - 23 mg/dL Final   04/05/2018 11 7.0 - 18.0 mg/dL Final     BUN/Creatinine Ratio   Date Value Ref Range Status   07/09/2021 14.8 7.0 - 25.0 Final     Calcium   Date Value Ref Range Status   07/09/2021 8.8 8.6 - 10.5 mg/dL Final   04/05/2018 8.5 8.1 - 10.2 mg/dL Final     eGFR Non  Amer   Date Value Ref Range Status   07/09/2021 66 >60 mL/min/1.73 Final     Alkaline Phosphatase   Date Value Ref Range Status   07/09/2021 47 39 - 117 U/L Final   04/05/2018 55 30 - 120 IU/L Final     Total Protein   Date Value Ref Range Status   07/09/2021 6.4 6.0 - 8.5 g/dL Final     ALT (SGPT)   Date Value Ref Range Status   07/09/2021 9 1 - 41 U/L Final   04/05/2018 10 (L) 17 - 63 IU/L Final     AST (SGOT)   Date Value Ref Range Status   07/09/2021 18 1 - 40 U/L Final   04/05/2018 17 15 - 41 IU/L Final     Total Bilirubin   Date Value Ref Range Status   07/09/2021 1.1 0.0 - 1.2 mg/dL Final   04/05/2018 0.42 0 - 1.50 mg/dL Final     Albumin   Date Value Ref Range Status   07/09/2021 4.00 3.50 - 5.20 g/dL Final   04/05/2018 3.8 3.4 - 5.0 g/dl Final     Globulin   Date Value Ref Range Status   07/09/2021 2.4 gm/dL Final      Lab Results   Component Value Date    WBC 3.74 07/09/2021    HGB 12.5 (L) 07/09/2021    HCT 36.8 (L) 07/09/2021    MCV 88.0 07/09/2021    PLT 74 (L) 07/09/2021     Lab Results   Component Value Date    NEUTROABS 2.38 07/09/2021    IRON 75 07/09/2021    IRON 106 04/09/2021    IRON 72 12/16/2020    TIBC 267 (L) 07/09/2021    TIBC 282 (L) 04/09/2021    TIBC 274 (L) 12/16/2020    LABIRON 28 07/09/2021    LABIRON 38 04/09/2021    LABIRON 26 12/16/2020    FERRITIN 237.00 07/09/2021    FERRITIN 246.20 04/09/2021    FERRITIN 170.50 12/16/2020    [id number] 632 04/09/2021    MDUWZHSX75 >2,000 (H) 12/16/2020    [id number] 816 09/25/2020    FOLATE >20.00 04/09/2021    FOLATE >20.00 12/16/2020    FOLATE >20.00 09/25/2020     Lab Results   Component Value Date    CEA 2.10 04/09/2021         PATHOLOGY:  * Cannot find OR log *         RADIOLOGY DATA :  No radiology results for the last 7 days        Assessment/Plan     1.  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 Goins was negative for any malignancy.  Next colonoscopy is recommended in April 2022  -CEA level done earlier today is normal at 2.26  -We will ask patient to return to clinic in 3 months with repeat CBC, CMP, CEA, iron studies, ferritin, B12 and folate to be done on that day  -Next surveillance CT of chest, abdomen and pelvis with contrast will be done in December 2021.      2.  Adenocarcinoma of transverse colon, stage III, T3 N1 M0 diagnosed in July 2020  -Had a surgery followed by [facility] that completed in March 2013.  We will continue with clinical surveillance    3.  Anemia:  -Hemoglobin is 12.4  -Patient remains on B12 injection monthly along with folic acid 1 mg p.o. daily.  -We will repeat anemia work-up upon next clinic visit in 3 months    4.  Thrombocytopenia:  -Secondary to splenomegaly plus or minus  ITP  -Platelet count is 74,000  -Patient denies any bleeding.  Will monitor with CBC    5.  Vitamin B12 deficiency  -Remains on monthly B12 injection at home monthly.  Has enough supply for B12 injection.    6.  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  Health maintenance: Patient does not smoke.  Had a colonoscopy in April 2019 by Dr. Goins    8. Advance Care Planning: For now patient remains full code and is able to make decisions.  Patient has health care surrogate mentioned on chart.                 PHQ-9 Total Score: 0   -Patient is not homicidal or suicidal.  No acute intervention required.    Sunny Ernandez reports a pain score of 0.  Given his pain assessment as noted, treatment options were discussed and the following options were decided upon as a follow-up plan to address the patient's pain: continuation of current treatment plan for pain.         Dany Baig MD  7/9/2021  10:37 CDT        Part of this note may be an electronic transcription/translation of spoken language to printed text using the Dragon Dictation System.          CC:

## 2021-07-12 ENCOUNTER — TELEPHONE (OUTPATIENT)
Dept: ONCOLOGY | Facility: HOSPITAL | Age: 81
End: 2021-07-12

## 2021-07-12 NOTE — TELEPHONE ENCOUNTER
----- Message from Dany Baig MD sent at 7/10/2021 11:18 AM CDT -----  Please let patient know, B12 level is stable at 612.  Recommend continue with monthly B12 injection.  Recommend continue taking folic acid daily.  Colon cancer blood test CEA level is normal at 2.26.  No need to start any iron supplementation at present.  Thank you

## 2021-07-26 RX ORDER — FOLIC ACID 1 MG/1
TABLET ORAL
Qty: 90 TABLET | Refills: 0 | Status: SHIPPED | OUTPATIENT
Start: 2021-07-26 | End: 2021-10-22

## 2021-10-15 ENCOUNTER — LAB (OUTPATIENT)
Dept: ONCOLOGY | Facility: HOSPITAL | Age: 81
End: 2021-10-15

## 2021-10-15 ENCOUNTER — OFFICE VISIT (OUTPATIENT)
Dept: ONCOLOGY | Facility: CLINIC | Age: 81
End: 2021-10-15

## 2021-10-15 VITALS
SYSTOLIC BLOOD PRESSURE: 138 MMHG | OXYGEN SATURATION: 94 % | BODY MASS INDEX: 30.6 KG/M2 | TEMPERATURE: 97.1 F | HEART RATE: 74 BPM | RESPIRATION RATE: 18 BRPM | DIASTOLIC BLOOD PRESSURE: 63 MMHG | WEIGHT: 219.3 LBS

## 2021-10-15 DIAGNOSIS — C18.2 MALIGNANT NEOPLASM OF ASCENDING COLON (HCC): ICD-10-CM

## 2021-10-15 DIAGNOSIS — D64.9 ANEMIA, UNSPECIFIED TYPE: ICD-10-CM

## 2021-10-15 DIAGNOSIS — D69.6 THROMBOCYTOPENIA (HCC): Chronic | ICD-10-CM

## 2021-10-15 DIAGNOSIS — C18.2 MALIGNANT NEOPLASM OF ASCENDING COLON (HCC): Primary | Chronic | ICD-10-CM

## 2021-10-15 DIAGNOSIS — E53.8 B12 DEFICIENCY: ICD-10-CM

## 2021-10-15 DIAGNOSIS — E53.8 B12 DEFICIENCY: Chronic | ICD-10-CM

## 2021-10-15 DIAGNOSIS — G62.0 NEUROPATHY DUE TO CHEMOTHERAPEUTIC DRUG (HCC): Chronic | ICD-10-CM

## 2021-10-15 DIAGNOSIS — D64.9 ANEMIA, UNSPECIFIED TYPE: Chronic | ICD-10-CM

## 2021-10-15 DIAGNOSIS — G62.0 NEUROPATHY DUE TO CHEMOTHERAPEUTIC DRUG (HCC): ICD-10-CM

## 2021-10-15 DIAGNOSIS — T45.1X5A NEUROPATHY DUE TO CHEMOTHERAPEUTIC DRUG (HCC): ICD-10-CM

## 2021-10-15 DIAGNOSIS — T45.1X5A NEUROPATHY DUE TO CHEMOTHERAPEUTIC DRUG (HCC): Chronic | ICD-10-CM

## 2021-10-15 DIAGNOSIS — D69.6 THROMBOCYTOPENIA (HCC): ICD-10-CM

## 2021-10-15 LAB
ALBUMIN SERPL-MCNC: 4 G/DL (ref 3.5–5.2)
ALBUMIN/GLOB SERPL: 1.4 G/DL
ALP SERPL-CCNC: 51 U/L (ref 39–117)
ALT SERPL W P-5'-P-CCNC: 9 U/L (ref 1–41)
ANION GAP SERPL CALCULATED.3IONS-SCNC: 9 MMOL/L (ref 5–15)
AST SERPL-CCNC: 18 U/L (ref 1–40)
BASOPHILS # BLD AUTO: 0.03 10*3/MM3 (ref 0–0.2)
BASOPHILS NFR BLD AUTO: 0.8 % (ref 0–1.5)
BILIRUB SERPL-MCNC: 1.4 MG/DL (ref 0–1.2)
BUN SERPL-MCNC: 11 MG/DL (ref 8–23)
BUN/CREAT SERPL: 12.9 (ref 7–25)
CALCIUM SPEC-SCNC: 8.6 MG/DL (ref 8.6–10.5)
CEA SERPL-MCNC: 2.61 NG/ML
CHLORIDE SERPL-SCNC: 105 MMOL/L (ref 98–107)
CO2 SERPL-SCNC: 25 MMOL/L (ref 22–29)
CREAT SERPL-MCNC: 0.85 MG/DL (ref 0.76–1.27)
DEPRECATED RDW RBC AUTO: 43.3 FL (ref 37–54)
EOSINOPHIL # BLD AUTO: 0.11 10*3/MM3 (ref 0–0.4)
EOSINOPHIL NFR BLD AUTO: 2.9 % (ref 0.3–6.2)
ERYTHROCYTE [DISTWIDTH] IN BLOOD BY AUTOMATED COUNT: 13.7 % (ref 12.3–15.4)
FERRITIN SERPL-MCNC: 207.5 NG/ML (ref 30–400)
FOLATE SERPL-MCNC: >20 NG/ML (ref 4.78–24.2)
GFR SERPL CREATININE-BSD FRML MDRD: 87 ML/MIN/1.73
GLOBULIN UR ELPH-MCNC: 2.8 GM/DL
GLUCOSE SERPL-MCNC: 108 MG/DL (ref 65–99)
HCT VFR BLD AUTO: 35 % (ref 37.5–51)
HGB BLD-MCNC: 12.1 G/DL (ref 13–17.7)
IMM GRANULOCYTES # BLD AUTO: 0.01 10*3/MM3 (ref 0–0.05)
IMM GRANULOCYTES NFR BLD AUTO: 0.3 % (ref 0–0.5)
IRON 24H UR-MRATE: 53 MCG/DL (ref 59–158)
IRON SATN MFR SERPL: 20 % (ref 20–50)
LYMPHOCYTES # BLD AUTO: 0.6 10*3/MM3 (ref 0.7–3.1)
LYMPHOCYTES NFR BLD AUTO: 15.9 % (ref 19.6–45.3)
MCH RBC QN AUTO: 30.1 PG (ref 26.6–33)
MCHC RBC AUTO-ENTMCNC: 34.6 G/DL (ref 31.5–35.7)
MCV RBC AUTO: 87.1 FL (ref 79–97)
MONOCYTES # BLD AUTO: 0.39 10*3/MM3 (ref 0.1–0.9)
MONOCYTES NFR BLD AUTO: 10.3 % (ref 5–12)
NEUTROPHILS NFR BLD AUTO: 2.63 10*3/MM3 (ref 1.7–7)
NEUTROPHILS NFR BLD AUTO: 69.8 % (ref 42.7–76)
NRBC BLD AUTO-RTO: 0 /100 WBC (ref 0–0.2)
PLATELET # BLD AUTO: 70 10*3/MM3 (ref 140–450)
PMV BLD AUTO: 11.9 FL (ref 6–12)
POTASSIUM SERPL-SCNC: 3.8 MMOL/L (ref 3.5–5.2)
PROT SERPL-MCNC: 6.8 G/DL (ref 6–8.5)
RBC # BLD AUTO: 4.02 10*6/MM3 (ref 4.14–5.8)
SODIUM SERPL-SCNC: 139 MMOL/L (ref 136–145)
TIBC SERPL-MCNC: 262 MCG/DL (ref 298–536)
TRANSFERRIN SERPL-MCNC: 176 MG/DL (ref 200–360)
VIT B12 BLD-MCNC: 541 PG/ML (ref 211–946)
WBC # BLD AUTO: 3.77 10*3/MM3 (ref 3.4–10.8)

## 2021-10-15 PROCEDURE — 82607 VITAMIN B-12: CPT

## 2021-10-15 PROCEDURE — 84466 ASSAY OF TRANSFERRIN: CPT

## 2021-10-15 PROCEDURE — 82728 ASSAY OF FERRITIN: CPT

## 2021-10-15 PROCEDURE — 85025 COMPLETE CBC W/AUTO DIFF WBC: CPT

## 2021-10-15 PROCEDURE — 80053 COMPREHEN METABOLIC PANEL: CPT

## 2021-10-15 PROCEDURE — 82378 CARCINOEMBRYONIC ANTIGEN: CPT

## 2021-10-15 PROCEDURE — G0463 HOSPITAL OUTPT CLINIC VISIT: HCPCS | Performed by: INTERNAL MEDICINE

## 2021-10-15 PROCEDURE — 99214 OFFICE O/P EST MOD 30 MIN: CPT | Performed by: INTERNAL MEDICINE

## 2021-10-15 PROCEDURE — 1126F AMNT PAIN NOTED NONE PRSNT: CPT | Performed by: INTERNAL MEDICINE

## 2021-10-15 PROCEDURE — 82746 ASSAY OF FOLIC ACID SERUM: CPT

## 2021-10-15 PROCEDURE — 1123F ACP DISCUSS/DSCN MKR DOCD: CPT | Performed by: INTERNAL MEDICINE

## 2021-10-15 PROCEDURE — 83540 ASSAY OF IRON: CPT

## 2021-10-15 RX ORDER — FINASTERIDE 5 MG/1
5 TABLET, FILM COATED ORAL DAILY
COMMUNITY
Start: 2021-08-30 | End: 2022-09-16

## 2021-10-15 RX ORDER — CYANOCOBALAMIN 1000 UG/ML
INJECTION, SOLUTION INTRAMUSCULAR; SUBCUTANEOUS
COMMUNITY
Start: 2021-08-06 | End: 2022-04-22 | Stop reason: SDUPTHER

## 2021-10-15 RX ORDER — SIMVASTATIN 40 MG
40 TABLET ORAL NIGHTLY
COMMUNITY
Start: 2021-09-20 | End: 2023-01-17

## 2021-10-15 RX ORDER — TAMSULOSIN HYDROCHLORIDE 0.4 MG/1
1 CAPSULE ORAL DAILY
COMMUNITY
Start: 2021-08-30

## 2021-10-18 ENCOUNTER — TELEPHONE (OUTPATIENT)
Dept: ONCOLOGY | Facility: HOSPITAL | Age: 81
End: 2021-10-18

## 2021-10-18 NOTE — TELEPHONE ENCOUNTER
----- Message from Dany Baig MD sent at 10/16/2021 10:14 PM CDT -----  Please let patient know, CEA level was normal at 2.61.  Iron studies are adequate no need to start any iron replacement at present.  Recommend continue with monthly B12 injection and folic acid p.o. daily.  Thank you

## 2021-10-18 NOTE — TELEPHONE ENCOUNTER
Pt returns call, lab results given along with medication instructions per Dr. Baig, v/u obtained.

## 2021-10-22 RX ORDER — FOLIC ACID 1 MG/1
TABLET ORAL
Qty: 90 TABLET | Refills: 0 | Status: SHIPPED | OUTPATIENT
Start: 2021-10-22 | End: 2022-02-11

## 2021-10-22 NOTE — TELEPHONE ENCOUNTER
Rx Refill Note  Requested Prescriptions     Pending Prescriptions Disp Refills   • folic acid (FOLVITE) 1 MG tablet [Pharmacy Med Name: Folic Acid 1 MG Oral Tablet] 90 tablet 0     Sig: Take 1 tablet by mouth once daily      Last office visit with prescribing clinician: Visit date not found      Next office visit with prescribing clinician: Visit date not found            Ankita Lopez RN  10/22/21, 13:11 CDT

## 2022-01-12 ENCOUNTER — LAB (OUTPATIENT)
Dept: LAB | Facility: HOSPITAL | Age: 82
End: 2022-01-12

## 2022-01-12 ENCOUNTER — HOSPITAL ENCOUNTER (OUTPATIENT)
Dept: CT IMAGING | Facility: HOSPITAL | Age: 82
Discharge: HOME | End: 2022-01-12

## 2022-01-12 DIAGNOSIS — D69.6 THROMBOCYTOPENIA: ICD-10-CM

## 2022-01-12 DIAGNOSIS — C18.2 MALIGNANT NEOPLASM OF ASCENDING COLON: ICD-10-CM

## 2022-01-12 DIAGNOSIS — C18.2 MALIGNANT NEOPLASM OF ASCENDING COLON: Chronic | ICD-10-CM

## 2022-01-12 DIAGNOSIS — D64.9 ANEMIA, UNSPECIFIED TYPE: ICD-10-CM

## 2022-01-12 LAB
ALBUMIN SERPL-MCNC: 3.9 G/DL (ref 3.5–5.2)
ALBUMIN/GLOB SERPL: 1.7 G/DL
ALP SERPL-CCNC: 55 U/L (ref 39–117)
ALT SERPL W P-5'-P-CCNC: 15 U/L (ref 1–41)
ANION GAP SERPL CALCULATED.3IONS-SCNC: 8 MMOL/L (ref 5–15)
AST SERPL-CCNC: 21 U/L (ref 1–40)
BASOPHILS # BLD AUTO: 0.03 10*3/MM3 (ref 0–0.2)
BASOPHILS NFR BLD AUTO: 0.8 % (ref 0–1.5)
BILIRUB SERPL-MCNC: 0.9 MG/DL (ref 0–1.2)
BUN SERPL-MCNC: 15 MG/DL (ref 8–23)
BUN/CREAT SERPL: 16.1 (ref 7–25)
CALCIUM SPEC-SCNC: 8.1 MG/DL (ref 8.6–10.5)
CEA SERPL-MCNC: 2.14 NG/ML
CHLORIDE SERPL-SCNC: 106 MMOL/L (ref 98–107)
CO2 SERPL-SCNC: 25 MMOL/L (ref 22–29)
CREAT SERPL-MCNC: 0.93 MG/DL (ref 0.76–1.27)
DEPRECATED RDW RBC AUTO: 44.1 FL (ref 37–54)
EOSINOPHIL # BLD AUTO: 0.18 10*3/MM3 (ref 0–0.4)
EOSINOPHIL NFR BLD AUTO: 4.6 % (ref 0.3–6.2)
ERYTHROCYTE [DISTWIDTH] IN BLOOD BY AUTOMATED COUNT: 14 % (ref 12.3–15.4)
FERRITIN SERPL-MCNC: 224.2 NG/ML (ref 30–400)
FOLATE SERPL-MCNC: >20 NG/ML (ref 4.78–24.2)
GFR SERPL CREATININE-BSD FRML MDRD: 78 ML/MIN/1.73
GLOBULIN UR ELPH-MCNC: 2.3 GM/DL
GLUCOSE SERPL-MCNC: 93 MG/DL (ref 65–99)
HCT VFR BLD AUTO: 35.7 % (ref 37.5–51)
HGB BLD-MCNC: 12.3 G/DL (ref 13–17.7)
IMM GRANULOCYTES # BLD AUTO: 0.01 10*3/MM3 (ref 0–0.05)
IMM GRANULOCYTES NFR BLD AUTO: 0.3 % (ref 0–0.5)
IRON 24H UR-MRATE: 72 MCG/DL (ref 59–158)
IRON SATN MFR SERPL: 25 % (ref 20–50)
LYMPHOCYTES # BLD AUTO: 0.78 10*3/MM3 (ref 0.7–3.1)
LYMPHOCYTES NFR BLD AUTO: 19.9 % (ref 19.6–45.3)
MCH RBC QN AUTO: 29.6 PG (ref 26.6–33)
MCHC RBC AUTO-ENTMCNC: 34.5 G/DL (ref 31.5–35.7)
MCV RBC AUTO: 85.8 FL (ref 79–97)
MONOCYTES # BLD AUTO: 0.35 10*3/MM3 (ref 0.1–0.9)
MONOCYTES NFR BLD AUTO: 8.9 % (ref 5–12)
NEUTROPHILS NFR BLD AUTO: 2.57 10*3/MM3 (ref 1.7–7)
NEUTROPHILS NFR BLD AUTO: 65.5 % (ref 42.7–76)
NRBC BLD AUTO-RTO: 0 /100 WBC (ref 0–0.2)
PLATELET # BLD AUTO: 78 10*3/MM3 (ref 140–450)
PMV BLD AUTO: 11.4 FL (ref 6–12)
POTASSIUM SERPL-SCNC: 4.3 MMOL/L (ref 3.5–5.2)
PROT SERPL-MCNC: 6.2 G/DL (ref 6–8.5)
RBC # BLD AUTO: 4.16 10*6/MM3 (ref 4.14–5.8)
SODIUM SERPL-SCNC: 139 MMOL/L (ref 136–145)
TIBC SERPL-MCNC: 292 MCG/DL (ref 298–536)
TRANSFERRIN SERPL-MCNC: 196 MG/DL (ref 200–360)
VIT B12 BLD-MCNC: 960 PG/ML (ref 211–946)
WBC NRBC COR # BLD: 3.92 10*3/MM3 (ref 3.4–10.8)

## 2022-01-12 PROCEDURE — 85025 COMPLETE CBC W/AUTO DIFF WBC: CPT

## 2022-01-12 PROCEDURE — 80053 COMPREHEN METABOLIC PANEL: CPT

## 2022-01-12 PROCEDURE — 82607 VITAMIN B-12: CPT

## 2022-01-12 PROCEDURE — 71250 CT THORAX DX C-: CPT

## 2022-01-12 PROCEDURE — 82746 ASSAY OF FOLIC ACID SERUM: CPT

## 2022-01-12 PROCEDURE — 82728 ASSAY OF FERRITIN: CPT

## 2022-01-12 PROCEDURE — 83540 ASSAY OF IRON: CPT

## 2022-01-12 PROCEDURE — 74176 CT ABD & PELVIS W/O CONTRAST: CPT

## 2022-01-12 PROCEDURE — 82378 CARCINOEMBRYONIC ANTIGEN: CPT

## 2022-01-12 PROCEDURE — 84466 ASSAY OF TRANSFERRIN: CPT

## 2022-01-14 ENCOUNTER — OFFICE VISIT (OUTPATIENT)
Dept: ONCOLOGY | Facility: CLINIC | Age: 82
End: 2022-01-14

## 2022-01-14 VITALS
DIASTOLIC BLOOD PRESSURE: 77 MMHG | BODY MASS INDEX: 32.22 KG/M2 | HEART RATE: 77 BPM | OXYGEN SATURATION: 96 % | SYSTOLIC BLOOD PRESSURE: 138 MMHG | WEIGHT: 230.9 LBS | TEMPERATURE: 97.9 F

## 2022-01-14 DIAGNOSIS — D64.9 ANEMIA, UNSPECIFIED TYPE: Chronic | ICD-10-CM

## 2022-01-14 DIAGNOSIS — D69.6 THROMBOCYTOPENIA: Chronic | ICD-10-CM

## 2022-01-14 DIAGNOSIS — E53.8 B12 DEFICIENCY: Chronic | ICD-10-CM

## 2022-01-14 DIAGNOSIS — G62.0 NEUROPATHY DUE TO CHEMOTHERAPEUTIC DRUG: Chronic | ICD-10-CM

## 2022-01-14 DIAGNOSIS — T45.1X5A NEUROPATHY DUE TO CHEMOTHERAPEUTIC DRUG: Chronic | ICD-10-CM

## 2022-01-14 DIAGNOSIS — C18.2 MALIGNANT NEOPLASM OF ASCENDING COLON: Primary | Chronic | ICD-10-CM

## 2022-01-14 PROCEDURE — 1123F ACP DISCUSS/DSCN MKR DOCD: CPT | Performed by: INTERNAL MEDICINE

## 2022-01-14 PROCEDURE — G0463 HOSPITAL OUTPT CLINIC VISIT: HCPCS | Performed by: INTERNAL MEDICINE

## 2022-01-14 PROCEDURE — 99214 OFFICE O/P EST MOD 30 MIN: CPT | Performed by: INTERNAL MEDICINE

## 2022-01-14 PROCEDURE — 1126F AMNT PAIN NOTED NONE PRSNT: CPT | Performed by: INTERNAL MEDICINE

## 2022-01-14 NOTE — PROGRESS NOTES
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  Complains of chronic tingling and numbness affecting upper and lower extremity without any recent worsening.  Patient had a recent blood work and a CT scan for surveillance purposes, is here to discuss the results.  Denies any bleeding.  Denies any new lymph node enlargement.  Denies any new abdominal pain or change in bowel habits.  Complains of chronic arthralgias.          Past Medical History, Past Surgical History, Social History, Family History have been reviewed and are without significant changes except as mentioned.    Review of Systems   A comprehensive 14 point review of systems was performed and was negative except as mentioned in HPI.    Medications:  The current medication list was reviewed in the EMR    ALLERGIES:  No Known Allergies    Objective      Vitals:    01/14/22 0959   BP: 138/77   Pulse: 77   Temp: 97.9 °F (36.6 °C)   TempSrc: Temporal   SpO2: 96%   Weight: 105 kg (230 lb 14.4 oz)   PainSc: 0-No pain     Current Status 7/9/2021   ECOG score 1       Physical Exam  Pulmonary:      Breath sounds: Normal breath sounds.   Neurological:      Mental Status: He is alert and oriented to person, place, and time.           RECENT LABS:  Glucose   Date Value Ref Range Status   01/12/2022 93 65 - 99 mg/dL Final     Sodium   Date Value Ref Range Status   01/12/2022 139 136 - 145 mmol/L Final   04/05/2018 139 136 - 145 mmol/L Final     Potassium   Date Value Ref Range Status   01/12/2022  4.3 3.5 - 5.2 mmol/L Final   04/05/2018 4.1 3.3 - 5.0 mmol/L Final     CO2   Date Value Ref Range Status   01/12/2022 25.0 22.0 - 29.0 mmol/L Final     Total CO2   Date Value Ref Range Status   04/05/2018 25 20 - 31 mmol/L Final     Chloride   Date Value Ref Range Status   01/12/2022 106 98 - 107 mmol/L Final   04/05/2018 106 99 - 111 mmol/L Final     Anion Gap   Date Value Ref Range Status   01/12/2022 8.0 5.0 - 15.0 mmol/L Final     Creatinine   Date Value Ref Range Status   01/12/2022 0.93 0.76 - 1.27 mg/dL Final   04/05/2018 1.0 0.6 - 1.3 mg/dL Final     BUN   Date Value Ref Range Status   01/12/2022 15 8 - 23 mg/dL Final   04/05/2018 11 7.0 - 18.0 mg/dL Final     BUN/Creatinine Ratio   Date Value Ref Range Status   01/12/2022 16.1 7.0 - 25.0 Final     Calcium   Date Value Ref Range Status   01/12/2022 8.1 (L) 8.6 - 10.5 mg/dL Final   04/05/2018 8.5 8.1 - 10.2 mg/dL Final     eGFR Non  Amer   Date Value Ref Range Status   01/12/2022 78 >60 mL/min/1.73 Final     Alkaline Phosphatase   Date Value Ref Range Status   01/12/2022 55 39 - 117 U/L Final   04/05/2018 55 30 - 120 IU/L Final     Total Protein   Date Value Ref Range Status   01/12/2022 6.2 6.0 - 8.5 g/dL Final     ALT (SGPT)   Date Value Ref Range Status   01/12/2022 15 1 - 41 U/L Final   04/05/2018 10 (L) 17 - 63 IU/L Final     AST (SGOT)   Date Value Ref Range Status   01/12/2022 21 1 - 40 U/L Final   04/05/2018 17 15 - 41 IU/L Final     Total Bilirubin   Date Value Ref Range Status   01/12/2022 0.9 0.0 - 1.2 mg/dL Final   04/05/2018 0.42 0 - 1.50 mg/dL Final     Albumin   Date Value Ref Range Status   01/12/2022 3.90 3.50 - 5.20 g/dL Final   04/05/2018 3.8 3.4 - 5.0 g/dl Final     Globulin   Date Value Ref Range Status   01/12/2022 2.3 gm/dL Final     Lab Results   Component Value Date    WBC 3.92 01/12/2022    HGB 12.3 (L) 01/12/2022    HCT 35.7 (L) 01/12/2022    MCV 85.8 01/12/2022    PLT 78 (L) 01/12/2022     Lab Results   Component Value Date     NEUTROABS 2.57 01/12/2022    IRON 72 01/12/2022    IRON 53 (L) 10/15/2021    IRON 75 07/09/2021    TIBC 292 (L) 01/12/2022    TIBC 262 (L) 10/15/2021    TIBC 267 (L) 07/09/2021    LABIRON 25 01/12/2022    LABIRON 20 10/15/2021    LABIRON 28 07/09/2021    FERRITIN 224.20 01/12/2022    FERRITIN 207.50 10/15/2021    FERRITIN 237.00 07/09/2021    [id number] 960 (H) 01/12/2022    [id number] 541 10/15/2021    [id number] 612 07/09/2021    FOLATE >20.00 01/12/2022    FOLATE >20.00 10/15/2021    FOLATE >20.00 07/09/2021     Lab Results   Component Value Date    CEA 2.14 01/12/2022         PATHOLOGY:  * Cannot find OR log *         RADIOLOGY DATA :  CT Abdomen Pelvis Without Contrast    Result Date: 1/12/2022  No definite residual, recurrent or metastatic disease. There is however a small amount of free fluid in the upper abdomen which is new the etiology of which is unclear. Large ventral hernia contains small bowel loops without incarceration or strangulation. Cholelithiasis without cholecystitis. Splenomegaly etiology unclear. Other findings as above. See body of report for details. Electronically signed by:  Angel Hunter MD  1/12/2022 1:15 PM CST Workstation: [id number]    CT Chest Without Contrast Diagnostic    Result Date: 1/12/2022  No definite residual, recurrent or metastatic disease. There is however a small amount of free fluid in the upper abdomen which is new the etiology of which is unclear. Large ventral hernia contains small bowel loops without incarceration or strangulation. Cholelithiasis without cholecystitis. Splenomegaly etiology unclear. Other findings as above. See body of report for details. Electronically signed by:  Angel Hunter MD  1/12/2022 1:15 PM CST Workstation: [id number]          Assessment/Plan     1.  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  - Colonoscopy in April 2019 by Dr. Goins was  negative for malignancy.  Next colonoscopy recommended is in April 2022  - CEA level is normal at 2.14  - Surveillance CT of chest abdomen and pelvis with contrast done on January 12, 2022 does not show any evidence of recurrence.  Results were discussed with patient  - We will have patient return to clinic in 4 months with repeat CBC, CMP, CEA, iron studies, ferritin, B12 and folate to be done on that day.  - Next surveillance CT of chest, abdomen and pelvis with contrast will be done in January 2023.    2.  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    3.  Anemia:  His hemoglobin is 12.3.  - Recommend continue with B12 injection monthly with folic acid p.o. daily  - Anemia work-up in January 12, 2022 does not show any evidence of iron deficiency    4.  Thrombocytopenia:  - Secondary to splenomegaly plus or repeat  - Platelet count is 78,000.  Will monitor with CBC    5.  Vitamin B12 deficiency  - Remains on monthly   B12 injection at home    6.  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    7.  Health maintenance: Patient does not smoke.  Had a colonoscopy in April 2019 by Dr. Goins    8. Advance Care Planning: For now patient remains full code and is able to make decisions.  Patient has health care surrogate mentioned on chart.                 PHQ-9 Total Score: 0   -Patient is not homicidal or suicidal.  No acute intervention required.    Sunny Ernandez reports a pain score of 0.  Given his pain assessment as noted, treatment options were discussed and the following options were decided upon as a follow-up plan to address the patient's pain: continuation of current treatment plan for pain.         Dany Baig MD  1/14/2022  10:17 CST        Part of this note may be an electronic transcription/translation of spoken language to printed text using the Dragon Dictation System.          CC:

## 2022-02-11 RX ORDER — FOLIC ACID 1 MG/1
TABLET ORAL
Qty: 90 TABLET | Refills: 0 | Status: SHIPPED | OUTPATIENT
Start: 2022-02-11 | End: 2022-05-13 | Stop reason: SDUPTHER

## 2022-02-11 NOTE — TELEPHONE ENCOUNTER
Rx Refill Note  Requested Prescriptions     Pending Prescriptions Disp Refills   • folic acid (FOLVITE) 1 MG tablet [Pharmacy Med Name: Folic Acid 1 MG Oral Tablet] 90 tablet 0     Sig: Take 1 tablet by mouth once daily      Last office visit with prescribing clinician: 1/14/2022      Next office visit with prescribing clinician: 5/13/2022            Elsy Correa RN  02/11/22, 10:24 CST

## 2022-04-22 ENCOUNTER — OFFICE VISIT (OUTPATIENT)
Dept: SURGERY | Facility: CLINIC | Age: 82
End: 2022-04-22

## 2022-04-22 VITALS
DIASTOLIC BLOOD PRESSURE: 70 MMHG | BODY MASS INDEX: 32.06 KG/M2 | TEMPERATURE: 98.5 F | OXYGEN SATURATION: 94 % | SYSTOLIC BLOOD PRESSURE: 110 MMHG | WEIGHT: 229 LBS | HEIGHT: 71 IN | HEART RATE: 76 BPM

## 2022-04-22 DIAGNOSIS — C18.2 MALIGNANT NEOPLASM OF ASCENDING COLON: Primary | ICD-10-CM

## 2022-04-22 DIAGNOSIS — K43.2 INCISIONAL HERNIA, WITHOUT OBSTRUCTION OR GANGRENE: ICD-10-CM

## 2022-04-22 PROCEDURE — 99212 OFFICE O/P EST SF 10 MIN: CPT | Performed by: NURSE PRACTITIONER

## 2022-04-22 NOTE — PROGRESS NOTES
"Chief Complaint  Follow-up (Incisional hernia)    Subjective          Sunny Ernandez presents to [facility]  History of Present Illness  Mr. Sunny Ernandez is an 82 year old patient who presents today for recheck. He underwent prior colon resection and has an known ventral/incisional hernia.  He still has routine surveillance visits per medical oncology.  Most recent CEA was stable. Is due for routine surveillance colonoscopy at this time, he states he has appointment with Dr. Goins to schedule.  He denies an concerns of abdominal pain. States he has been eating and drinking well and denies any nausea or vomiting. States bowels are working well and denies any constipation or diarrhea.        Objective   Vital Signs:   /70 (BP Location: Left arm, Patient Position: Sitting, Cuff Size: Adult)   Pulse 76   Temp 98.5 °F (36.9 °C) (Temporal)   Ht 180.3 cm (71\")   Wt 104 kg (229 lb)   SpO2 94%   BMI 31.94 kg/m²            Physical Exam  Vitals reviewed.   Constitutional:       General: He is not in acute distress.     Appearance: Normal appearance. He is not ill-appearing, toxic-appearing or diaphoretic.   HENT:      Head: Normocephalic and atraumatic.   Pulmonary:      Effort: Pulmonary effort is normal. No respiratory distress.   Abdominal:      General: Bowel sounds are normal.      Palpations: Abdomen is soft.      Hernia: A hernia is present. Hernia is present in the ventral area.      Comments: Large ventral hernia   Neurological:      General: No focal deficit present.      Mental Status: He is alert and oriented to person, place, and time.   Psychiatric:         Mood and Affect: Mood normal.         Behavior: Behavior normal.         Thought Content: Thought content normal.         Judgment: Judgment normal.        Result Review :                 Assessment and Plan    Diagnoses and all orders for this visit:    1. Malignant neoplasm of ascending " colon (HCC) (Primary)    2. Incisional hernia, without obstruction or gangrene      I spent 18 minutes caring for Sunny on this date of service. This time includes time spent by me in the following activities:preparing for the visit, reviewing tests, obtaining and/or reviewing a separately obtained history, performing a medically appropriate examination and/or evaluation , documenting information in the medical record and care coordination  Follow Up   Return in about 1 year (around 4/22/2023), or if symptoms worsen or fail to improve. Continue routine surveillance with medical oncology. Recheck in one year unless concerns arise sooner. Continue follow up with Dr. Goins to schedule colonoscopy.        Patient was given instructions and counseling regarding his condition or for health maintenance advice. Please see specific information pulled into the AVS if appropriate.                 This document has been electronically signed by MARIA TERESA Galvan on April 22, 2022 13:40 CDT

## 2022-05-13 ENCOUNTER — OFFICE VISIT (OUTPATIENT)
Dept: ONCOLOGY | Facility: CLINIC | Age: 82
End: 2022-05-13

## 2022-05-13 ENCOUNTER — LAB (OUTPATIENT)
Dept: ONCOLOGY | Facility: HOSPITAL | Age: 82
End: 2022-05-13

## 2022-05-13 VITALS
WEIGHT: 232.1 LBS | DIASTOLIC BLOOD PRESSURE: 79 MMHG | TEMPERATURE: 97.4 F | OXYGEN SATURATION: 98 % | HEART RATE: 60 BPM | BODY MASS INDEX: 32.37 KG/M2 | RESPIRATION RATE: 18 BRPM | SYSTOLIC BLOOD PRESSURE: 133 MMHG

## 2022-05-13 DIAGNOSIS — E53.8 B12 DEFICIENCY: ICD-10-CM

## 2022-05-13 DIAGNOSIS — D64.9 ANEMIA, UNSPECIFIED TYPE: Chronic | ICD-10-CM

## 2022-05-13 DIAGNOSIS — D69.6 THROMBOCYTOPENIA: ICD-10-CM

## 2022-05-13 DIAGNOSIS — C18.2 MALIGNANT NEOPLASM OF ASCENDING COLON: Primary | Chronic | ICD-10-CM

## 2022-05-13 DIAGNOSIS — T45.1X5A NEUROPATHY DUE TO CHEMOTHERAPEUTIC DRUG: Chronic | ICD-10-CM

## 2022-05-13 DIAGNOSIS — G62.0 NEUROPATHY DUE TO CHEMOTHERAPEUTIC DRUG: ICD-10-CM

## 2022-05-13 DIAGNOSIS — G62.0 NEUROPATHY DUE TO CHEMOTHERAPEUTIC DRUG: Chronic | ICD-10-CM

## 2022-05-13 DIAGNOSIS — D64.9 ANEMIA, UNSPECIFIED TYPE: ICD-10-CM

## 2022-05-13 DIAGNOSIS — T45.1X5A NEUROPATHY DUE TO CHEMOTHERAPEUTIC DRUG: ICD-10-CM

## 2022-05-13 DIAGNOSIS — E53.8 B12 DEFICIENCY: Chronic | ICD-10-CM

## 2022-05-13 DIAGNOSIS — D69.6 THROMBOCYTOPENIA: Chronic | ICD-10-CM

## 2022-05-13 DIAGNOSIS — C18.2 MALIGNANT NEOPLASM OF ASCENDING COLON: ICD-10-CM

## 2022-05-13 LAB
ALBUMIN SERPL-MCNC: 3.7 G/DL (ref 3.5–5.2)
ALBUMIN/GLOB SERPL: 1.4 G/DL
ALP SERPL-CCNC: 53 U/L (ref 39–117)
ALT SERPL W P-5'-P-CCNC: 11 U/L (ref 1–41)
ANION GAP SERPL CALCULATED.3IONS-SCNC: 7 MMOL/L (ref 5–15)
AST SERPL-CCNC: 21 U/L (ref 1–40)
BASOPHILS # BLD AUTO: 0.03 10*3/MM3 (ref 0–0.2)
BASOPHILS NFR BLD AUTO: 0.8 % (ref 0–1.5)
BILIRUB SERPL-MCNC: 1 MG/DL (ref 0–1.2)
BUN SERPL-MCNC: 17 MG/DL (ref 8–23)
BUN/CREAT SERPL: 18.5 (ref 7–25)
CALCIUM SPEC-SCNC: 8.8 MG/DL (ref 8.6–10.5)
CEA SERPL-MCNC: 1.96 NG/ML
CHLORIDE SERPL-SCNC: 109 MMOL/L (ref 98–107)
CO2 SERPL-SCNC: 25 MMOL/L (ref 22–29)
CREAT SERPL-MCNC: 0.92 MG/DL (ref 0.76–1.27)
DEPRECATED RDW RBC AUTO: 43.9 FL (ref 37–54)
EGFRCR SERPLBLD CKD-EPI 2021: 83.1 ML/MIN/1.73
EOSINOPHIL # BLD AUTO: 0.17 10*3/MM3 (ref 0–0.4)
EOSINOPHIL NFR BLD AUTO: 4.7 % (ref 0.3–6.2)
ERYTHROCYTE [DISTWIDTH] IN BLOOD BY AUTOMATED COUNT: 13.7 % (ref 12.3–15.4)
FERRITIN SERPL-MCNC: 187.8 NG/ML (ref 30–400)
FOLATE SERPL-MCNC: 16.7 NG/ML (ref 4.78–24.2)
GLOBULIN UR ELPH-MCNC: 2.6 GM/DL
GLUCOSE SERPL-MCNC: 98 MG/DL (ref 65–99)
HCT VFR BLD AUTO: 34.9 % (ref 37.5–51)
HGB BLD-MCNC: 12.2 G/DL (ref 13–17.7)
IMM GRANULOCYTES # BLD AUTO: 0.05 10*3/MM3 (ref 0–0.05)
IMM GRANULOCYTES NFR BLD AUTO: 1.4 % (ref 0–0.5)
IRON 24H UR-MRATE: 68 MCG/DL (ref 59–158)
IRON SATN MFR SERPL: 28 % (ref 20–50)
LYMPHOCYTES # BLD AUTO: 0.79 10*3/MM3 (ref 0.7–3.1)
LYMPHOCYTES NFR BLD AUTO: 21.9 % (ref 19.6–45.3)
MCH RBC QN AUTO: 30.3 PG (ref 26.6–33)
MCHC RBC AUTO-ENTMCNC: 35 G/DL (ref 31.5–35.7)
MCV RBC AUTO: 86.6 FL (ref 79–97)
MONOCYTES # BLD AUTO: 0.31 10*3/MM3 (ref 0.1–0.9)
MONOCYTES NFR BLD AUTO: 8.6 % (ref 5–12)
NEUTROPHILS NFR BLD AUTO: 2.26 10*3/MM3 (ref 1.7–7)
NEUTROPHILS NFR BLD AUTO: 62.6 % (ref 42.7–76)
NRBC BLD AUTO-RTO: 0 /100 WBC (ref 0–0.2)
PLATELET # BLD AUTO: 70 10*3/MM3 (ref 140–450)
PMV BLD AUTO: 12.7 FL (ref 6–12)
POTASSIUM SERPL-SCNC: 3.9 MMOL/L (ref 3.5–5.2)
PROT SERPL-MCNC: 6.3 G/DL (ref 6–8.5)
RBC # BLD AUTO: 4.03 10*6/MM3 (ref 4.14–5.8)
SODIUM SERPL-SCNC: 141 MMOL/L (ref 136–145)
TIBC SERPL-MCNC: 246 MCG/DL (ref 298–536)
TRANSFERRIN SERPL-MCNC: 165 MG/DL (ref 200–360)
VIT B12 BLD-MCNC: 671 PG/ML (ref 211–946)
WBC NRBC COR # BLD: 3.61 10*3/MM3 (ref 3.4–10.8)

## 2022-05-13 PROCEDURE — 84466 ASSAY OF TRANSFERRIN: CPT

## 2022-05-13 PROCEDURE — 1126F AMNT PAIN NOTED NONE PRSNT: CPT | Performed by: INTERNAL MEDICINE

## 2022-05-13 PROCEDURE — 1123F ACP DISCUSS/DSCN MKR DOCD: CPT | Performed by: INTERNAL MEDICINE

## 2022-05-13 PROCEDURE — 82728 ASSAY OF FERRITIN: CPT

## 2022-05-13 PROCEDURE — 83540 ASSAY OF IRON: CPT

## 2022-05-13 PROCEDURE — 99214 OFFICE O/P EST MOD 30 MIN: CPT | Performed by: INTERNAL MEDICINE

## 2022-05-13 PROCEDURE — G0463 HOSPITAL OUTPT CLINIC VISIT: HCPCS | Performed by: INTERNAL MEDICINE

## 2022-05-13 PROCEDURE — 80053 COMPREHEN METABOLIC PANEL: CPT

## 2022-05-13 PROCEDURE — 85025 COMPLETE CBC W/AUTO DIFF WBC: CPT

## 2022-05-13 PROCEDURE — 82746 ASSAY OF FOLIC ACID SERUM: CPT

## 2022-05-13 PROCEDURE — 82607 VITAMIN B-12: CPT

## 2022-05-13 PROCEDURE — 82378 CARCINOEMBRYONIC ANTIGEN: CPT

## 2022-05-13 RX ORDER — FOLIC ACID 1 MG/1
1000 TABLET ORAL DAILY
Qty: 90 TABLET | Refills: 1 | Status: SHIPPED | OUTPATIENT
Start: 2022-05-13 | End: 2022-12-30

## 2022-05-13 NOTE — PROGRESS NOTES
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  Complains of chronic tingling and numbness affecting upper and lower extremity without any recent worsening.  Complains of fatigue.  Denies any bleeding.  Denies any new lymph node enlargement.  Denies any lymph abdominal pain or change in bowel habits.  Complains of chronic arthralgias.              Past Medical History, Past Surgical History, Social History, Family History have been reviewed and are without significant changes except as mentioned.    Review of Systems   A comprehensive 14 point review of systems was performed and was negative except as mentioned in HPI.    Medications:  The current medication list was reviewed in the EMR    ALLERGIES:  No Known Allergies    Objective      Vitals:    05/13/22 1024   BP: 133/79   Pulse: 60   Resp: 18   Temp: 97.4 °F (36.3 °C)   SpO2: 98%   Weight: 105 kg (232 lb 1.6 oz)   PainSc: 0-No pain     Current Status 7/9/2021   ECOG score 1       Physical Exam  Pulmonary:      Breath sounds: Normal breath sounds.   Neurological:      Mental Status: He is alert and oriented to person, place, and time.           RECENT LABS:  Glucose   Date Value Ref Range Status   05/13/2022 98 65 - 99 mg/dL Final     Sodium   Date Value Ref Range Status   05/13/2022 141 136 - 145 mmol/L Final   04/05/2018 139 136 - 145 mmol/L Final     Potassium   Date Value Ref Range Status   05/13/2022 3.9 3.5 - 5.2 mmol/L Final   04/05/2018 4.1 3.3 - 5.0 mmol/L Final     CO2   Date Value  Ref Range Status   05/13/2022 25.0 22.0 - 29.0 mmol/L Final     Total CO2   Date Value Ref Range Status   04/05/2018 25 20 - 31 mmol/L Final     Chloride   Date Value Ref Range Status   05/13/2022 109 (H) 98 - 107 mmol/L Final   04/05/2018 106 99 - 111 mmol/L Final     Anion Gap   Date Value Ref Range Status   05/13/2022 7.0 5.0 - 15.0 mmol/L Final     Creatinine   Date Value Ref Range Status   05/13/2022 0.92 0.76 - 1.27 mg/dL Final   04/05/2018 1.0 0.6 - 1.3 mg/dL Final     BUN   Date Value Ref Range Status   05/13/2022 17 8 - 23 mg/dL Final   04/05/2018 11 7.0 - 18.0 mg/dL Final     BUN/Creatinine Ratio   Date Value Ref Range Status   05/13/2022 18.5 7.0 - 25.0 Final     Calcium   Date Value Ref Range Status   05/13/2022 8.8 8.6 - 10.5 mg/dL Final   04/05/2018 8.5 8.1 - 10.2 mg/dL Final     eGFR Non  Amer   Date Value Ref Range Status   01/12/2022 78 >60 mL/min/1.73 Final     Alkaline Phosphatase   Date Value Ref Range Status   05/13/2022 53 39 - 117 U/L Final   04/05/2018 55 30 - 120 IU/L Final     Total Protein   Date Value Ref Range Status   05/13/2022 6.3 6.0 - 8.5 g/dL Final     ALT (SGPT)   Date Value Ref Range Status   05/13/2022 11 1 - 41 U/L Final   04/05/2018 10 (L) 17 - 63 IU/L Final     AST (SGOT)   Date Value Ref Range Status   05/13/2022 21 1 - 40 U/L Final   04/05/2018 17 15 - 41 IU/L Final     Total Bilirubin   Date Value Ref Range Status   05/13/2022 1.0 0.0 - 1.2 mg/dL Final   04/05/2018 0.42 0 - 1.50 mg/dL Final     Albumin   Date Value Ref Range Status   05/13/2022 3.70 3.50 - 5.20 g/dL Final   04/05/2018 3.8 3.4 - 5.0 g/dl Final     Globulin   Date Value Ref Range Status   05/13/2022 2.6 gm/dL Final     Lab Results   Component Value Date    WBC 3.61 05/13/2022    HGB 12.2 (L) 05/13/2022    HCT 34.9 (L) 05/13/2022    MCV 86.6 05/13/2022    PLT 70 (L) 05/13/2022     Lab Results   Component Value Date    NEUTROABS 2.26 05/13/2022    IRON 68 05/13/2022    IRON 72 01/12/2022    IRON 53 (L)  10/15/2021    TIBC 246 (L) 05/13/2022    TIBC 292 (L) 01/12/2022    TIBC 262 (L) 10/15/2021    LABIRON 28 05/13/2022    LABIRON 25 01/12/2022    LABIRON 20 10/15/2021    FERRITIN 187.80 05/13/2022    FERRITIN 224.20 01/12/2022    FERRITIN 207.50 10/15/2021    [id number] 671 05/13/2022    OOPEFFFP06 960 (H) 01/12/2022    CQOLLLTW20 541 10/15/2021    FOLATE 16.70 05/13/2022    FOLATE >20.00 01/12/2022    FOLATE >20.00 10/15/2021     Lab Results   Component Value Date    CEA 1.96 05/13/2022         PATHOLOGY:  * Cannot find OR log *         RADIOLOGY DATA :  No radiology results for the last 7 days        Assessment & Plan     1.  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 Goins was negative for malignancy.  Next colonoscopy recommended is in April 2022  - CEA level today is normal at 1.96  - We will have patient return to clinic in 4 months with repeat CBC, CMP, CEA, iron studies, ferritin, B12 folate to be done on that day  - Next surveillance CT of chest, abdomen and pelvis with contrast will be done around January 2023.    2.  Adenocarcinoma of transverse colon, stage III, T3 N1 M0 diagnosed in July 2020  - Had a surgery followed by FOLFOX that completed in March 2013    3.  Anemia:  - Hemoglobin is 12.2  - Recommend continuing with B12 injection monthly with folic acid p.o. daily.  - Anemia work-up done today shows adequate amount of iron.  No need to start any iron replacement at present.    4.  Thrombocytopenia  - Secondary to splenomegaly plus or minus ITP  - Platelet count today 70,000.  Will monitor with CBC    5.  Vitamin B12 deficiency  - Remains on monthly B12 injection at home    6.  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  Health maintenance: Patient does not smoke.  Had a colonoscopy in April 2019 by Dr. Goins    8. Advance Care Planning: For now patient remains  full code and is able to make decisions.  Patient has health care surrogate mentioned on chart.    9.  Prescriptions: Prescription for folic acid has been sent to his pharmacy today             PHQ-9 Total Score: 0   -Patient is not homicidal or suicidal.  No acute intervention required.    Sunny Ernandez reports a pain score of 0.  Given his pain assessment as noted, treatment options were discussed and the following options were decided upon as a follow-up plan to address the patient's pain: continuation of current treatment plan for pain.         Dany Baig MD  5/14/2022  10:54 CDT        Part of this note may be an electronic transcription/translation of spoken language to printed text using the Dragon Dictation System.          CC:

## 2022-05-16 ENCOUNTER — TELEPHONE (OUTPATIENT)
Dept: ONCOLOGY | Facility: HOSPITAL | Age: 82
End: 2022-05-16

## 2022-05-16 NOTE — TELEPHONE ENCOUNTER
----- Message from Dany Baig MD sent at 5/14/2022 10:56 AM CDT -----  Regarding: Labs  Please let patient know, his cancer to screen blood CEA was normal at 1.96.  Recommend continue taking B12 injection monthly and folic acid p.o. daily.  No need to start any iron replacement at present.  Thank you

## 2022-05-16 NOTE — TELEPHONE ENCOUNTER
Pt returns call and lab results given to him along with medication instructions as per Dr. Baig, v/u obtained.

## 2022-08-15 ENCOUNTER — PREP FOR SURGERY (OUTPATIENT)
Dept: OTHER | Facility: HOSPITAL | Age: 82
End: 2022-08-15

## 2022-08-15 DIAGNOSIS — Z85.038 PERSONAL HISTORY OF COLON CANCER: Primary | ICD-10-CM

## 2022-08-15 RX ORDER — DEXTROSE AND SODIUM CHLORIDE 5; .45 G/100ML; G/100ML
30 INJECTION, SOLUTION INTRAVENOUS CONTINUOUS PRN
Status: CANCELLED | OUTPATIENT
Start: 2022-08-30

## 2022-08-30 ENCOUNTER — ANESTHESIA EVENT (OUTPATIENT)
Dept: GASTROENTEROLOGY | Facility: HOSPITAL | Age: 82
End: 2022-08-30

## 2022-08-30 ENCOUNTER — ANESTHESIA (OUTPATIENT)
Dept: GASTROENTEROLOGY | Facility: HOSPITAL | Age: 82
End: 2022-08-30

## 2022-08-30 ENCOUNTER — HOSPITAL ENCOUNTER (OUTPATIENT)
Facility: HOSPITAL | Age: 82
Setting detail: HOSPITAL OUTPATIENT SURGERY
Discharge: HOME | End: 2022-08-30
Attending: INTERNAL MEDICINE | Admitting: INTERNAL MEDICINE

## 2022-08-30 VITALS
HEART RATE: 58 BPM | SYSTOLIC BLOOD PRESSURE: 95 MMHG | WEIGHT: 220.24 LBS | BODY MASS INDEX: 30.83 KG/M2 | OXYGEN SATURATION: 92 % | DIASTOLIC BLOOD PRESSURE: 88 MMHG | HEIGHT: 71 IN | RESPIRATION RATE: 18 BRPM | TEMPERATURE: 97.6 F

## 2022-08-30 DIAGNOSIS — Z85.038 PERSONAL HISTORY OF COLON CANCER: ICD-10-CM

## 2022-08-30 PROCEDURE — 25010000002 PROPOFOL 10 MG/ML EMULSION: Performed by: NURSE ANESTHETIST, CERTIFIED REGISTERED

## 2022-08-30 PROCEDURE — 88305 TISSUE EXAM BY PATHOLOGIST: CPT

## 2022-08-30 RX ORDER — PROPOFOL 10 MG/ML
VIAL (ML) INTRAVENOUS AS NEEDED
Status: DISCONTINUED | OUTPATIENT
Start: 2022-08-30 | End: 2022-08-30 | Stop reason: SURG

## 2022-08-30 RX ORDER — DEXTROSE AND SODIUM CHLORIDE 5; .45 G/100ML; G/100ML
30 INJECTION, SOLUTION INTRAVENOUS CONTINUOUS PRN
Status: DISCONTINUED | OUTPATIENT
Start: 2022-08-30 | End: 2022-08-30 | Stop reason: HOSPADM

## 2022-08-30 RX ADMIN — PROPOFOL 70 MG: 10 INJECTION, EMULSION INTRAVENOUS at 10:10

## 2022-08-30 RX ADMIN — PROPOFOL 20 MG: 10 INJECTION, EMULSION INTRAVENOUS at 10:12

## 2022-08-30 RX ADMIN — DEXTROSE AND SODIUM CHLORIDE 30 ML/HR: 5; 450 INJECTION, SOLUTION INTRAVENOUS at 09:46

## 2022-08-30 RX ADMIN — PROPOFOL 30 MG: 10 INJECTION, EMULSION INTRAVENOUS at 10:16

## 2022-08-30 NOTE — ANESTHESIA POSTPROCEDURE EVALUATION
Patient: Sunny Ernnadez    Procedure Summary     Date: 08/30/22 Room / Location: [facility] ENDOSCOPY 2 / [facility] ENDOSCOPY    Anesthesia Start: 1001 Anesthesia Stop: 1019    Procedure: COLONOSCOPY 10:00 (N/A ) Diagnosis:       Personal history of colon cancer      (Personal history of colon cancer [Z85.038])    Surgeons: Austin Goins DO Provider:     Anesthesia Type: MAC ASA Status: 3          Anesthesia Type: MAC    Vitals  No vitals data found for the desired time range.          Post Anesthesia Care and Evaluation    Patient location during evaluation: bedside  Patient participation: complete - patient participated  Level of consciousness: awake and awake and alert  Pain score: 0  Pain management: adequate    Airway patency: patent  Anesthetic complications: No anesthetic complications  PONV Status: none  Cardiovascular status: acceptable and stable  Respiratory status: acceptable, room air and spontaneous ventilation  Hydration status: acceptable    Comments: BP:  107/60  HR:  64  SAT:  95  RR:  18  TEMP:  97.8

## 2022-08-30 NOTE — ANESTHESIA PREPROCEDURE EVALUATION
Anesthesia Evaluation     Patient summary reviewed and Nursing notes reviewed   NPO Solid Status: > 8 hours  NPO Liquid Status: > 4 hours           Airway   Mallampati: II  TM distance: >3 FB  Neck ROM: limited  No difficulty expected  Dental    (+) lower dentures and upper dentures    Pulmonary     breath sounds clear to auscultation  Cardiovascular   Exercise tolerance: good (4-7 METS)    Rhythm: regular  Rate: normal    (+) hyperlipidemia,       Neuro/Psych  (+) numbness (Neuropathy due to chemotherapeutic drug ),    GI/Hepatic/Renal/Endo      Musculoskeletal     Abdominal    Substance History      OB/GYN          Other      history of cancer                      Anesthesia Plan    ASA 3     MAC       Anesthetic plan, risks, benefits, and alternatives have been provided, discussed and informed consent has been obtained with: patient.    Plan discussed with CRNA.

## 2022-08-31 LAB — REF LAB TEST METHOD: NORMAL

## 2022-09-12 NOTE — PROGRESS NOTES
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  Complains of chronic tingling and numbness affecting upper and lower extremity without any recent worsening.  Complains of fatigue.  Complains of worsening problem with urination despite of taking Flomax and Proscar in terms of weak stream, frequency and nocturia.  Denies any bleeding.  Denies any new lymph node enlargement denies any new abdominal pain or any change in bowel habits.  Complains of chronic arthralgia.      Past Medical History, Past Surgical History, Social History, Family History have been reviewed and are without significant changes except as mentioned.    Review of Systems   A comprehensive 14 point review of systems was performed and was negative except as mentioned in HPI.    Medications:  The current medication list was reviewed in the EMR    ALLERGIES:  No Known Allergies    Objective      Vitals:    09/16/22 1042   BP: 132/76   Pulse: 72   Temp: 97.5 °F (36.4 °C)   TempSrc: Temporal   SpO2: 95%   Weight: 104 kg (229 lb 1.6 oz)   PainSc: 0-No pain     Current Status 7/9/2021   ECOG score 1       Physical Exam  Pulmonary:      Breath sounds: Normal breath sounds.   Neurological:      Mental Status: He is alert and oriented to person, place, and time.           RECENT LABS:  Glucose   Date Value Ref Range Status   09/16/2022 93 65 - 99 mg/dL Final     Sodium   Date Value Ref Range Status   09/16/2022 141 136 - 145 mmol/L Final   04/05/2018 139 136 - 145 mmol/L Final      Potassium   Date Value Ref Range Status   09/16/2022 3.9 3.5 - 5.2 mmol/L Final   04/05/2018 4.1 3.3 - 5.0 mmol/L Final     CO2   Date Value Ref Range Status   09/16/2022 25.0 22.0 - 29.0 mmol/L Final     Total CO2   Date Value Ref Range Status   04/05/2018 25 20 - 31 mmol/L Final     Chloride   Date Value Ref Range Status   09/16/2022 107 98 - 107 mmol/L Final   04/05/2018 106 99 - 111 mmol/L Final     Anion Gap   Date Value Ref Range Status   09/16/2022 9.0 5.0 - 15.0 mmol/L Final     Creatinine   Date Value Ref Range Status   09/16/2022 1.00 0.76 - 1.27 mg/dL Final   04/05/2018 1.0 0.6 - 1.3 mg/dL Final     BUN   Date Value Ref Range Status   09/16/2022 16 8 - 23 mg/dL Final   04/05/2018 11 7.0 - 18.0 mg/dL Final     BUN/Creatinine Ratio   Date Value Ref Range Status   09/16/2022 16.0 7.0 - 25.0 Final     Calcium   Date Value Ref Range Status   09/16/2022 8.3 (L) 8.6 - 10.5 mg/dL Final   04/05/2018 8.5 8.1 - 10.2 mg/dL Final     eGFR Non  Amer   Date Value Ref Range Status   01/12/2022 78 >60 mL/min/1.73 Final     Alkaline Phosphatase   Date Value Ref Range Status   09/16/2022 52 39 - 117 U/L Final   04/05/2018 55 30 - 120 IU/L Final     Total Protein   Date Value Ref Range Status   09/16/2022 6.3 6.0 - 8.5 g/dL Final     ALT (SGPT)   Date Value Ref Range Status   09/16/2022 12 1 - 41 U/L Final   04/05/2018 10 (L) 17 - 63 IU/L Final     AST (SGOT)   Date Value Ref Range Status   09/16/2022 20 1 - 40 U/L Final   04/05/2018 17 15 - 41 IU/L Final     Total Bilirubin   Date Value Ref Range Status   09/16/2022 1.1 0.0 - 1.2 mg/dL Final   04/05/2018 0.42 0 - 1.50 mg/dL Final     Albumin   Date Value Ref Range Status   09/16/2022 3.90 3.50 - 5.20 g/dL Final   04/05/2018 3.8 3.4 - 5.0 g/dl Final     Globulin   Date Value Ref Range Status   09/16/2022 2.4 gm/dL Final     Lab Results   Component Value Date    WBC 3.26 (L) 09/16/2022    HGB 11.7 (L) 09/16/2022    HCT 34.9 (L) 09/16/2022    MCV 89.5 09/16/2022     PLT 69 (L) 2022     Lab Results   Component Value Date    NEUTROABS 2.09 2022    IRON 60 2022    IRON 68 2022    IRON 72 2022    TIBC 270 (L) 2022    TIBC 246 (L) 2022    TIBC 292 (L) 2022    LABIRON 22 2022    LABIRON 28 2022    LABIRON 25 2022    FERRITIN 210.50 2022    FERRITIN 187.80 2022    FERRITIN 224.20 2022    [id number] 671 2022    [id number] 960 (H) 2022    ADVFTIQU18 541 10/15/2021    FOLATE 16.70 2022    FOLATE >20.00 2022    FOLATE >20.00 10/15/2021     Lab Results   Component Value Date    CEA 1.96 2022    REFLABREPO  2022     [facility]  [address] [place] [address]  Phone: [phone] or [phone]  Fax: [phone]  Aubrey Michaud M.D., Medical Director    PATIENT NAME                                     LABORATORY NO.  OSEAS AJCKSON.                       [id number]  [id number]                                 AGE                    SEX   N              CLIENT REF #  [facility]                   82        1940           [id number]      [id number]    [place]                               REQUESTING M.D.           ATTENDING M.D.         COPY TO[address] AGUSTO ANDERSON[address]  DATE COLLECTED            DATE RECEIVED          DATE REPORTED  2022    DIAGNOSIS:  TRANSVERSE COLON POLYP:  Tubular adenoma    JBS/abby    CLINICAL HISTORY:  Personal history of colon cancer    SPECIMENS RECEIVED:  TRANSVERSE COLON POLYP    MICROSCOPIC DESCRIPTION:  Tissue blocks are prepared and slides are examined microscopically on all  specimens. See diagnosis for details.    Professional interpretation rendered by Dagoberto Ford,  "M.D. at [facility], [address] [place] [id number].    GROSS DESCRIPTION:  Specimen is received in 1 formalin filled container \"transverse polyp biopsy\"  and consists of a single piece of tan soft tissue measuring 0.3 x 0.2 x 0.2 cm.  Specimen is submitted entirely in 1 cassette.  MM    REVIEWED, DIAGNOSED AND ELECTRONICALLY  SIGNED BY:    Dagoberto Ford M.D.  CPT CODES:  43905           PATHOLOGY:  * Cannot find OR log *         RADIOLOGY DATA :  No radiology results for the last 7 days        Assessment & Plan     1.  Adenocarcinoma of cecum stage II, T3 N0 M0 with 22 lymph node negative diagnosed in April 2018  - Has been on surveillance since surgery  - Colonoscopy on August 30, 2022 was negative for malignancy  -CEA level is 1.99  - We will continue with clinical surveillance.  - Patient will return to clinic in 4 months with repeat CBC, CMP, CEA, iron studies, ferritin, B12, folate and CT of chest abdomen and pelvis with contrast to be done prior to that.    2.  Adenocarcinoma of transverse colon, stage III, T3 N1 M0 diagnosed in July 2020  - Had a surgery followed by FOLFOX that completed in March 2013    3.  Anemia:  - Hemoglobin is 11.9.  Iron studies are adequate no need for any iron replacement  - Recommend continuing with B12 injection once a month with folic acid p.o. daily    4.  Thrombocytopenia:  -Secondary to splenomegaly plus or minus ITP  - Platelet count today is stable at 69,000.  Patient denies any bleeding.  - We will monitor with CBC    5. vitamin B12 deficiency:  - Remains on B12 injection monthly at home    6.  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  Leukopenia:  - Likely secondary to splenomegaly  - White blood cell count is 3.26 with absolute neutrophil count of 2.09.  Will monitor with CBC    8.  Prostate enlargement with symptoms:  - Patient is currently on Flomax and Proscar without any significant improvement.  - Option of urology referral for " further evaluation was discussed with patient and his family.  Patient is requesting referral for Dr. Calix.  - Referral has been placed today.    9.  Health maintenance: Patient does not smoke.  Had a colonoscopy in August 2022 by Dr. Goins    10. Advance Care Planning: For now patient remains full code and is able to make decisions.  Patient has health care surrogate mentioned on chart.                 PHQ-9 Total Score: 2   -Patient is not homicidal or suicidal.  No acute intervention required.    Sunny Ernandez reports a pain score of 0.  Given his pain assessment as noted, treatment options were discussed and the following options were decided upon as a follow-up plan to address the patient's pain: continuation of current treatment plan for pain.         Dany Baig MD  9/16/2022  10:57 CDT        Part of this note may be an electronic transcription/translation of spoken language to printed text using the Dragon Dictation System.          CC:

## 2022-09-16 ENCOUNTER — LAB (OUTPATIENT)
Dept: ONCOLOGY | Facility: HOSPITAL | Age: 82
End: 2022-09-16

## 2022-09-16 ENCOUNTER — OFFICE VISIT (OUTPATIENT)
Dept: ONCOLOGY | Facility: CLINIC | Age: 82
End: 2022-09-16

## 2022-09-16 VITALS
OXYGEN SATURATION: 95 % | TEMPERATURE: 97.5 F | DIASTOLIC BLOOD PRESSURE: 76 MMHG | WEIGHT: 229.1 LBS | BODY MASS INDEX: 31.95 KG/M2 | HEART RATE: 72 BPM | SYSTOLIC BLOOD PRESSURE: 132 MMHG

## 2022-09-16 DIAGNOSIS — N40.1 BENIGN PROSTATIC HYPERPLASIA WITH WEAK URINARY STREAM: ICD-10-CM

## 2022-09-16 DIAGNOSIS — G62.0 NEUROPATHY DUE TO CHEMOTHERAPEUTIC DRUG: ICD-10-CM

## 2022-09-16 DIAGNOSIS — D69.6 THROMBOCYTOPENIA: Chronic | ICD-10-CM

## 2022-09-16 DIAGNOSIS — D64.9 ANEMIA, UNSPECIFIED TYPE: Chronic | ICD-10-CM

## 2022-09-16 DIAGNOSIS — C18.2 MALIGNANT NEOPLASM OF ASCENDING COLON: Primary | Chronic | ICD-10-CM

## 2022-09-16 DIAGNOSIS — T45.1X5A NEUROPATHY DUE TO CHEMOTHERAPEUTIC DRUG: Chronic | ICD-10-CM

## 2022-09-16 DIAGNOSIS — R39.12 BENIGN PROSTATIC HYPERPLASIA WITH WEAK URINARY STREAM: ICD-10-CM

## 2022-09-16 DIAGNOSIS — T45.1X5A NEUROPATHY DUE TO CHEMOTHERAPEUTIC DRUG: ICD-10-CM

## 2022-09-16 DIAGNOSIS — G62.0 NEUROPATHY DUE TO CHEMOTHERAPEUTIC DRUG: Chronic | ICD-10-CM

## 2022-09-16 DIAGNOSIS — C18.2 MALIGNANT NEOPLASM OF ASCENDING COLON: ICD-10-CM

## 2022-09-16 DIAGNOSIS — D64.9 ANEMIA, UNSPECIFIED TYPE: ICD-10-CM

## 2022-09-16 DIAGNOSIS — D69.6 THROMBOCYTOPENIA: ICD-10-CM

## 2022-09-16 DIAGNOSIS — E53.8 B12 DEFICIENCY: Chronic | ICD-10-CM

## 2022-09-16 DIAGNOSIS — D72.819 LEUKOPENIA, UNSPECIFIED TYPE: ICD-10-CM

## 2022-09-16 DIAGNOSIS — E53.8 B12 DEFICIENCY: ICD-10-CM

## 2022-09-16 LAB
ALBUMIN SERPL-MCNC: 3.9 G/DL (ref 3.5–5.2)
ALBUMIN/GLOB SERPL: 1.6 G/DL
ALP SERPL-CCNC: 52 U/L (ref 39–117)
ALT SERPL W P-5'-P-CCNC: 12 U/L (ref 1–41)
ANION GAP SERPL CALCULATED.3IONS-SCNC: 9 MMOL/L (ref 5–15)
AST SERPL-CCNC: 20 U/L (ref 1–40)
BASOPHILS # BLD AUTO: 0.02 10*3/MM3 (ref 0–0.2)
BASOPHILS NFR BLD AUTO: 0.6 % (ref 0–1.5)
BILIRUB SERPL-MCNC: 1.1 MG/DL (ref 0–1.2)
BUN SERPL-MCNC: 16 MG/DL (ref 8–23)
BUN/CREAT SERPL: 16 (ref 7–25)
CALCIUM SPEC-SCNC: 8.3 MG/DL (ref 8.6–10.5)
CEA SERPL-MCNC: 1.99 NG/ML
CHLORIDE SERPL-SCNC: 107 MMOL/L (ref 98–107)
CO2 SERPL-SCNC: 25 MMOL/L (ref 22–29)
CREAT SERPL-MCNC: 1 MG/DL (ref 0.76–1.27)
DEPRECATED RDW RBC AUTO: 47.3 FL (ref 37–54)
EGFRCR SERPLBLD CKD-EPI 2021: 75.1 ML/MIN/1.73
EOSINOPHIL # BLD AUTO: 0.11 10*3/MM3 (ref 0–0.4)
EOSINOPHIL NFR BLD AUTO: 3.4 % (ref 0.3–6.2)
ERYTHROCYTE [DISTWIDTH] IN BLOOD BY AUTOMATED COUNT: 14.6 % (ref 12.3–15.4)
FERRITIN SERPL-MCNC: 210.5 NG/ML (ref 30–400)
FOLATE SERPL-MCNC: >20 NG/ML (ref 4.78–24.2)
GLOBULIN UR ELPH-MCNC: 2.4 GM/DL
GLUCOSE SERPL-MCNC: 93 MG/DL (ref 65–99)
HCT VFR BLD AUTO: 34.9 % (ref 37.5–51)
HGB BLD-MCNC: 11.7 G/DL (ref 13–17.7)
IMM GRANULOCYTES # BLD AUTO: 0.04 10*3/MM3 (ref 0–0.05)
IMM GRANULOCYTES NFR BLD AUTO: 1.2 % (ref 0–0.5)
IRON 24H UR-MRATE: 60 MCG/DL (ref 59–158)
IRON SATN MFR SERPL: 22 % (ref 20–50)
LYMPHOCYTES # BLD AUTO: 0.67 10*3/MM3 (ref 0.7–3.1)
LYMPHOCYTES NFR BLD AUTO: 20.6 % (ref 19.6–45.3)
MCH RBC QN AUTO: 30 PG (ref 26.6–33)
MCHC RBC AUTO-ENTMCNC: 33.5 G/DL (ref 31.5–35.7)
MCV RBC AUTO: 89.5 FL (ref 79–97)
MONOCYTES # BLD AUTO: 0.33 10*3/MM3 (ref 0.1–0.9)
MONOCYTES NFR BLD AUTO: 10.1 % (ref 5–12)
NEUTROPHILS NFR BLD AUTO: 2.09 10*3/MM3 (ref 1.7–7)
NEUTROPHILS NFR BLD AUTO: 64.1 % (ref 42.7–76)
NRBC BLD AUTO-RTO: 0 /100 WBC (ref 0–0.2)
PLATELET # BLD AUTO: 69 10*3/MM3 (ref 140–450)
PMV BLD AUTO: 11.9 FL (ref 6–12)
POTASSIUM SERPL-SCNC: 3.9 MMOL/L (ref 3.5–5.2)
PROT SERPL-MCNC: 6.3 G/DL (ref 6–8.5)
RBC # BLD AUTO: 3.9 10*6/MM3 (ref 4.14–5.8)
SODIUM SERPL-SCNC: 141 MMOL/L (ref 136–145)
TIBC SERPL-MCNC: 270 MCG/DL (ref 298–536)
TRANSFERRIN SERPL-MCNC: 181 MG/DL (ref 200–360)
VIT B12 BLD-MCNC: >2000 PG/ML (ref 211–946)
WBC NRBC COR # BLD: 3.26 10*3/MM3 (ref 3.4–10.8)

## 2022-09-16 PROCEDURE — 84466 ASSAY OF TRANSFERRIN: CPT

## 2022-09-16 PROCEDURE — G0463 HOSPITAL OUTPT CLINIC VISIT: HCPCS | Performed by: INTERNAL MEDICINE

## 2022-09-16 PROCEDURE — 82746 ASSAY OF FOLIC ACID SERUM: CPT

## 2022-09-16 PROCEDURE — 82378 CARCINOEMBRYONIC ANTIGEN: CPT

## 2022-09-16 PROCEDURE — 82728 ASSAY OF FERRITIN: CPT

## 2022-09-16 PROCEDURE — 1126F AMNT PAIN NOTED NONE PRSNT: CPT | Performed by: INTERNAL MEDICINE

## 2022-09-16 PROCEDURE — 83540 ASSAY OF IRON: CPT

## 2022-09-16 PROCEDURE — 99214 OFFICE O/P EST MOD 30 MIN: CPT | Performed by: INTERNAL MEDICINE

## 2022-09-16 PROCEDURE — 80053 COMPREHEN METABOLIC PANEL: CPT

## 2022-09-16 PROCEDURE — 85025 COMPLETE CBC W/AUTO DIFF WBC: CPT

## 2022-09-16 PROCEDURE — 82607 VITAMIN B-12: CPT

## 2022-09-16 PROCEDURE — 1123F ACP DISCUSS/DSCN MKR DOCD: CPT | Performed by: INTERNAL MEDICINE

## 2022-09-16 RX ORDER — FINASTERIDE 5 MG/1
1 TABLET, FILM COATED ORAL DAILY
COMMUNITY
Start: 2022-09-06

## 2022-09-16 RX ORDER — CYANOCOBALAMIN 1000 UG/ML
INJECTION, SOLUTION INTRAMUSCULAR; SUBCUTANEOUS
COMMUNITY
Start: 2022-07-12 | End: 2022-09-16

## 2022-09-16 RX ORDER — POLYETHYLENE GLYCOL 3350 17 G/17G
POWDER, FOR SOLUTION ORAL
COMMUNITY
Start: 2022-08-11 | End: 2022-12-30

## 2022-09-19 ENCOUNTER — TELEPHONE (OUTPATIENT)
Dept: ONCOLOGY | Facility: HOSPITAL | Age: 82
End: 2022-09-19

## 2022-09-19 NOTE — TELEPHONE ENCOUNTER
----- Message from Dany Baig MD sent at 9/17/2022 10:10 AM CDT -----  Regarding: Labs  Please let patient know, his iron level is adequate no need for any intravenous iron replacement at present.  Recommend continue with monthly B12 injection and folic acid he can decrease it to 3 times a week on Monday Wednesday and Friday.  CEA level was normal at 1.99.  Thank you

## 2022-09-19 NOTE — TELEPHONE ENCOUNTER
Called and spoke with pt regarding lab results and medication instructions as per Dr. Baig, v/u obtained.

## 2022-11-22 ENCOUNTER — PREP FOR SURGERY (OUTPATIENT)
Dept: OTHER | Facility: HOSPITAL | Age: 82
End: 2022-11-22

## 2022-11-22 DIAGNOSIS — N48.1 BALANITIS: Primary | ICD-10-CM

## 2022-12-30 ENCOUNTER — PRE-ADMISSION TESTING (OUTPATIENT)
Dept: PREADMISSION TESTING | Facility: HOSPITAL | Age: 82
End: 2022-12-30
Payer: MEDICARE

## 2022-12-30 VITALS
OXYGEN SATURATION: 95 % | DIASTOLIC BLOOD PRESSURE: 60 MMHG | HEART RATE: 87 BPM | WEIGHT: 233 LBS | BODY MASS INDEX: 32.62 KG/M2 | SYSTOLIC BLOOD PRESSURE: 120 MMHG | HEIGHT: 71 IN | RESPIRATION RATE: 16 BRPM

## 2022-12-30 DIAGNOSIS — N48.1 BALANITIS: ICD-10-CM

## 2022-12-30 LAB
BILIRUB UR QL STRIP: NEGATIVE
CLARITY UR: ABNORMAL
COLOR UR: ABNORMAL
GLUCOSE UR STRIP-MCNC: NEGATIVE MG/DL
HGB UR QL STRIP.AUTO: NEGATIVE
KETONES UR QL STRIP: ABNORMAL
LEUKOCYTE ESTERASE UR QL STRIP.AUTO: ABNORMAL
NITRITE UR QL STRIP: NEGATIVE
PH UR STRIP.AUTO: 5.5 [PH] (ref 5–9)
PROT UR QL STRIP: ABNORMAL
SP GR UR STRIP: 1.03 (ref 1–1.03)
UROBILINOGEN UR QL STRIP: ABNORMAL

## 2022-12-30 PROCEDURE — 81003 URINALYSIS AUTO W/O SCOPE: CPT

## 2022-12-30 RX ORDER — CYANOCOBALAMIN 1000 UG/ML
1000 INJECTION, SOLUTION INTRAMUSCULAR; SUBCUTANEOUS
COMMUNITY

## 2022-12-30 RX ORDER — FOLIC ACID 1 MG/1
1 TABLET ORAL 3 TIMES WEEKLY
COMMUNITY
End: 2023-01-16

## 2022-12-30 RX ORDER — SODIUM CHLORIDE, SODIUM GLUCONATE, SODIUM ACETATE, POTASSIUM CHLORIDE AND MAGNESIUM CHLORIDE 526; 502; 368; 37; 30 MG/100ML; MG/100ML; MG/100ML; MG/100ML; MG/100ML
1000 INJECTION, SOLUTION INTRAVENOUS CONTINUOUS PRN
Status: CANCELLED | OUTPATIENT
Start: 2023-01-04

## 2023-01-03 ENCOUNTER — ANESTHESIA EVENT (OUTPATIENT)
Dept: PERIOP | Facility: HOSPITAL | Age: 83
End: 2023-01-03
Payer: MEDICARE

## 2023-01-03 NOTE — H&P
UROLOGY [facility] History and Physical  OSEAS MEIER  82-year-old; :1940;;male  [address];[place] [address]  Ins: 1) [employer]ANNY 2) [employer]  MRN:[id number]  SARI VEGA MD  UROLOGY [facility]  Allergies  No known allergies  Current Medications  SIMVASTATIN 40MG TAB 40.000  TAMSULOSIN 0.4MG CAP 0.400  Vitamin B Complex injectable solution  Vitamin D2 1.25 mg (50,000 intl units) oral capsule  Adult Aspirin Regimen 81 mg oral delayed release tablet  FOLIC ACID 1MG TAB TAKE 1 TABLET BY MOUTH ONCE DAILY  * Medications Reconciled  Problem List  Benign prostatic hyperplasia with outflow obstruction 2022  Phimosis 2022  Balanitis 2022  Medical History  Malignant neoplasm of colon  HAS HAD COLONSCOPY IN LAST 9 YEARS  Surgical History  Other COLON CANCER   Psychiatric History  Patient is generally satisfied with life. No depression, anxiety or thoughts of suicide.  Family History  Neoplasm of lung MOM  Mother   Father   Brother   Brother Alive  Sister Alive  Social History  DOESNT DIRNK. UNEMPLOYED. . LIVES WITH GRANDSON.  Notes  PRE DIAGNOSIS: BPH  POST DIAGNOSIS: Phimosis, BPH  ANESTHESIA: Under sterile conditions, Xylocaine jelly was inserted into the urethra for local anesthesia.  PROCEDURE DETAILS:  The history, physical findings, current medications, and indications were reviewed prior to the procedure. I discussed the procedure with the patient, including possible complications. Patient was prepped and draped in the usual fashion.  Penis: SEVERE PHIMOSIS  Urethra: No abnormalities of the urethra are noted.  Prostate: Prostate fossa is OBSTRUCTING.  Bladder: Bladder shows no abnormalities. No evidence of lesion.  No stones noted. Normal bladder expansion.  Ureter: Clear efflux noted both orifices. Orifices normal  configuration and location.  The cystoscope was removed. The patient tolerated  the  procedure well.  COMPLICATIONS: No complications.  FINDINGS: PHIMOSIS, BPH  INSTRUCTIONS:Appropriate post procedure instructions were given to patient. Verbalized understanding.  Chief Complaint  Cystoscopy  History of Present Illness  HERE TODAY FOR CYSTOSCOPY. Complains of dysuria, nocturia, and urgency. Stream is not always strong and there are times when he does not feel like he is emptying his bladder well.  Location: Penis. Prostate.  Severity: Mild-moderate  Onset / Duration: Greater than one year  Modifying factors: UTI  Associated signs and symptoms: No fever  Review of Systems  Eyes:Denies: blurry vision, pain in the eyes and double vision  Vital Signs  VITAL SIGNS NOT TAKEN DUE TO COVID 19 RESTRICTIONS  DATE: 11/21/2022 03:28 PM  Weight: 220 (lb) Resp Rate: 18 (/min)  Height: 71 (in) BMI: 30.68  Former smoker  Exam  General appearance: The patient appears well developed and well nourished, in no  apparent acute distress.  Examination of pupils and irises: Normal.  Assessment of hearing: Normal.  Examination of neck: Neck appears supple.  Assessment of respiratory effort: Respiratory effort appears normal. No apparent  distress.  Obtain stool sample: Stool specimen for occult blood is not indicated.  Examination of gait and station: Normal.  Inspection of skin and subcutaneous tissue: Normal.  Orientation to time, place and person: Normal.  Recent and remote memory: Normal.  Mood and affect: Normal  Data Review  Medications and chart reviewed. History and physical form/ROS reviewed and no changes noted. Previous encounter reviewed. Last form done 10/18/2022.  Assessment - Benign prostatic hyperplasia with outflow obstruction  DX:  Benign prostatic hyperplasia with outflow obstruction  ICD-10: N40.1  Phimosis  ICD-10: N47.1  Balanitis  ICD-10: N48.1  Plan  Plan Notes:  Circumcision  Procedures:  56320 CYSTOSCOPY  Discussion:  Discussed my findings and plan of action and reasoning behind decision  making. All questions were answered. FINDINGS WERE DISCUSSED WITH PATIENT. RISKS AND BENEFITS EXPLAINED. PATIENT WISHES TO PROCEED.  Instructions:  Patient is instructed to call with any problems. Patient is instructed to call if the condition worsens. Patient is instructed to call with any changes in condition.

## 2023-01-04 ENCOUNTER — ANESTHESIA (OUTPATIENT)
Dept: PERIOP | Facility: HOSPITAL | Age: 83
End: 2023-01-04
Payer: MEDICARE

## 2023-01-04 ENCOUNTER — HOSPITAL ENCOUNTER (OUTPATIENT)
Facility: HOSPITAL | Age: 83
Setting detail: HOSPITAL OUTPATIENT SURGERY
Discharge: HOME | End: 2023-01-04
Attending: UROLOGY | Admitting: UROLOGY
Payer: MEDICARE

## 2023-01-04 VITALS
WEIGHT: 232.59 LBS | BODY MASS INDEX: 32.56 KG/M2 | OXYGEN SATURATION: 95 % | HEART RATE: 69 BPM | DIASTOLIC BLOOD PRESSURE: 74 MMHG | SYSTOLIC BLOOD PRESSURE: 122 MMHG | HEIGHT: 71 IN | TEMPERATURE: 97.4 F | RESPIRATION RATE: 18 BRPM

## 2023-01-04 DIAGNOSIS — N48.1 BALANITIS: ICD-10-CM

## 2023-01-04 PROCEDURE — 25010000002 PHENYLEPHRINE 10 MG/ML SOLUTION: Performed by: NURSE ANESTHETIST, CERTIFIED REGISTERED

## 2023-01-04 PROCEDURE — 0 LIDOCAINE 1 % SOLUTION: Performed by: UROLOGY

## 2023-01-04 PROCEDURE — 25010000002 CEFTRIAXONE PER 250 MG: Performed by: UROLOGY

## 2023-01-04 PROCEDURE — 25010000002 FENTANYL CITRATE (PF) 50 MCG/ML SOLUTION: Performed by: NURSE ANESTHETIST, CERTIFIED REGISTERED

## 2023-01-04 PROCEDURE — 25010000002 PROPOFOL 200 MG/20ML EMULSION: Performed by: NURSE ANESTHETIST, CERTIFIED REGISTERED

## 2023-01-04 PROCEDURE — 88304 TISSUE EXAM BY PATHOLOGIST: CPT

## 2023-01-04 RX ORDER — EPHEDRINE SULFATE 50 MG/ML
INJECTION INTRAVENOUS AS NEEDED
Status: DISCONTINUED | OUTPATIENT
Start: 2023-01-04 | End: 2023-01-04 | Stop reason: SURG

## 2023-01-04 RX ORDER — LEVOFLOXACIN 250 MG/1
250 TABLET ORAL DAILY
Qty: 7 TABLET | Refills: 0 | Status: SHIPPED | OUTPATIENT
Start: 2023-01-04 | End: 2023-01-11

## 2023-01-04 RX ORDER — ACETAMINOPHEN 325 MG/1
650 TABLET ORAL ONCE AS NEEDED
Status: DISCONTINUED | OUTPATIENT
Start: 2023-01-04 | End: 2023-01-04 | Stop reason: HOSPADM

## 2023-01-04 RX ORDER — OXYCODONE AND ACETAMINOPHEN 7.5; 325 MG/1; MG/1
1 TABLET ORAL EVERY 6 HOURS PRN
Qty: 10 TABLET | Refills: 0 | Status: SHIPPED | OUTPATIENT
Start: 2023-01-04 | End: 2023-01-11

## 2023-01-04 RX ORDER — LIDOCAINE HYDROCHLORIDE 10 MG/ML
INJECTION, SOLUTION EPIDURAL; INFILTRATION; INTRACAUDAL; PERINEURAL AS NEEDED
Status: DISCONTINUED | OUTPATIENT
Start: 2023-01-04 | End: 2023-01-04 | Stop reason: SURG

## 2023-01-04 RX ORDER — PROMETHAZINE HYDROCHLORIDE 25 MG/1
25 TABLET ORAL ONCE AS NEEDED
Status: DISCONTINUED | OUTPATIENT
Start: 2023-01-04 | End: 2023-01-04 | Stop reason: HOSPADM

## 2023-01-04 RX ORDER — ONDANSETRON 2 MG/ML
4 INJECTION INTRAMUSCULAR; INTRAVENOUS ONCE AS NEEDED
Status: DISCONTINUED | OUTPATIENT
Start: 2023-01-04 | End: 2023-01-04 | Stop reason: HOSPADM

## 2023-01-04 RX ORDER — EPHEDRINE SULFATE 50 MG/ML
5 INJECTION, SOLUTION INTRAVENOUS ONCE AS NEEDED
Status: DISCONTINUED | OUTPATIENT
Start: 2023-01-04 | End: 2023-01-04 | Stop reason: HOSPADM

## 2023-01-04 RX ORDER — LIDOCAINE HYDROCHLORIDE 10 MG/ML
INJECTION, SOLUTION INFILTRATION; PERINEURAL AS NEEDED
Status: DISCONTINUED | OUTPATIENT
Start: 2023-01-04 | End: 2023-01-04 | Stop reason: HOSPADM

## 2023-01-04 RX ORDER — FLUMAZENIL 0.1 MG/ML
0.2 INJECTION INTRAVENOUS AS NEEDED
Status: DISCONTINUED | OUTPATIENT
Start: 2023-01-04 | End: 2023-01-04 | Stop reason: HOSPADM

## 2023-01-04 RX ORDER — PROMETHAZINE HYDROCHLORIDE 25 MG/1
25 SUPPOSITORY RECTAL ONCE AS NEEDED
Status: DISCONTINUED | OUTPATIENT
Start: 2023-01-04 | End: 2023-01-04 | Stop reason: HOSPADM

## 2023-01-04 RX ORDER — NALOXONE HCL 0.4 MG/ML
0.4 VIAL (ML) INJECTION AS NEEDED
Status: DISCONTINUED | OUTPATIENT
Start: 2023-01-04 | End: 2023-01-04 | Stop reason: HOSPADM

## 2023-01-04 RX ORDER — ACETAMINOPHEN 650 MG/1
650 SUPPOSITORY RECTAL ONCE AS NEEDED
Status: DISCONTINUED | OUTPATIENT
Start: 2023-01-04 | End: 2023-01-04 | Stop reason: HOSPADM

## 2023-01-04 RX ORDER — PROPOFOL 10 MG/ML
INJECTION, EMULSION INTRAVENOUS AS NEEDED
Status: DISCONTINUED | OUTPATIENT
Start: 2023-01-04 | End: 2023-01-04 | Stop reason: SURG

## 2023-01-04 RX ORDER — PHENYLEPHRINE HYDROCHLORIDE 10 MG/ML
INJECTION INTRAVENOUS AS NEEDED
Status: DISCONTINUED | OUTPATIENT
Start: 2023-01-04 | End: 2023-01-04 | Stop reason: SURG

## 2023-01-04 RX ORDER — DIPHENHYDRAMINE HYDROCHLORIDE 50 MG/ML
12.5 INJECTION INTRAMUSCULAR; INTRAVENOUS
Status: DISCONTINUED | OUTPATIENT
Start: 2023-01-04 | End: 2023-01-04 | Stop reason: HOSPADM

## 2023-01-04 RX ORDER — MEPERIDINE HYDROCHLORIDE 25 MG/ML
12.5 INJECTION INTRAMUSCULAR; INTRAVENOUS; SUBCUTANEOUS
Status: DISCONTINUED | OUTPATIENT
Start: 2023-01-04 | End: 2023-01-04 | Stop reason: HOSPADM

## 2023-01-04 RX ORDER — SODIUM CHLORIDE, SODIUM GLUCONATE, SODIUM ACETATE, POTASSIUM CHLORIDE AND MAGNESIUM CHLORIDE 526; 502; 368; 37; 30 MG/100ML; MG/100ML; MG/100ML; MG/100ML; MG/100ML
1000 INJECTION, SOLUTION INTRAVENOUS CONTINUOUS PRN
Status: DISCONTINUED | OUTPATIENT
Start: 2023-01-04 | End: 2023-01-04 | Stop reason: HOSPADM

## 2023-01-04 RX ORDER — FENTANYL CITRATE 50 UG/ML
INJECTION, SOLUTION INTRAMUSCULAR; INTRAVENOUS AS NEEDED
Status: DISCONTINUED | OUTPATIENT
Start: 2023-01-04 | End: 2023-01-04 | Stop reason: SURG

## 2023-01-04 RX ADMIN — EPHEDRINE SULFATE 10 MG: 50 INJECTION INTRAVENOUS at 10:52

## 2023-01-04 RX ADMIN — PROPOFOL 150 MG: 10 INJECTION, EMULSION INTRAVENOUS at 10:30

## 2023-01-04 RX ADMIN — FENTANYL CITRATE 25 MCG: 50 INJECTION, SOLUTION INTRAMUSCULAR; INTRAVENOUS at 10:42

## 2023-01-04 RX ADMIN — PROPOFOL 20 MG: 10 INJECTION, EMULSION INTRAVENOUS at 10:36

## 2023-01-04 RX ADMIN — PHENYLEPHRINE HYDROCHLORIDE 100 MCG: 10 INJECTION, SOLUTION INTRAVENOUS at 10:48

## 2023-01-04 RX ADMIN — PROPOFOL 30 MG: 10 INJECTION, EMULSION INTRAVENOUS at 10:33

## 2023-01-04 RX ADMIN — PHENYLEPHRINE HYDROCHLORIDE 100 MCG: 10 INJECTION, SOLUTION INTRAVENOUS at 10:43

## 2023-01-04 RX ADMIN — PROPOFOL 50 MG: 10 INJECTION, EMULSION INTRAVENOUS at 10:50

## 2023-01-04 RX ADMIN — SODIUM CHLORIDE, SODIUM GLUCONATE, SODIUM ACETATE, POTASSIUM CHLORIDE AND MAGNESIUM CHLORIDE 1000 ML: 526; 502; 368; 37; 30 INJECTION, SOLUTION INTRAVENOUS at 08:30

## 2023-01-04 RX ADMIN — LIDOCAINE HYDROCHLORIDE 30 MG: 10 INJECTION, SOLUTION EPIDURAL; INFILTRATION; INTRACAUDAL; PERINEURAL at 10:30

## 2023-01-04 NOTE — DISCHARGE INSTRUCTIONS
****REMOVE DRESSING IN 3 DAYS*******    *******FOLLOW UP WITH DR. VEGA ON Monday AND LEAVE CATH IN UNTIL SEEN IN OFFICE****

## 2023-01-04 NOTE — ANESTHESIA PROCEDURE NOTES
Airway  Date/Time: 1/4/2023 10:32 AM    General Information and Staff    Patient location during procedure: OR  CRNA/CAA: Abimbola Burger CRNA  SRNA: Iza Almazan SRNA  Indications and Patient Condition  Indications for airway management: airway protection    Preoxygenated: yes  MILS maintained throughout  Mask difficulty assessment: 0 - not attempted    Final Airway Details  Final airway type: supraglottic airway      Successful airway: I-gel  Size 4     Cormack-Lehane Classification: grade I - full view of glottis  Number of attempts at approach: 1  Assessment: lips, teeth, and gum same as pre-op

## 2023-01-04 NOTE — ANESTHESIA POSTPROCEDURE EVALUATION
Patient: Sunny Ernandez    Procedure Summary     Date: 08/30/22 Room / Location: [facility] ENDOSCOPY 2 / [facility] ENDOSCOPY    Anesthesia Start: 1001 Anesthesia Stop: 1019    Procedure: COLONOSCOPY 10:00 (N/A ) Diagnosis:       Personal history of colon cancer      (Personal history of colon cancer [Z85.038])    Surgeons: Austin Goins DO Provider:     Anesthesia Type: MAC ASA Status: 3          Anesthesia Type: general    Vitals  Vitals Value Taken Time   /58 01/04/23 1144   Temp 97.7 °F (36.5 °C) 01/04/23 1144   Pulse 73 01/04/23 1144   Resp 20 01/04/23 1144   SpO2 93 % 01/04/23 1144           Post Anesthesia Care and Evaluation    Patient location during evaluation: PACU  Patient participation: complete - patient participated  Level of consciousness: awake  Pain score: 0  Pain management: adequate    Airway patency: patent  Anesthetic complications: No anesthetic complications  PONV Status: none  Cardiovascular status: acceptable, stable and hemodynamically stable  Respiratory status: acceptable, room air and spontaneous ventilation  Hydration status: acceptable    Comments: BP:  123/58  HR:  73  SAT:  95  RR:  18  TEMP:  97.7

## 2023-01-04 NOTE — ANESTHESIA PREPROCEDURE EVALUATION
Anesthesia Evaluation     Patient summary reviewed and Nursing notes reviewed   NPO Solid Status: > 8 hours  NPO Liquid Status: > 2 hours           Airway   Mallampati: II  TM distance: >3 FB  Neck ROM: full  possible difficult intubation  Comment: Indications and Patient Condition  Indications for airway management: airway protection    Preoxygenated: yes  MILS maintained throughout  Mask difficulty assessment: 0 - not attempted     Final Airway Details  Final airway type: endotracheal airway        Successful airway: ETT  Cuffed: yes   Successful intubation technique: direct laryngoscopy  Blade: Noelle  Blade size: #3  ETT size: 8.0 mm  Cormack-Lehane Classification: grade I - full view of glottis  Placement verified by: chest auscultation and capnometry   Measured from: gums  ETT to gums (cm): 21  Number of attempts at approach: 1  Dental    (+) edentulous    Pulmonary    (+) a smoker Former, decreased breath sounds,   (-) shortness of breath  Cardiovascular     Rhythm: regular  Rate: normal    (+) hyperlipidemia,   (-) past MI, angina, GAYLE, murmur, carotid bruits, cardiac stents      Neuro/Psych- negative ROS  GI/Hepatic/Renal/Endo    (+) obesity,       Musculoskeletal (-) negative ROS    Abdominal   (+) obese,    Substance History - negative use     OB/GYN negative ob/gyn ROS         Other      history of cancer (Colon treated.) remission    ROS/Med Hx Other: Large abdominal hernia secondary to colon surgery.                  Anesthesia Plan    ASA 3     general     intravenous induction     Anesthetic plan, risks, benefits, and alternatives have been provided, discussed and informed consent has been obtained with: patient.  Pre-procedure education provided  Plan discussed with CRNA.        CODE STATUS:

## 2023-01-04 NOTE — OP NOTE
PHALLOPLASTY, CIRCUMCISION  Procedure Note    Sunny Ernandez  1/4/2023Pre-op Diagnosis:   Balanitis [N48.1]        Post-op Diagnosis:     Post-Op Diagnosis Codes:     * Balanitis [N48.1]    Procedure(s):  CIRCUMCISION    Surgeon(s):  Frantz Calix MD    Anesthesia: General    Staff:   Circulator: Regine Mendieta RN  Scrub Person: Ruby Locke          Estimated Blood Loss: minimal    Specimens:                ID Type Source Tests Collected by Time   A (Not marked as sent) : foreskin Tissue Foreskin TISSUE EXAM, [facility] [place] 1/4/2023 1115         Drains:   Urethral Catheter Silicone 16 Fr. (Active)       Findings: Severe phimosis    Complications: None    Indications: Same    Description of Procedure: Patient brought the operating suite placed in supine position sterile prep and drape genitalia routine fashion.  He had severe phimosis we placed a hemostat inside foreskin.  I did a dorsal slit by crushing the tissue at the 12 o'clock position of the foreskin with a hemostat.  We made a circumferential incision with the foreskin pulled down around the corona of the penis and then retracted the foreskin with some difficulty by blunt sharp dissection with the Metzenbaums scissors and electrocautery, this was severely adhesed to the glans.  Once this was released we made a circumferential incision with the foreskin pulled proximal and then blunt sharp dissection we dissected out the foreskin away from the shaft of the penis.  A couple bleeders were cauterized with electrocautery and 2 additional bleeder we clamped with hemostat and tied with 2-0 Vicryl.  And then we reapproximated the skin edges at the 12:00 and 6 o'clock position with 2-0 Vicryl then we did a running stitch with 2-0 chromic coapting the skin edges between the quadrant stitches Vaseline gauze Peewee and Coban was applied to the shaft of the pain then we placed 3 cc of local 2% plain Xylocaine  circumferential at the base of the penis patient was then taken recovery having tolerated the procedure well    Frantz Calix MD     Date: 1/4/2023  Time: 11:37 CST

## 2023-01-04 NOTE — DISCHARGE INSTR - APPOINTMENTS
Call  [place] office to schedule a follow up appointment for Monday January 9th  #[phone]********

## 2023-01-05 ENCOUNTER — TRANSCRIBE ORDERS (OUTPATIENT)
Dept: CT IMAGING | Facility: HOSPITAL | Age: 83
End: 2023-01-05
Payer: MEDICARE

## 2023-01-05 DIAGNOSIS — C18.2 MALIGNANT NEOPLASM OF ASCENDING COLON: Primary | ICD-10-CM

## 2023-01-06 LAB — REF LAB TEST METHOD: NORMAL

## 2023-01-10 ENCOUNTER — LAB (OUTPATIENT)
Dept: LAB | Facility: HOSPITAL | Age: 83
End: 2023-01-10
Payer: MEDICARE

## 2023-01-10 ENCOUNTER — HOSPITAL ENCOUNTER (OUTPATIENT)
Dept: CT IMAGING | Facility: HOSPITAL | Age: 83
Discharge: HOME | End: 2023-01-10
Payer: MEDICARE

## 2023-01-10 DIAGNOSIS — D64.9 ANEMIA, UNSPECIFIED TYPE: ICD-10-CM

## 2023-01-10 DIAGNOSIS — D69.6 THROMBOCYTOPENIA: ICD-10-CM

## 2023-01-10 DIAGNOSIS — G62.0 NEUROPATHY DUE TO CHEMOTHERAPEUTIC DRUG: ICD-10-CM

## 2023-01-10 DIAGNOSIS — T45.1X5A NEUROPATHY DUE TO CHEMOTHERAPEUTIC DRUG: Chronic | ICD-10-CM

## 2023-01-10 DIAGNOSIS — G62.0 NEUROPATHY DUE TO CHEMOTHERAPEUTIC DRUG: Chronic | ICD-10-CM

## 2023-01-10 DIAGNOSIS — C18.2 MALIGNANT NEOPLASM OF ASCENDING COLON: ICD-10-CM

## 2023-01-10 DIAGNOSIS — D64.9 ANEMIA, UNSPECIFIED TYPE: Chronic | ICD-10-CM

## 2023-01-10 DIAGNOSIS — D72.819 LEUKOPENIA, UNSPECIFIED TYPE: ICD-10-CM

## 2023-01-10 DIAGNOSIS — T45.1X5A NEUROPATHY DUE TO CHEMOTHERAPEUTIC DRUG: ICD-10-CM

## 2023-01-10 DIAGNOSIS — E53.8 B12 DEFICIENCY: ICD-10-CM

## 2023-01-10 DIAGNOSIS — E53.8 B12 DEFICIENCY: Chronic | ICD-10-CM

## 2023-01-10 DIAGNOSIS — D69.6 THROMBOCYTOPENIA: Chronic | ICD-10-CM

## 2023-01-10 DIAGNOSIS — C18.2 MALIGNANT NEOPLASM OF ASCENDING COLON: Chronic | ICD-10-CM

## 2023-01-10 LAB
ALBUMIN SERPL-MCNC: 3.6 G/DL (ref 3.5–5.2)
ALBUMIN/GLOB SERPL: 1.3 G/DL
ALP SERPL-CCNC: 50 U/L (ref 39–117)
ALT SERPL W P-5'-P-CCNC: 13 U/L (ref 1–41)
ANION GAP SERPL CALCULATED.3IONS-SCNC: 8 MMOL/L (ref 5–15)
ANISOCYTOSIS BLD QL: NORMAL
AST SERPL-CCNC: 24 U/L (ref 1–40)
BASOPHILS # BLD AUTO: 0.02 10*3/MM3 (ref 0–0.2)
BASOPHILS NFR BLD AUTO: 0.6 % (ref 0–1.5)
BILIRUB SERPL-MCNC: 1.2 MG/DL (ref 0–1.2)
BUN SERPL-MCNC: 13 MG/DL (ref 8–23)
BUN/CREAT SERPL: 14.9 (ref 7–25)
CALCIUM SPEC-SCNC: 8.5 MG/DL (ref 8.6–10.5)
CEA SERPL-MCNC: 1.51 NG/ML
CHLORIDE SERPL-SCNC: 104 MMOL/L (ref 98–107)
CO2 SERPL-SCNC: 24 MMOL/L (ref 22–29)
CREAT SERPL-MCNC: 0.87 MG/DL (ref 0.76–1.27)
DEPRECATED RDW RBC AUTO: 46.2 FL (ref 37–54)
EGFRCR SERPLBLD CKD-EPI 2021: 86.1 ML/MIN/1.73
EOSINOPHIL # BLD AUTO: 0.16 10*3/MM3 (ref 0–0.4)
EOSINOPHIL NFR BLD AUTO: 4.9 % (ref 0.3–6.2)
ERYTHROCYTE [DISTWIDTH] IN BLOOD BY AUTOMATED COUNT: 14.4 % (ref 12.3–15.4)
FERRITIN SERPL-MCNC: 199.7 NG/ML (ref 30–400)
FOLATE SERPL-MCNC: 19 NG/ML (ref 4.78–24.2)
GLOBULIN UR ELPH-MCNC: 2.7 GM/DL
GLUCOSE SERPL-MCNC: 99 MG/DL (ref 65–99)
HCT VFR BLD AUTO: 33.1 % (ref 37.5–51)
HGB BLD-MCNC: 11.1 G/DL (ref 13–17.7)
IMM GRANULOCYTES # BLD AUTO: 0.01 10*3/MM3 (ref 0–0.05)
IMM GRANULOCYTES NFR BLD AUTO: 0.3 % (ref 0–0.5)
IRON 24H UR-MRATE: 41 MCG/DL (ref 59–158)
IRON SATN MFR SERPL: 17 % (ref 20–50)
LYMPHOCYTES # BLD AUTO: 0.56 10*3/MM3 (ref 0.7–3.1)
LYMPHOCYTES NFR BLD AUTO: 17.1 % (ref 19.6–45.3)
MCH RBC QN AUTO: 29.2 PG (ref 26.6–33)
MCHC RBC AUTO-ENTMCNC: 33.5 G/DL (ref 31.5–35.7)
MCV RBC AUTO: 87.1 FL (ref 79–97)
MONOCYTES # BLD AUTO: 0.34 10*3/MM3 (ref 0.1–0.9)
MONOCYTES NFR BLD AUTO: 10.4 % (ref 5–12)
NEUTROPHILS NFR BLD AUTO: 2.18 10*3/MM3 (ref 1.7–7)
NEUTROPHILS NFR BLD AUTO: 66.7 % (ref 42.7–76)
NRBC BLD AUTO-RTO: 0 /100 WBC (ref 0–0.2)
PLATELET # BLD AUTO: 80 10*3/MM3 (ref 140–450)
PMV BLD AUTO: 11.6 FL (ref 6–12)
POTASSIUM SERPL-SCNC: 3.9 MMOL/L (ref 3.5–5.2)
PROT SERPL-MCNC: 6.3 G/DL (ref 6–8.5)
RBC # BLD AUTO: 3.8 10*6/MM3 (ref 4.14–5.8)
SMALL PLATELETS BLD QL SMEAR: NORMAL
SODIUM SERPL-SCNC: 136 MMOL/L (ref 136–145)
TIBC SERPL-MCNC: 243 MCG/DL (ref 298–536)
TRANSFERRIN SERPL-MCNC: 163 MG/DL (ref 200–360)
VIT B12 BLD-MCNC: 999 PG/ML (ref 211–946)
WBC MORPH BLD: NORMAL
WBC NRBC COR # BLD: 3.27 10*3/MM3 (ref 3.4–10.8)

## 2023-01-10 PROCEDURE — 82746 ASSAY OF FOLIC ACID SERUM: CPT

## 2023-01-10 PROCEDURE — 82607 VITAMIN B-12: CPT

## 2023-01-10 PROCEDURE — 80053 COMPREHEN METABOLIC PANEL: CPT

## 2023-01-10 PROCEDURE — 82728 ASSAY OF FERRITIN: CPT

## 2023-01-10 PROCEDURE — 85025 COMPLETE CBC W/AUTO DIFF WBC: CPT

## 2023-01-10 PROCEDURE — 71260 CT THORAX DX C+: CPT

## 2023-01-10 PROCEDURE — 25010000002 IOPAMIDOL 61 % SOLUTION: Performed by: INTERNAL MEDICINE

## 2023-01-10 PROCEDURE — 74177 CT ABD & PELVIS W/CONTRAST: CPT

## 2023-01-10 PROCEDURE — 85007 BL SMEAR W/DIFF WBC COUNT: CPT

## 2023-01-10 PROCEDURE — 82378 CARCINOEMBRYONIC ANTIGEN: CPT

## 2023-01-10 PROCEDURE — 83540 ASSAY OF IRON: CPT

## 2023-01-10 PROCEDURE — 84466 ASSAY OF TRANSFERRIN: CPT

## 2023-01-10 RX ADMIN — IOPAMIDOL 90 ML: 612 INJECTION, SOLUTION INTRAVENOUS at 09:34

## 2023-01-12 NOTE — PROGRESS NOTES
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  Patient had a CT scan done recently along with blood work, is here to discuss result.  Complains of chronic neuropathy.  Complains of fatigue.  Denies any bleeding.  Denies any new lymph node enlargement.  Denies any new abdominal pain or change in bowel habits.  Complains of chronic arthralgia.              Past Medical History, Past Surgical History, Social History, Family History have been reviewed and are without significant changes except as mentioned.    Review of Systems   A comprehensive 14 point review of systems was performed and was negative except as mentioned in HPI.    Medications:  The current medication list was reviewed in the EMR    ALLERGIES:  No Known Allergies    Objective      Vitals:    01/17/23 1028   BP: 118/74   Pulse: 87   Temp: 97.7 °F (36.5 °C)   TempSrc: Temporal   SpO2: 94%   Weight: 106 kg (232 lb 9.6 oz)   PainSc: 0-No pain     Current Status 7/9/2021   ECOG score 1       Physical Exam  Pulmonary:      Breath sounds: Normal breath sounds.   Neurological:      Mental Status: He is alert and oriented to person, place, and time.           RECENT LABS:  Glucose   Date Value Ref Range Status   01/10/2023 99 65 - 99 mg/dL Final     Sodium   Date Value Ref Range Status   01/10/2023 136 136 - 145 mmol/L Final   04/05/2018 139 136 - 145 mmol/L Final     Potassium   Date Value Ref Range Status   01/10/2023 3.9 3.5 - 5.2 mmol/L Final   04/05/2018 4.1 3.3 - 5.0 mmol/L Final      CO2   Date Value Ref Range Status   01/10/2023 24.0 22.0 - 29.0 mmol/L Final     Total CO2   Date Value Ref Range Status   04/05/2018 25 20 - 31 mmol/L Final     Chloride   Date Value Ref Range Status   01/10/2023 104 98 - 107 mmol/L Final   04/05/2018 106 99 - 111 mmol/L Final     Anion Gap   Date Value Ref Range Status   01/10/2023 8.0 5.0 - 15.0 mmol/L Final     Creatinine   Date Value Ref Range Status   01/10/2023 0.87 0.76 - 1.27 mg/dL Final   04/05/2018 1.0 0.6 - 1.3 mg/dL Final     BUN   Date Value Ref Range Status   01/10/2023 13 8 - 23 mg/dL Final   04/05/2018 11 7.0 - 18.0 mg/dL Final     BUN/Creatinine Ratio   Date Value Ref Range Status   01/10/2023 14.9 7.0 - 25.0 Final     Calcium   Date Value Ref Range Status   01/10/2023 8.5 (L) 8.6 - 10.5 mg/dL Final   04/05/2018 8.5 8.1 - 10.2 mg/dL Final     eGFR Non  Amer   Date Value Ref Range Status   01/12/2022 78 >60 mL/min/1.73 Final     Alkaline Phosphatase   Date Value Ref Range Status   01/10/2023 50 39 - 117 U/L Final   04/05/2018 55 30 - 120 IU/L Final     Total Protein   Date Value Ref Range Status   01/10/2023 6.3 6.0 - 8.5 g/dL Final     ALT (SGPT)   Date Value Ref Range Status   01/10/2023 13 1 - 41 U/L Final   04/05/2018 10 (L) 17 - 63 IU/L Final     AST (SGOT)   Date Value Ref Range Status   01/10/2023 24 1 - 40 U/L Final   04/05/2018 17 15 - 41 IU/L Final     Total Bilirubin   Date Value Ref Range Status   01/10/2023 1.2 0.0 - 1.2 mg/dL Final   04/05/2018 0.42 0 - 1.50 mg/dL Final     Albumin   Date Value Ref Range Status   01/10/2023 3.6 3.5 - 5.2 g/dL Final   04/05/2018 3.8 3.4 - 5.0 g/dl Final     Globulin   Date Value Ref Range Status   01/10/2023 2.7 gm/dL Final     Lab Results   Component Value Date    WBC 3.27 (L) 01/10/2023    HGB 11.1 (L) 01/10/2023    HCT 33.1 (L) 01/10/2023    MCV 87.1 01/10/2023    PLT 80 (L) 01/10/2023     Lab Results   Component Value Date    NEUTROABS 2.18 01/10/2023    IRON 41 (L) 01/10/2023    IRON 60  "2022    IRON 68 2022    TIBC 243 (L) 01/10/2023    TIBC 270 (L) 2022    TIBC 246 (L) 2022    LABIRON 17 (L) 01/10/2023    LABIRON 22 2022    LABIRON 28 2022    FERRITIN 199.70 01/10/2023    FERRITIN 210.50 2022    FERRITIN 187.80 2022    OYGYHJOW85 999 (H) 01/10/2023    CHCCZAWU24 >2,000 (H) 2022    ENVPIRYC71 671 2022    FOLATE 19.00 01/10/2023    FOLATE >20.00 2022    FOLATE 16.70 2022     Lab Results   Component Value Date    CEA 1.51 01/10/2023    REFLABREPO  2023     [facility]  [address] [place] [address]  Phone: [phone] or [phone]  Fax: [phone]  Aubrey Michaud M.D., Medical Director    PATIENT NAME                           LABORATORY NO.  [id number]  OSEAS MEIER.             [id number]  [id number]                         AGE              SEX  N           CLIENT REF #  [facility]           82      1940      [id number]   [id number]    [place]                       REQUESTING M.D.     ATTENDING M.D.     COPY TO.  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 See diagnosis for details.    Professional interpretation rendered by Dagoberto Ford M.D. at [facility],  [facility], [address] [place] [address].    GROSS DESCRIPTION:  Specimen received in formalin labeled \"foreskin\" and consists of a 6.5 x 3.3 " x  0.6 (depth) centimeters unoriented irregular portion of gray-brown wrinkled  foreskin.  No lesions or ulcerated areas are grossly identified.  Surgical margin is  inked blue.  Sectioning reveals a grossly unremarkable cut surface.  No areas of  nodularity or other discrete mass lesions are grossly identified.  Representative  sections are submitted in a single cassette.  JKEYSHAWN/RLL    REVIEWED, DIAGNOSED AND ELECTRONICALLY  SIGNED BY:    Dagoberto Ford M.D.  CPT CODES:  18674           PATHOLOGY:  * Cannot find OR log *         RADIOLOGY DATA :  CT Chest With Contrast Diagnostic    Result Date: 1/12/2023  Stable probably benign noncalcified nodule right upper lobe. Small pericardial effusion may be present. No evidence of metastatic disease in the chest. 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 Suspect anasarca. Large midline ventral hernia containing multiple bowel loops without incarceration or strangulation. No evidence of metastatic disease or definite recurrent intra-abdominal or pelvic malignancy. Other findings as above. See body of report for full details. Electronically signed by:  Angel Hunter MD  1/12/2023 8:33 AM CST Workstation: [id number]    CT Abdomen Pelvis With Contrast    Result Date: 1/12/2023  Stable probably benign noncalcified nodule right upper lobe. Small pericardial effusion may be present. No evidence of metastatic disease in the chest. 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 Suspect anasarca. Large midline ventral hernia containing multiple bowel loops without incarceration or strangulation. No evidence of metastatic disease or definite recurrent intra-abdominal or pelvic malignancy. Other findings as above. See body of report for full details. Electronically signed by:  Angel Hunter MD  1/12/2023 8:33 AM CST Workstation: [id number]          Assessment & Plan     1.  Adenocarcinoma of cecum stage II, [id number] 22 lymph node negative diagnosed in April 2018  - Has been  on surveillance since surgery  - Colonoscopy on August 30, 2022 was negative for malignancy  - CEA level is 1.51  - CT of chest, abdomen and pelvis with contrast done on January 10, 2023 does not show any evidence of recurrence or metastasis which was discussed with patient.  - We will continue with clinical surveillance  - We will have patient return to clinic in 4 months with repeat CBC, CMP, CEA, iron studies, ferritin, B12 and folate to be done on that day.    2.  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  Anemia:  - Hemoglobin is 11.1.  Iron studies are showing developing iron deficiency.  Recommend starting ferrous sulfate 3 times a week with stool softener  - Recommend continue with B12 injection monthly with folic acid 3 times a week    4.  Thrombocytopenia  - Secondary to splenomegaly plus or minus ITP plus or minus developing cirrhosis  - Recent CT scan showing some nodular liver contour compatible with early cirrhosis.  - Platelet count is 80,000.  Patient denies any bleeding  - We will monitor with CBC    5.  Vitamin B12 deficiency  - Remains on B12 injection monthly    6.  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  Leukopenia:  - Secondary to possible cirrhosis with splenomegaly  - White blood cell count is 3.27 with ANC of 2.18    8.  Health maintenance: Patient does not smoke.  Had a colonoscopy in 2022 by Dr. Goins    9. Advance Care Planning: For now patient remains full code and is able to make decisions.  Patient has health care surrogate mentioned on chart.             PHQ-9 Total Score: 2   -Patient is not homicidal or suicidal.  No acute intervention required.    Sunny Ernandez reports a pain score of 0.  Given his pain assessment as noted, treatment options were discussed and the following options were decided upon as a follow-up plan to address the patient's pain:  continuation of current treatment plan for pain.         Dany Baig MD  1/17/2023  10:49 CST        Part of this note may be an electronic transcription/translation of spoken language to printed text using the Dragon Dictation System.          CC:

## 2023-01-16 RX ORDER — FOLIC ACID 1 MG/1
TABLET ORAL
Qty: 90 TABLET | Refills: 0 | Status: SHIPPED | OUTPATIENT
Start: 2023-01-16

## 2023-01-16 NOTE — TELEPHONE ENCOUNTER
Rx Refill Note  Requested Prescriptions     Pending Prescriptions Disp Refills   • folic acid (FOLVITE) 1 MG tablet [Pharmacy Med Name: Folic Acid 1 MG Oral Tablet] 90 tablet 0     Sig: Take 1 tablet by mouth once daily      Last office visit with prescribing clinician: 9/16/2022   Last telemedicine visit with prescribing clinician: 1/17/2023   Next office visit with prescribing clinician: 1/17/2023                         Would you like a call back once the refill request has been completed: [] Yes [] No    If the office needs to give you a call back, can they leave a voicemail: [] Yes [] No    Jessica Costa, [place] Rep  01/16/23, 07:52 CST

## 2023-01-17 ENCOUNTER — OFFICE VISIT (OUTPATIENT)
Dept: ONCOLOGY | Facility: CLINIC | Age: 83
End: 2023-01-17
Payer: MEDICARE

## 2023-01-17 VITALS
DIASTOLIC BLOOD PRESSURE: 74 MMHG | WEIGHT: 232.6 LBS | HEART RATE: 87 BPM | TEMPERATURE: 97.7 F | SYSTOLIC BLOOD PRESSURE: 118 MMHG | BODY MASS INDEX: 32.44 KG/M2 | OXYGEN SATURATION: 94 %

## 2023-01-17 DIAGNOSIS — E53.8 B12 DEFICIENCY: Chronic | ICD-10-CM

## 2023-01-17 DIAGNOSIS — C18.2 MALIGNANT NEOPLASM OF ASCENDING COLON: Primary | Chronic | ICD-10-CM

## 2023-01-17 DIAGNOSIS — D64.9 ANEMIA, UNSPECIFIED TYPE: Chronic | ICD-10-CM

## 2023-01-17 DIAGNOSIS — T45.1X5A NEUROPATHY DUE TO CHEMOTHERAPEUTIC DRUG: Chronic | ICD-10-CM

## 2023-01-17 DIAGNOSIS — D69.6 THROMBOCYTOPENIA: Chronic | ICD-10-CM

## 2023-01-17 DIAGNOSIS — G62.0 NEUROPATHY DUE TO CHEMOTHERAPEUTIC DRUG: Chronic | ICD-10-CM

## 2023-01-17 DIAGNOSIS — Z85.038 PERSONAL HISTORY OF COLON CANCER: ICD-10-CM

## 2023-01-17 PROCEDURE — G0463 HOSPITAL OUTPT CLINIC VISIT: HCPCS | Performed by: INTERNAL MEDICINE

## 2023-01-17 PROCEDURE — 1123F ACP DISCUSS/DSCN MKR DOCD: CPT | Performed by: INTERNAL MEDICINE

## 2023-01-17 PROCEDURE — 99214 OFFICE O/P EST MOD 30 MIN: CPT | Performed by: INTERNAL MEDICINE

## 2023-01-17 PROCEDURE — 1126F AMNT PAIN NOTED NONE PRSNT: CPT | Performed by: INTERNAL MEDICINE

## 2023-01-17 RX ORDER — SIMVASTATIN 40 MG
1 TABLET ORAL EVERY EVENING
COMMUNITY
Start: 2023-01-03

## 2023-01-17 RX ORDER — CYANOCOBALAMIN 1000 UG/ML
INJECTION, SOLUTION INTRAMUSCULAR; SUBCUTANEOUS
COMMUNITY
Start: 2022-12-02 | End: 2023-01-17

## 2023-01-19 ENCOUNTER — TELEPHONE (OUTPATIENT)
Dept: ONCOLOGY | Facility: CLINIC | Age: 83
End: 2023-01-19

## 2023-01-19 RX ORDER — FERROUS SULFATE 325(65) MG
TABLET ORAL
Qty: 36 TABLET | Refills: 1 | Status: SHIPPED | OUTPATIENT
Start: 2023-01-19

## 2023-01-19 RX ORDER — DOCUSATE SODIUM 100 MG/1
100 CAPSULE, LIQUID FILLED ORAL 2 TIMES DAILY PRN
Qty: 60 CAPSULE | Refills: 2 | Status: SHIPPED | OUTPATIENT
Start: 2023-01-19

## 2023-01-19 NOTE — TELEPHONE ENCOUNTER
Incoming Refill Request      Medication requested (name and dose): iron    Pharmacy where request should be sent: [facility] [place], [place]    Additional details provided by patient: I did not see a prescription for iron    Best call back number: [phone]    Does the patient have less than a 3 day supply:  [x] Yes  [] No    Ste[facility] Means Rep  01/19/23, 11:04 CST

## 2023-04-18 ENCOUNTER — OFFICE VISIT (OUTPATIENT)
Dept: ONCOLOGY | Facility: CLINIC | Age: 83
End: 2023-04-18
Payer: MEDICARE

## 2023-04-18 ENCOUNTER — LAB (OUTPATIENT)
Dept: ONCOLOGY | Facility: HOSPITAL | Age: 83
End: 2023-04-18
Payer: MEDICARE

## 2023-04-18 VITALS
HEART RATE: 78 BPM | WEIGHT: 233 LBS | SYSTOLIC BLOOD PRESSURE: 135 MMHG | OXYGEN SATURATION: 96 % | DIASTOLIC BLOOD PRESSURE: 76 MMHG | RESPIRATION RATE: 18 BRPM | BODY MASS INDEX: 32.5 KG/M2

## 2023-04-18 DIAGNOSIS — C18.2 MALIGNANT NEOPLASM OF ASCENDING COLON: Primary | Chronic | ICD-10-CM

## 2023-04-18 DIAGNOSIS — T45.1X5A NEUROPATHY DUE TO CHEMOTHERAPEUTIC DRUG: Chronic | ICD-10-CM

## 2023-04-18 DIAGNOSIS — D69.6 THROMBOCYTOPENIA: ICD-10-CM

## 2023-04-18 DIAGNOSIS — G62.0 NEUROPATHY DUE TO CHEMOTHERAPEUTIC DRUG: Chronic | ICD-10-CM

## 2023-04-18 DIAGNOSIS — E53.8 B12 DEFICIENCY: Chronic | ICD-10-CM

## 2023-04-18 DIAGNOSIS — Z85.038 PERSONAL HISTORY OF COLON CANCER: ICD-10-CM

## 2023-04-18 DIAGNOSIS — T45.1X5A NEUROPATHY DUE TO CHEMOTHERAPEUTIC DRUG: ICD-10-CM

## 2023-04-18 DIAGNOSIS — D64.9 ANEMIA, UNSPECIFIED TYPE: ICD-10-CM

## 2023-04-18 DIAGNOSIS — D64.9 ANEMIA, UNSPECIFIED TYPE: Chronic | ICD-10-CM

## 2023-04-18 DIAGNOSIS — C18.2 MALIGNANT NEOPLASM OF ASCENDING COLON: ICD-10-CM

## 2023-04-18 DIAGNOSIS — E53.8 B12 DEFICIENCY: ICD-10-CM

## 2023-04-18 DIAGNOSIS — D69.6 THROMBOCYTOPENIA: Chronic | ICD-10-CM

## 2023-04-18 DIAGNOSIS — G62.0 NEUROPATHY DUE TO CHEMOTHERAPEUTIC DRUG: ICD-10-CM

## 2023-04-18 LAB
ALBUMIN SERPL-MCNC: 3.1 G/DL (ref 3.5–5.2)
ALBUMIN/GLOB SERPL: 1 G/DL
ALP SERPL-CCNC: 58 U/L (ref 39–117)
ALT SERPL W P-5'-P-CCNC: 9 U/L (ref 1–41)
ANION GAP SERPL CALCULATED.3IONS-SCNC: 9 MMOL/L (ref 5–15)
AST SERPL-CCNC: 21 U/L (ref 1–40)
BASOPHILS # BLD AUTO: 0.03 10*3/MM3 (ref 0–0.2)
BASOPHILS NFR BLD AUTO: 0.8 % (ref 0–1.5)
BILIRUB SERPL-MCNC: 1.4 MG/DL (ref 0–1.2)
BUN SERPL-MCNC: 18 MG/DL (ref 8–23)
BUN/CREAT SERPL: 20.9 (ref 7–25)
CALCIUM SPEC-SCNC: 8.2 MG/DL (ref 8.6–10.5)
CHLORIDE SERPL-SCNC: 106 MMOL/L (ref 98–107)
CO2 SERPL-SCNC: 22 MMOL/L (ref 22–29)
CREAT SERPL-MCNC: 0.86 MG/DL (ref 0.76–1.27)
DEPRECATED RDW RBC AUTO: 45 FL (ref 37–54)
EGFRCR SERPLBLD CKD-EPI 2021: 85.9 ML/MIN/1.73
EOSINOPHIL # BLD AUTO: 0.15 10*3/MM3 (ref 0–0.4)
EOSINOPHIL NFR BLD AUTO: 3.9 % (ref 0.3–6.2)
ERYTHROCYTE [DISTWIDTH] IN BLOOD BY AUTOMATED COUNT: 14.3 % (ref 12.3–15.4)
FERRITIN SERPL-MCNC: 248.9 NG/ML (ref 30–400)
FOLATE SERPL-MCNC: 18.9 NG/ML (ref 4.78–24.2)
GLOBULIN UR ELPH-MCNC: 3.1 GM/DL
GLUCOSE SERPL-MCNC: 96 MG/DL (ref 65–99)
HCT VFR BLD AUTO: 32.6 % (ref 37.5–51)
HGB BLD-MCNC: 11 G/DL (ref 13–17.7)
IMM GRANULOCYTES # BLD AUTO: 0.01 10*3/MM3 (ref 0–0.05)
IMM GRANULOCYTES NFR BLD AUTO: 0.3 % (ref 0–0.5)
IRON 24H UR-MRATE: 35 MCG/DL (ref 59–158)
IRON SATN MFR SERPL: 18 % (ref 20–50)
LYMPHOCYTES # BLD AUTO: 0.66 10*3/MM3 (ref 0.7–3.1)
LYMPHOCYTES NFR BLD AUTO: 17.2 % (ref 19.6–45.3)
MCH RBC QN AUTO: 28.9 PG (ref 26.6–33)
MCHC RBC AUTO-ENTMCNC: 33.7 G/DL (ref 31.5–35.7)
MCV RBC AUTO: 85.8 FL (ref 79–97)
MONOCYTES # BLD AUTO: 0.35 10*3/MM3 (ref 0.1–0.9)
MONOCYTES NFR BLD AUTO: 9.1 % (ref 5–12)
NEUTROPHILS NFR BLD AUTO: 2.63 10*3/MM3 (ref 1.7–7)
NEUTROPHILS NFR BLD AUTO: 68.7 % (ref 42.7–76)
NRBC BLD AUTO-RTO: 0 /100 WBC (ref 0–0.2)
PLATELET # BLD AUTO: 132 10*3/MM3 (ref 140–450)
PMV BLD AUTO: 11.5 FL (ref 6–12)
POTASSIUM SERPL-SCNC: 4 MMOL/L (ref 3.5–5.2)
PROT SERPL-MCNC: 6.2 G/DL (ref 6–8.5)
RBC # BLD AUTO: 3.8 10*6/MM3 (ref 4.14–5.8)
SODIUM SERPL-SCNC: 137 MMOL/L (ref 136–145)
TIBC SERPL-MCNC: 195 MCG/DL (ref 298–536)
TRANSFERRIN SERPL-MCNC: 131 MG/DL (ref 200–360)
VIT B12 BLD-MCNC: >2000 PG/ML (ref 211–946)
WBC NRBC COR # BLD: 3.83 10*3/MM3 (ref 3.4–10.8)

## 2023-04-18 NOTE — PROGRESS NOTES
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  Denies any bleeding.  Complains of fatigue.  Complains of chronic neuropathy.  Complains of abdominal discomfort due to hernia for which patient is scheduled to be evaluated by surgery team later this month.  Denies any new enlargement.  Denies any change in bowel habits or new onset of abdominal pain.  Complains of chronic arthralgia.              Past Medical History, Past Surgical History, Social History, Family History have been reviewed and are without significant changes except as mentioned.    Review of Systems   A comprehensive 14 point review of systems was performed and was negative except as mentioned in HPI.    Medications:  The current medication list was reviewed in the EMR    ALLERGIES:  No Known Allergies    Objective      Vitals:    04/18/23 0955   BP: 135/76   Pulse: 78   Resp: 18   SpO2: 96%   Weight: 106 kg (233 lb)   PainSc: 0-No pain         7/9/2021     9:59 AM   Current Status   ECOG score 1       Physical Exam  Pulmonary:      Breath sounds: Normal breath sounds.   Abdominal:      Hernia: A hernia is present.   Neurological:      Mental Status: He is alert and oriented to person, place, and time.           RECENT LABS:  Glucose   Date Value Ref Range Status   04/18/2023 96 65 - 99 mg/dL Final   04/04/2023 94.0 70.0 - 105.0 mg/dL Final     Sodium   Date Value Ref Range Status   04/18/2023 137 136 - 145 mmol/L Final   04/04/2023 137.0 135.0 - 145.0 mEq/L Final   04/05/2018  139 136 - 145 mmol/L Final     Potassium   Date Value Ref Range Status   04/18/2023 4.0 3.5 - 5.2 mmol/L Final   04/04/2023 4.3 3.5 - 5.1 mEq/L Final   04/05/2018 4.1 3.3 - 5.0 mmol/L Final     CO2   Date Value Ref Range Status   04/18/2023 22.0 22.0 - 29.0 mmol/L Final     Total CO2   Date Value Ref Range Status   04/05/2018 25 20 - 31 mmol/L Final     Chloride   Date Value Ref Range Status   04/18/2023 106 98 - 107 mmol/L Final   04/04/2023 107.0 98.0 - 107.0 mEq/L Final   04/05/2018 106 99 - 111 mmol/L Final     Anion Gap   Date Value Ref Range Status   04/18/2023 9.0 5.0 - 15.0 mmol/L Final   04/04/2023 6.0 4.0 - 12.0 mmol/L Final     Creatinine   Date Value Ref Range Status   04/18/2023 0.86 0.76 - 1.27 mg/dL Final   04/04/2023 0.92 0.60 - 1.20 mg/dL Final   04/05/2018 1.0 0.6 - 1.3 mg/dL Final     BUN   Date Value Ref Range Status   04/18/2023 18 8 - 23 mg/dL Final   04/04/2023 20.0 7.0 - 25.0 mg/dL Final   04/05/2018 11 7.0 - 18.0 mg/dL Final     BUN/Creatinine Ratio   Date Value Ref Range Status   04/18/2023 20.9 7.0 - 25.0 Final     Calcium   Date Value Ref Range Status   04/18/2023 8.2 (L) 8.6 - 10.5 mg/dL Final   04/04/2023 8.8 8.6 - 10.3 mg/dL Final   04/05/2018 8.5 8.1 - 10.2 mg/dL Final     eGFR Non  Amer   Date Value Ref Range Status   01/12/2022 78 >60 mL/min/1.73 Final     Alkaline Phosphatase   Date Value Ref Range Status   04/18/2023 58 39 - 117 U/L Final   04/04/2023 49.0 40.0 - 104.0 U/L Final   04/05/2018 55 30 - 120 IU/L Final     Total Protein   Date Value Ref Range Status   04/18/2023 6.2 6.0 - 8.5 g/dL Final   04/04/2023 6.3 6.0 - 8.3 g/dL Final     ALT (SGPT)   Date Value Ref Range Status   04/18/2023 9 1 - 41 U/L Final   04/04/2023 9.0 5.0 - 52.0 U/L Final   04/05/2018 10 (L) 17 - 63 IU/L Final     AST (SGOT)   Date Value Ref Range Status   04/18/2023 21 1 - 40 U/L Final   04/04/2023 18.0 11.0 - 39.0 U/L Final   04/05/2018 17 15 - 41 IU/L Final     Total Bilirubin   Date Value Ref  Range Status   2023 1.4 (H) 0.0 - 1.2 mg/dL Final   2023 1.67 (H) 0.20 - 1.00 mg/dL Final   2018 0.42 0 - 1.50 mg/dL Final     Albumin   Date Value Ref Range Status   2023 3.1 (L) 3.5 - 5.2 g/dL Final   2023 3.3 (L) 3.5 - 5.7 g/dL Final   2018 3.8 3.4 - 5.0 g/dl Final     Globulin   Date Value Ref Range Status   2023 3.1 gm/dL Final     Lab Results   Component Value Date    WBC 3.83 2023    HGB 11.0 (L) 2023    HCT 32.6 (L) 2023    MCV 85.8 2023     (L) 2023     Lab Results   Component Value Date    NEUTROABS 2.63 2023    IRON 35 (L) 2023    IRON 41 (L) 01/10/2023    IRON 60 2022    TIBC 195 (L) 2023    TIBC 243 (L) 01/10/2023    TIBC 270 (L) 2022    LABIRON 18 (L) 2023    LABIRON 17 (L) 01/10/2023    LABIRON 22 2022    FERRITIN 248.90 2023    FERRITIN 199.70 01/10/2023    FERRITIN 210.50 2022    ATFJGRET73 836.6 2023    ZDPKYQEG75 999 (H) 01/10/2023    DKHYETKL85 >2,000 (H) 2022    FOLATE 19.00 01/10/2023    FOLATE >20.00 2022    FOLATE 16.70 2022     Lab Results   Component Value Date    CEA 1.51 01/10/2023    REFLABREPO  2023     [facility]  [address] [place] [address]  Phone: [phone] or [phone]  Fax: [phone]  Aubrey Michaud M.D., Medical Director    PATIENT NAME                           LABORATORY NO.  [id number]  OSEAS MEIER.             [id number]  [id number]                         AGE              SEX  SSN           CLIENT REF #  [facility]           82      1940      [id number]   [id number]    [place]                       REQUESTING M.D.     ATTENDING M.D.     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 See diagnosis for details.    Professional interpretation rendered by Dagoberto Ford M.D. at [facility], [address] [place] [address].    GROSS DESCRIPTION:  Specimen received in formalin labeled \"foreskin\" and consists of a 6.5 x 3.3 x  0.6 (depth) centimeters unoriented irregular portion of gray-brown wrinkled  foreskin.  No lesions or ulcerated areas are grossly identified.  Surgical margin is  inked blue.  Sectioning reveals a grossly unremarkable cut surface.  No areas of  nodularity or other discrete mass lesions are grossly identified.  Representative  sections are submitted in a single cassette.  JTM/RLL    REVIEWED, DIAGNOSED AND ELECTRONICALLY  SIGNED BY:    Dagoberto Ford M.D.  CPT CODES:  25685           PATHOLOGY:  * Cannot find OR log *         RADIOLOGY DATA :  No radiology results for the last 7 days        Assessment & Plan     1.  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  Adenocarcinoma of transverse colon stage III, T3 N1 M0 diagnosed in July 2020  - Had a surgery followed by [facility] that completed in March 2013    3. "  Anemia:  - Hemoglobin is 11.0  -Patient is currently on ferrous sulfate and folic acid 3 times a week on monthly B12 injection    4.  Thrombocytopenia:  - Secondary to splenomegaly plus or minus ITP plus or minus developing cirrhosis  - Platelet count is 132,000  - We will monitor with CBC    5.  Vitamin B12 deficiency:  - Remains on monthly B12 injection    6.  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  Health maintenance: Patient does not smoke.  Had a colonoscopy in 2022 by Dr. Goins    8.  Advance care planning:  - CODE STATUS and resuscitation were discussed with patient today.  Patient remains full code.                 PHQ-9 Total Score: 0   -Patient is not homicidal or suicidal.  No acute intervention required.    Sunny Ernandez reports a pain score of 0.  Given his pain assessment as noted, treatment options were discussed and the following options were decided upon as a follow-up plan to address the patient's pain: continuation of current treatment plan for pain.         Dany Baig MD  4/18/2023  16:55 CDT        Part of this note may be an electronic transcription/translation of spoken language to printed text using the Dragon Dictation System.          CC:

## 2023-04-19 ENCOUNTER — TELEPHONE (OUTPATIENT)
Dept: ONCOLOGY | Facility: HOSPITAL | Age: 83
End: 2023-04-19
Payer: MEDICARE

## 2023-04-19 NOTE — TELEPHONE ENCOUNTER
----- Message from Dany Baig MD sent at 4/19/2023  6:59 AM CDT -----  Please let patient know, he needs to continue taking ferrous sulfate and folic acid 3 times a week.  Recommend continue with monthly B12 injection.  Thank you

## 2023-04-20 ENCOUNTER — TELEPHONE (OUTPATIENT)
Dept: ONCOLOGY | Facility: HOSPITAL | Age: 83
End: 2023-04-20
Payer: MEDICARE

## 2023-04-21 ENCOUNTER — OFFICE VISIT (OUTPATIENT)
Dept: SURGERY | Facility: CLINIC | Age: 83
End: 2023-04-21
Payer: MEDICARE

## 2023-04-21 VITALS
BODY MASS INDEX: 33.32 KG/M2 | HEIGHT: 71 IN | SYSTOLIC BLOOD PRESSURE: 118 MMHG | WEIGHT: 238 LBS | OXYGEN SATURATION: 95 % | DIASTOLIC BLOOD PRESSURE: 70 MMHG | TEMPERATURE: 98.2 F | HEART RATE: 79 BPM

## 2023-04-21 DIAGNOSIS — C18.2 MALIGNANT NEOPLASM OF ASCENDING COLON: Primary | ICD-10-CM

## 2023-04-21 DIAGNOSIS — K43.2 INCISIONAL HERNIA, WITHOUT OBSTRUCTION OR GANGRENE: ICD-10-CM

## 2023-04-21 PROCEDURE — 1160F RVW MEDS BY RX/DR IN RCRD: CPT | Performed by: NURSE PRACTITIONER

## 2023-04-21 PROCEDURE — 1159F MED LIST DOCD IN RCRD: CPT | Performed by: NURSE PRACTITIONER

## 2023-04-21 PROCEDURE — 99213 OFFICE O/P EST LOW 20 MIN: CPT | Performed by: NURSE PRACTITIONER

## 2023-04-21 NOTE — PROGRESS NOTES
"Chief Complaint  Follow-up (1 year follow-up. Open Right Colon resection with primary repair of incisional hernias 4-18-18.)    Subjective        Sunny Ernandez presents to [facility] [place]     History of Present Illness  Mr. Sunny Ernandez is an 83 year old patient who presents today for recheck. He underwent prior colon resection in 2018 for colon cancer and has an known ventral/incisional hernia.  He still has routine surveillance visits per medical oncology.  Most recent CEA was stable. Had colonoscopy with Dr. Goins in August 2022 and pathology of polyp revealed a tubular adenoma.  Hernia was previously asymptomatic however he admits over the last several months he has had intermittent abdominal pain at site of hernia especially with movement of physical activity. States he has been eating and drinking well and denies any nausea or vomiting. States bowels are working well and denies any constipation or diarrhea.       Most recent CT impression as below:      IMPRESSION:   Stable probably benign noncalcified nodule right upper lobe.   Small pericardial effusion may be present.   No evidence of metastatic disease in the chest.   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 Suspect   anasarca.   Large midline ventral hernia containing multiple bowel loops   without incarceration or strangulation.   No evidence of metastatic disease or definite recurrent   intra-abdominal or pelvic malignancy.   Other findings as above. See body of report for full details.   Electronically signed by: Angel Hunter MD 1/12/2023 8:33 AM   CST Workstation: [id number]    Objective   Vital Signs:  /70 (BP Location: Left arm, Patient Position: Sitting, Cuff Size: Adult)   Pulse 79   Temp 98.2 °F (36.8 °C) (Temporal)   Ht 180.3 cm (71\")   Wt 108 kg (238 lb)   SpO2 95%   BMI 33.19 kg/m²   Estimated body mass index is 33.19 kg/m² as calculated from the " "following:    Height as of this encounter: 180.3 cm (71\").    Weight as of this encounter: 108 kg (238 lb).             Physical Exam  Vitals reviewed.   Constitutional:       General: He is not in acute distress.     Appearance: Normal appearance. He is not ill-appearing, toxic-appearing or diaphoretic.   HENT:      Head: Normocephalic and atraumatic.   Pulmonary:      Effort: Pulmonary effort is normal. No respiratory distress.   Abdominal:      General: Bowel sounds are normal.      Palpations: Abdomen is soft.      Hernia: A hernia (large ventral incisional hernia) is present. Hernia is present in the ventral area.      Comments: Large ventral hernia   Neurological:      General: No focal deficit present.      Mental Status: He is alert and oriented to person, place, and time.   Psychiatric:         Mood and Affect: Mood normal.         Behavior: Behavior normal.         Thought Content: Thought content normal.         Judgment: Judgment normal.        Result Review :                   Assessment and Plan   Diagnoses and all orders for this visit:    1. Malignant neoplasm of ascending colon (Primary)    2. Incisional hernia, without obstruction or gangrene      Recheck with surgeon to discuss possible hernia repair. Patient is educated on signs/symptoms of incarceration or strangulation that would be considered a medical emergency.         Follow Up   Return in about 1 week (around 4/28/2023), or if symptoms worsen or fail to improve.  Patient was given instructions and counseling regarding his condition or for health maintenance advice. Please see specific information pulled into the AVS if appropriate.                   This document has been electronically signed by MARIA TERESA Galvan on April 21, 2023 12:34 CDT       "

## 2023-04-24 ENCOUNTER — OFFICE VISIT (OUTPATIENT)
Dept: SURGERY | Facility: CLINIC | Age: 83
End: 2023-04-24
Payer: MEDICARE

## 2023-04-24 VITALS
OXYGEN SATURATION: 96 % | HEART RATE: 80 BPM | DIASTOLIC BLOOD PRESSURE: 85 MMHG | HEIGHT: 71 IN | TEMPERATURE: 97.8 F | SYSTOLIC BLOOD PRESSURE: 129 MMHG | BODY MASS INDEX: 33.04 KG/M2 | WEIGHT: 236 LBS

## 2023-04-24 DIAGNOSIS — R18.8 CIRRHOSIS OF LIVER WITH ASCITES, UNSPECIFIED HEPATIC CIRRHOSIS TYPE: ICD-10-CM

## 2023-04-24 DIAGNOSIS — K74.60 CIRRHOSIS OF LIVER WITH ASCITES, UNSPECIFIED HEPATIC CIRRHOSIS TYPE: ICD-10-CM

## 2023-04-24 DIAGNOSIS — K43.2 INCISIONAL HERNIA, WITHOUT OBSTRUCTION OR GANGRENE: Primary | ICD-10-CM

## 2023-04-24 NOTE — PROGRESS NOTES
Chief Complaint   Patient presents with   • Follow-up     Incisional hernia        83-year-old male with a longtime known incisional hernia after colon resection.  He has begun to have some pain due to his hernia.  He states that he has aches in his abdomen almost every day.  These are not severe, but they do sometimes interrupt his normal activity.  He has had to get larger pants due to the increased size of the hernia.  He is eating normally and continues to have normal bowel function.      Past Medical History:   Diagnosis Date   • Colon cancer    • Hernia    • High cholesterol        Past Surgical History:   Procedure Laterality Date   • COLON RESECTION  2012   • COLON RESECTION N/A 04/18/2018    Procedure: Open Right Colon Resection with repair of incisional hernias;  Surgeon: Frantz Shelley MD;  Location: [facility];  Service: General   • COLONOSCOPY     • COLONOSCOPY N/A 04/04/2018    Procedure: COLONOSCOPY;  Surgeon: Austin Goins DO;  Location: [facility] ENDOSCOPY;  Service: Gastroenterology   • COLONOSCOPY N/A 04/15/2019    Procedure: COLONOSCOPY;  Surgeon: Austin Goins DO;  Location: [facility] ENDOSCOPY;  Service: Gastroenterology   • COLONOSCOPY N/A 8/30/2022    Procedure: COLONOSCOPY 10:00;  Surgeon: Austin Goins DO;  Location: [facility] ENDOSCOPY;  Service: Gastroenterology;  Laterality: N/A;   • ENDOSCOPY N/A 04/04/2018    Procedure: ESOPHAGOGASTRODUODENOSCOPY;  Surgeon: Austin Goins DO;  Location: [facility] ENDOSCOPY;  Service: Gastroenterology   • FINGER SURGERY Right     middle finger secondary to trauma   • PHALLOPLASTY, CIRCUMCISION N/A 1/4/2023    Procedure: CIRCUMCISION;  Surgeon: Frantz Calix MD;  Location: [facility];  Service: Urology;  Laterality: N/A;         Current Outpatient Medications:   •  aspirin 81 MG EC tablet, Take 1 tablet by mouth Every Night., Disp: , Rfl:   •  cyanocobalamin 1000 MCG/ML injection, Inject 1 mL into the appropriate muscle as directed by  prescriber Every 30 (Thirty) Days., Disp: , Rfl:   •  docusate sodium (COLACE) 100 MG capsule, Take 1 capsule by mouth 2 (Two) Times a Day As Needed for Constipation., Disp: 60 capsule, Rfl: 2  •  Ergocalciferol (VITAMIN D2 PO), Take 5,000 Units by mouth Daily., Disp: , Rfl:   •  ferrous sulfate 325 (65 FE) MG tablet, Take 1 tablet by mouth on Monday, Wednesday and Friday, Disp: 36 tablet, Rfl: 1  •  finasteride (PROSCAR) 5 MG tablet, Take 1 tablet by mouth Daily., Disp: , Rfl:   •  folic acid (FOLVITE) 1 MG tablet, Take 1 tablet by mouth once daily (Patient taking differently: 3 (Three) Times a Week.), Disp: 90 tablet, Rfl: 0  •  simvastatin (ZOCOR) 40 MG tablet, Take 1 tablet by mouth Every Evening., Disp: , Rfl:   •  tamsulosin (FLOMAX) 0.4 MG capsule 24 hr capsule, Take 1 capsule by mouth Daily., Disp: , Rfl:     No Known Allergies    Immunization History   Administered Date(s) Administered   • COVID-19 (PFIZER) BIVALENT BOOSTER 12+YRS 10/24/2022   • COVID-19 (PFIZER) PURPLE CAP 02/22/2021, 03/15/2021, 10/14/2021   • Fluzone High-Dose 65+yrs 10/14/2021   • Influenza Quad Vaccine (Inpatient) 09/06/2017   • Pneumococcal Conjugate 13-Valent (PCV13) 08/18/2016   • Pneumococcal Polysaccharide (PPSV23) 09/06/2017        Family History   Problem Relation Age of Onset   • Lung cancer Mother        Social History     Socioeconomic History   • Marital status:    Tobacco Use   • Smoking status: Former     Packs/day: 1.00     Years: 30.00     Pack years: 30.00     Types: Cigarettes   • Smokeless tobacco: Current     Types: Chew   Vaping Use   • Vaping Use: Never used   Substance and Sexual Activity   • Alcohol use: Not Currently   • Drug use: Never   • Sexual activity: Defer       Review of Systems    Vitals:    04/24/23 0940   BP: 129/85   Pulse: 80   Temp: 97.8 °F (36.6 °C)   SpO2: 96%       Physical Exam  Constitutional:       Appearance: Normal appearance.   HENT:      Head: Normocephalic and atraumatic.    Cardiovascular:      Rate and Rhythm: Normal rate and regular rhythm.   Pulmonary:      Effort: Pulmonary effort is normal. No respiratory distress.   Abdominal:      Palpations: Abdomen is soft.      Comments: Large protuberant hernia in the lower abdomen that is mildly tender to palpation without rebound or guarding   Skin:     General: Skin is warm.      Coloration: Skin is not jaundiced.   Neurological:      General: No focal deficit present.      Mental Status: He is alert and oriented to person, place, and time.   Psychiatric:         Mood and Affect: Mood normal.         Behavior: Behavior normal.         CT chest abdomen and pelvis with IV contrast 1/10/2023     INDICATION:: Cancer follow-up     TECHNIQUE: Spiral images obtained from lung apex through the  rectum following intravenous administration of 90 mL of  Isovue-300. Sagittal, axial and coronal reformatted images  generated retrospectively. Comparison with previous study dated  1/12/2022           This exam was performed according to our departmental  dose-optimization program, which includes automated exposure  control, adjustment of the mA and/or kV according to patient size  and/or use of iterative reconstruction technique.        FINDINGS:  Evidence of previous granulomatous disease in the chest  Stable 6 mm noncalcified nodule anterior right upper lobe dating  back to 2019 consistent with a benign process.  No new lung parenchymal nodules or masses.  No new areas of consolidation, acute congestive changes,  pneumothorax or pleural fluid.  Mild scattered scarring/atelectasis. Soft tissue density  dependently in the mid trachea likely represents retained mucoid  material.  Small pericardial effusion may be present.  Mild coronary artery calcification.  Stable calcified and noncalcified subcentimeter mediastinal lymph  nodes. No significant axillary or hilar adenopathy.     Small hiatal hernia.  Cholelithiasis. No definite  cholecystitis.  Splenomegaly without significant focal splenic pathology.  No focal hepatic lesions. Hepatic contour is mildly nodular.  New ascites in the abdomen and pelvis.  No definite focal pancreatic pathology.  Adrenals grossly normal.  No acute renal pathology. Scattered renal cortical cysts.  Nonobstructing 2 to 3 mm lower pole nephrolith left kidney.  Decisions regarding additional/follow-up renal imaging should be  based on clinical indications.  No bowel obstruction or free intraperitoneal air.  Diverticulosis without definite acute diverticulitis.  Status post right hemicolectomy with right paramedian  enterocolostomy anastomosis.  No significant adenopathy in the abdomen or pelvis.  Large midline ventral hernia contains multiple bowel loops. No  definite incarceration or strangulation.  Diffuse infiltration of the subcutaneous fat suggests presence of  anasarca.  Partida catheter in the bladder. Bladder not completely empty and  catheter flushed recommended for improved function.  No definite focal bony lesions.     IMPRESSION:  Stable probably benign noncalcified nodule right upper lobe.  Small pericardial effusion may be present.  No evidence of metastatic disease in the chest.  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 Suspect  anasarca.  Large midline ventral hernia containing multiple bowel loops  without incarceration or strangulation.  No evidence of metastatic disease or definite recurrent  intra-abdominal or pelvic malignancy.  Other findings as above. See body of report for full details.     Electronically signed by:  Angel Hunter MD  1/12/2023 8:33 AM  CST Workstation: [id number]    ASSESSMENT    Diagnoses and all orders for this visit:    1. Incisional hernia, without obstruction or gangrene (Primary)    2. Cirrhosis of liver with ascites, unspecified hepatic cirrhosis type        PLAN    1.  I counseled the patient that he is at high risk for wound complications and poor outcome from  surgical repair of his hernia at this time.  I will have him see gastroenterology to get treatment for his cirrhosis and then see him back.  I will see him back in 3 months to discuss surgical repair at that time.              This document has been electronically signed by Phil Florence MD on April 24, 2023 10:10 CDT

## 2023-05-03 ENCOUNTER — HOSPITAL ENCOUNTER (OUTPATIENT)
Dept: ULTRASOUND IMAGING | Facility: HOSPITAL | Age: 83
Discharge: HOME | End: 2023-05-03
Payer: MEDICARE

## 2023-05-03 VITALS
DIASTOLIC BLOOD PRESSURE: 67 MMHG | HEART RATE: 67 BPM | RESPIRATION RATE: 18 BRPM | SYSTOLIC BLOOD PRESSURE: 111 MMHG | TEMPERATURE: 98.5 F

## 2023-05-03 DIAGNOSIS — K74.69 FLORID CIRRHOSIS: ICD-10-CM

## 2023-05-03 PROCEDURE — 76942 ECHO GUIDE FOR BIOPSY: CPT

## 2023-05-03 NOTE — PRE-PROCEDURE NOTE
Patient's history and physical exam were reviewed, and no changes were noted.  The risks and benefits of paracentesis were discussed with the patient, and the patient had ample opportunity to ask questions.  Informed consent was obtained.

## 2023-05-19 ENCOUNTER — HOSPITAL ENCOUNTER (OUTPATIENT)
Dept: ULTRASOUND IMAGING | Facility: HOSPITAL | Age: 83
Discharge: HOME | End: 2023-05-19
Payer: MEDICARE

## 2023-05-19 VITALS
RESPIRATION RATE: 18 BRPM | SYSTOLIC BLOOD PRESSURE: 108 MMHG | DIASTOLIC BLOOD PRESSURE: 75 MMHG | OXYGEN SATURATION: 96 % | TEMPERATURE: 97.5 F | HEART RATE: 83 BPM

## 2023-05-19 DIAGNOSIS — K74.69 FLORID CIRRHOSIS: ICD-10-CM

## 2023-05-19 PROCEDURE — 76942 ECHO GUIDE FOR BIOPSY: CPT

## 2023-05-19 NOTE — POST-PROCEDURE NOTE
Post Op Procedure Note          Pre-Op Diagnosis: ascites    Post-Op Diagnosis: same    Procedure: paracentesis    Surgeon: thea    Assistant: none    Estimated Blood Loss: none    Complications: none    Specimens: none    Findings: see report    Drains: noen    Anesthesia: local      Denys Ashley MD,

## 2023-06-02 ENCOUNTER — HOSPITAL ENCOUNTER (OUTPATIENT)
Dept: ULTRASOUND IMAGING | Facility: HOSPITAL | Age: 83
Discharge: HOME | End: 2023-06-02

## 2023-06-02 VITALS
HEART RATE: 75 BPM | TEMPERATURE: 98.2 F | OXYGEN SATURATION: 94 % | DIASTOLIC BLOOD PRESSURE: 73 MMHG | SYSTOLIC BLOOD PRESSURE: 107 MMHG

## 2023-06-02 DIAGNOSIS — K74.69 FLORID CIRRHOSIS: ICD-10-CM

## 2023-06-02 LAB
APPEARANCE FLD: CLEAR
COLOR FLD: YELLOW
MONOS+MACROS NFR FLD: 84 %
NEUTROPHILS NFR FLD MANUAL: 16 %
RBC # FLD AUTO: 1000 /MM3 (ref 0–0)
SPECIMEN VOL FLD: 4500 ML
WBC # FLD: 185.5 /MM3 (ref 0–5)

## 2023-06-02 PROCEDURE — 87070 CULTURE OTHR SPECIMN AEROBIC: CPT | Performed by: INTERNAL MEDICINE

## 2023-06-02 PROCEDURE — 89051 BODY FLUID CELL COUNT: CPT | Performed by: INTERNAL MEDICINE

## 2023-06-02 PROCEDURE — 76942 ECHO GUIDE FOR BIOPSY: CPT

## 2023-06-02 PROCEDURE — 87205 SMEAR GRAM STAIN: CPT | Performed by: INTERNAL MEDICINE

## 2023-06-06 LAB — REF LAB TEST METHOD: NORMAL

## 2023-06-07 LAB
BACTERIA FLD CULT: NORMAL
GRAM STN SPEC: NORMAL
GRAM STN SPEC: NORMAL

## 2023-08-09 RX ORDER — FOLIC ACID 1 MG/1
1 TABLET ORAL 3 TIMES WEEKLY
Qty: 36 TABLET | Refills: 1 | Status: SHIPPED | OUTPATIENT
Start: 2023-08-09

## 2023-08-09 NOTE — TELEPHONE ENCOUNTER
Rx Refill Note  Requested Prescriptions     Pending Prescriptions Disp Refills    folic acid (FOLVITE) 1 MG tablet [Pharmacy Med Name: Folic Acid 1 MG Oral Tablet] 90 tablet 0     Sig: Take 1 tablet by mouth once daily      Last office visit with prescribing clinician: 4/18/2023   Last telemedicine visit with prescribing clinician: Visit date not found   Next office visit with prescribing clinician: 8/15/2023                         Would you like a call back once the refill request has been completed: [] Yes [] No    If the office needs to give you a call back, can they leave a voicemail: [] Yes [] No    Stevenson Farnsworth Rep  08/09/23, 07:49 CDT

## 2023-08-12 NOTE — PROGRESS NOTES
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  Denies any bleeding.  Complains of fatigue.  Complains of chronic neuropathy.  Complains of arthralgia.  Complains of abdominal distention for which she is scheduled to undergo paracentesis tomorrow.  Denies any bleeding.  Denies any change in bowel habits.  Denies any new lymph node enlargement.                Past Medical History, Past Surgical History, Social History, Family History have been reviewed and are without significant changes except as mentioned.    Review of Systems   A comprehensive 14 point review of systems was performed and was negative except as mentioned in HPI.    Medications:  The current medication list was reviewed in the EMR    ALLERGIES:  No Known Allergies    Objective      Vitals:    08/15/23 1038   Weight: 90.4 kg (199 lb 3.2 oz)   PainSc: 0-No pain         7/9/2021     9:59 AM   Current Status   ECOG score 1       Physical Exam  Pulmonary:      Breath sounds: Normal breath sounds.   Abdominal:      General: There is distension.   Neurological:      Mental Status: He is alert and oriented to person, place, and time.         RECENT LABS:  Glucose   Date Value Ref Range Status   08/15/2023 101 (H) 65 - 99 mg/dL Final   04/04/2023 94.0 70.0 - 105.0 mg/dL Final     Sodium   Date Value Ref Range Status   08/15/2023 137 136 - 145 mmol/L Final   08/11/2023 137 136 - 145 mmol/L Final     Potassium   Date Value Ref Range Status   08/15/2023 4.3 3.5 - 5.2 mmol/L Final    08/11/2023 4.8 3.5 - 5.1 mmol/L Final     CO2   Date Value Ref Range Status   08/15/2023 24.0 22.0 - 29.0 mmol/L Final     Total CO2   Date Value Ref Range Status   08/11/2023 24 21 - 31 mmol/L Final     Chloride   Date Value Ref Range Status   08/15/2023 104 98 - 107 mmol/L Final   08/11/2023 103 98 - 107 mmol/L Final     Anion Gap   Date Value Ref Range Status   08/15/2023 9.0 5.0 - 15.0 mmol/L Final   08/11/2023 10 4 - 12 mmol/L Final     Creatinine   Date Value Ref Range Status   08/15/2023 1.48 (H) 0.76 - 1.27 mg/dL Final   08/11/2023 1.4 (H) 0.7 - 1.3 mg/dL Final     BUN   Date Value Ref Range Status   08/15/2023 25 (H) 8 - 23 mg/dL Final   08/11/2023 20 7 - 25 mg/dL Final     BUN/Creatinine Ratio   Date Value Ref Range Status   08/15/2023 16.9 7.0 - 25.0 Final     Calcium   Date Value Ref Range Status   08/15/2023 8.4 (L) 8.6 - 10.5 mg/dL Final   08/11/2023 8.7 8.6 - 10.3 mg/dL Final     eGFR Non  Amer   Date Value Ref Range Status   01/12/2022 78 >60 mL/min/1.73 Final     Alkaline Phosphatase   Date Value Ref Range Status   08/15/2023 56 39 - 117 U/L Final   08/11/2023 49 34 - 104 U/L Final     Total Protein   Date Value Ref Range Status   08/15/2023 7.1 6.0 - 8.5 g/dL Final   04/04/2023 6.3 6.0 - 8.3 g/dL Final     ALT (SGPT)   Date Value Ref Range Status   08/15/2023 8 1 - 41 U/L Final   08/11/2023 9 7 - 52 U/L Final     AST (SGOT)   Date Value Ref Range Status   08/15/2023 18 1 - 40 U/L Final   08/11/2023 19 13 - 39 U/L Final     Total Bilirubin   Date Value Ref Range Status   08/15/2023 0.9 0.0 - 1.2 mg/dL Final   08/11/2023 1.14 (H) 0.3 - 1.0 mg/dL Final     Albumin   Date Value Ref Range Status   08/15/2023 3.2 (L) 3.5 - 5.2 g/dL Final   08/11/2023 3.5 3.5 - 5.7 g/dL Final     Globulin   Date Value Ref Range Status   08/15/2023 3.9 gm/dL Final     Lab Results   Component Value Date    WBC 3.97 08/15/2023    HGB 10.7 (L) 08/15/2023    HCT 31.4 (L) 08/15/2023    MCV 86.3 08/15/2023      (L) 08/15/2023     Lab Results   Component Value Date    NEUTROABS 2.64 08/15/2023    IRON 56 (L) 08/15/2023    IRON 31 (L) 2023    IRON 35 (L) 2023    TIBC 249 (L) 08/15/2023    TIBC 199 (L) 2023    TIBC 195 (L) 2023    LABIRON 23 08/15/2023    LABIRON 18 (L) 2023    LABIRON 17 (L) 01/10/2023    FERRITIN 298.10 08/15/2023    FERRITIN 199 2023    FERRITIN 248.90 2023    IREQQQKP60 >2,000 (H) 2023    WMTZMFHS90 836.6 2023    CWQESWFY95 999 (H) 01/10/2023    FOLATE 18.90 2023    FOLATE 19.00 01/10/2023    FOLATE >20.00 2022     Lab Results   Component Value Date    CEA 1.51 01/10/2023    REFLABREPO  2023     [facility]  [address] [place] [address]  Phone: [phone] or [phone]  Fax: [phone]  Aubrey Michaud M.D., Medical Director    PATIENT NAME                                 LABORATORY NO.  [id number]   OSEAS MEIER.                   [id number]  [id number]                                     AGE                SEX     N            CLIENT REF #  [facility]                       83       1940          [id number]    [id number]  [place]                                   REQUESTING M.D.       [place] [address]                         DATE COLLECTED        DATE RECEIVED          DATE REPORTED  2023    DIAGNOSIS:  ABDOMINAL FLUID:  Negative for malignant cells.    MICROSCOPIC DESCRIPTION:  Scattered benign mesothelial cells are present. Chronic inflammation is present.    Professional interpretation rendered by Yasmani Yepez M.D., F.C.A.P. at [facility], [facility], [address] [place] [address].    CLINICAL HISTORY:  Florid cirrhosis    SPECIMENS SUBMITTED:  ABDOMINAL FLUID    GROSS SPECIMEN  DESCRIPTION:  50cc of yellow fluid with visible sediment in fixative  Cell block has been examined.    CYTOTECHNOLOGIST:         ENEDINA DOTY (ASCP)                       REVIEWED, DIAGNOSED AND ELECTRONICALLY  SIGNED BY:    Yasmani Yepez M.D., F.C.A.P.  CPT CODES:  09737, [id number]           PATHOLOGY:  * Cannot find OR log *         RADIOLOGY DATA :  No radiology results for the last 7 days        Assessment & Plan     1.  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  - Colonoscopy on August 30, 2022 was negative for malignancy.  - CEA level today is 1.87  - We will have patient return to clinic in 3 months with repeat CBC, CMP, CEA, iron studies, ferritin, B12 and folate to be done on that day  - Next surveillance CT of chest abdomen and pelvis with contrast will be due in January 2024.    2.  Adenocarcinoma of transverse colon stage III, T3 N1 M0 diagnosed in July 2020.  - Had a surgery followed by [facility] that completed in March 2013    3.  Anemia:  - Hemoglobin is decreased to 10.7  - Currently on ferrous sulfate and folic acid 3 times a week with monthly B12 injection.    4.  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  Vitamin B12 deficiency:  - Remains on monthly B12 injection    6.  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  Health maintenance: Patient does not smoke.  Had a colonoscopy in 2022 by Dr. Goins    8.  Advance care planning:  - Patient remains full code.    9.  Acute kidney injury:  - Creatinine is elevated at 1.48.  Patient was notified about elevated creatinine and need to increase hydration.                         PHQ-9 Total Score: 1   -Patient is not homicidal or suicidal.  No acute intervention required.     Sunny Ernandez reports a pain score of 0.  Given his pain  assessment as noted, treatment options were discussed and the following options were decided upon as a follow-up plan to address the patient's pain: continuation of current treatment plan for pain.         Dany Baig MD  8/15/2023  10:43 CDT        Part of this note may be an electronic transcription/translation of spoken language to printed text using the Dragon Dictation System.          CC:

## 2023-08-15 ENCOUNTER — LAB (OUTPATIENT)
Dept: ONCOLOGY | Facility: HOSPITAL | Age: 83
End: 2023-08-15
Payer: MEDICARE

## 2023-08-15 ENCOUNTER — OFFICE VISIT (OUTPATIENT)
Dept: ONCOLOGY | Facility: CLINIC | Age: 83
End: 2023-08-15
Payer: MEDICARE

## 2023-08-15 VITALS
SYSTOLIC BLOOD PRESSURE: 96 MMHG | HEART RATE: 60 BPM | BODY MASS INDEX: 27.78 KG/M2 | DIASTOLIC BLOOD PRESSURE: 64 MMHG | WEIGHT: 199.2 LBS | OXYGEN SATURATION: 97 %

## 2023-08-15 DIAGNOSIS — Z85.038 PERSONAL HISTORY OF COLON CANCER: ICD-10-CM

## 2023-08-15 DIAGNOSIS — D64.9 ANEMIA, UNSPECIFIED TYPE: Chronic | ICD-10-CM

## 2023-08-15 DIAGNOSIS — G62.0 NEUROPATHY DUE TO CHEMOTHERAPEUTIC DRUG: Chronic | ICD-10-CM

## 2023-08-15 DIAGNOSIS — D64.9 ANEMIA, UNSPECIFIED TYPE: ICD-10-CM

## 2023-08-15 DIAGNOSIS — E53.8 B12 DEFICIENCY: ICD-10-CM

## 2023-08-15 DIAGNOSIS — T45.1X5A NEUROPATHY DUE TO CHEMOTHERAPEUTIC DRUG: Chronic | ICD-10-CM

## 2023-08-15 DIAGNOSIS — T45.1X5A NEUROPATHY DUE TO CHEMOTHERAPEUTIC DRUG: ICD-10-CM

## 2023-08-15 DIAGNOSIS — C18.2 MALIGNANT NEOPLASM OF ASCENDING COLON: ICD-10-CM

## 2023-08-15 DIAGNOSIS — G62.0 NEUROPATHY DUE TO CHEMOTHERAPEUTIC DRUG: ICD-10-CM

## 2023-08-15 DIAGNOSIS — E53.8 B12 DEFICIENCY: Chronic | ICD-10-CM

## 2023-08-15 DIAGNOSIS — C18.2 MALIGNANT NEOPLASM OF ASCENDING COLON: Primary | Chronic | ICD-10-CM

## 2023-08-15 DIAGNOSIS — D69.6 THROMBOCYTOPENIA: ICD-10-CM

## 2023-08-15 DIAGNOSIS — D69.6 THROMBOCYTOPENIA: Chronic | ICD-10-CM

## 2023-08-15 LAB
ALBUMIN SERPL-MCNC: 3.2 G/DL (ref 3.5–5.2)
ALBUMIN/GLOB SERPL: 0.8 G/DL
ALP SERPL-CCNC: 56 U/L (ref 39–117)
ALT SERPL W P-5'-P-CCNC: 8 U/L (ref 1–41)
ANION GAP SERPL CALCULATED.3IONS-SCNC: 9 MMOL/L (ref 5–15)
AST SERPL-CCNC: 18 U/L (ref 1–40)
BASOPHILS # BLD AUTO: 0.04 10*3/MM3 (ref 0–0.2)
BASOPHILS NFR BLD AUTO: 1 % (ref 0–1.5)
BILIRUB SERPL-MCNC: 0.9 MG/DL (ref 0–1.2)
BUN SERPL-MCNC: 25 MG/DL (ref 8–23)
BUN/CREAT SERPL: 16.9 (ref 7–25)
CALCIUM SPEC-SCNC: 8.4 MG/DL (ref 8.6–10.5)
CEA SERPL-MCNC: 1.87 NG/ML
CHLORIDE SERPL-SCNC: 104 MMOL/L (ref 98–107)
CO2 SERPL-SCNC: 24 MMOL/L (ref 22–29)
CREAT SERPL-MCNC: 1.48 MG/DL (ref 0.76–1.27)
DEPRECATED RDW RBC AUTO: 49.4 FL (ref 37–54)
EGFRCR SERPLBLD CKD-EPI 2021: 46.7 ML/MIN/1.73
EOSINOPHIL # BLD AUTO: 0.2 10*3/MM3 (ref 0–0.4)
EOSINOPHIL NFR BLD AUTO: 5 % (ref 0.3–6.2)
ERYTHROCYTE [DISTWIDTH] IN BLOOD BY AUTOMATED COUNT: 15.5 % (ref 12.3–15.4)
FERRITIN SERPL-MCNC: 298.1 NG/ML (ref 30–400)
FOLATE SERPL-MCNC: 16.1 NG/ML (ref 4.78–24.2)
GLOBULIN UR ELPH-MCNC: 3.9 GM/DL
GLUCOSE SERPL-MCNC: 101 MG/DL (ref 65–99)
HCT VFR BLD AUTO: 31.4 % (ref 37.5–51)
HGB BLD-MCNC: 10.7 G/DL (ref 13–17.7)
IMM GRANULOCYTES # BLD AUTO: 0.02 10*3/MM3 (ref 0–0.05)
IMM GRANULOCYTES NFR BLD AUTO: 0.5 % (ref 0–0.5)
IRON 24H UR-MRATE: 56 MCG/DL (ref 59–158)
IRON SATN MFR SERPL: 23 % (ref 20–50)
LYMPHOCYTES # BLD AUTO: 0.65 10*3/MM3 (ref 0.7–3.1)
LYMPHOCYTES NFR BLD AUTO: 16.4 % (ref 19.6–45.3)
MCH RBC QN AUTO: 29.4 PG (ref 26.6–33)
MCHC RBC AUTO-ENTMCNC: 34.1 G/DL (ref 31.5–35.7)
MCV RBC AUTO: 86.3 FL (ref 79–97)
MONOCYTES # BLD AUTO: 0.42 10*3/MM3 (ref 0.1–0.9)
MONOCYTES NFR BLD AUTO: 10.6 % (ref 5–12)
NEUTROPHILS NFR BLD AUTO: 2.64 10*3/MM3 (ref 1.7–7)
NEUTROPHILS NFR BLD AUTO: 66.5 % (ref 42.7–76)
NRBC BLD AUTO-RTO: 0 /100 WBC (ref 0–0.2)
PLATELET # BLD AUTO: 109 10*3/MM3 (ref 140–450)
PMV BLD AUTO: 11 FL (ref 6–12)
POTASSIUM SERPL-SCNC: 4.3 MMOL/L (ref 3.5–5.2)
PROT SERPL-MCNC: 7.1 G/DL (ref 6–8.5)
RBC # BLD AUTO: 3.64 10*6/MM3 (ref 4.14–5.8)
SODIUM SERPL-SCNC: 137 MMOL/L (ref 136–145)
TIBC SERPL-MCNC: 249 MCG/DL (ref 298–536)
TRANSFERRIN SERPL-MCNC: 167 MG/DL (ref 200–360)
VIT B12 BLD-MCNC: 695 PG/ML (ref 211–946)
WBC NRBC COR # BLD: 3.97 10*3/MM3 (ref 3.4–10.8)

## 2023-08-15 RX ORDER — SPIRONOLACTONE 25 MG/1
25 TABLET ORAL 2 TIMES DAILY
COMMUNITY

## 2023-08-15 RX ORDER — FUROSEMIDE 40 MG/1
40 TABLET ORAL DAILY
COMMUNITY

## 2023-08-16 ENCOUNTER — TELEPHONE (OUTPATIENT)
Dept: ONCOLOGY | Facility: CLINIC | Age: 83
End: 2023-08-16
Payer: MEDICARE

## 2023-08-16 NOTE — TELEPHONE ENCOUNTER
----- Message from Dany Baig MD sent at 8/16/2023  6:55 AM CDT -----  Please let patient know, he is colon cancer blood test CEA was normal at 1.87.  He needs to continue taking iron tablet and folic acid 3 times a week.  He needs to continue with monthly B12 injection.  Thank you

## 2023-09-08 ENCOUNTER — HOSPITAL ENCOUNTER (OUTPATIENT)
Dept: ULTRASOUND IMAGING | Facility: HOSPITAL | Age: 83
Discharge: HOME | End: 2023-09-08
Payer: MEDICARE

## 2023-09-08 VITALS
RESPIRATION RATE: 18 BRPM | OXYGEN SATURATION: 96 % | SYSTOLIC BLOOD PRESSURE: 100 MMHG | DIASTOLIC BLOOD PRESSURE: 60 MMHG | HEART RATE: 63 BPM

## 2023-09-08 DIAGNOSIS — K74.69 FLORID CIRRHOSIS: ICD-10-CM

## 2023-09-08 PROCEDURE — 76942 ECHO GUIDE FOR BIOPSY: CPT

## 2023-09-08 NOTE — POST-PROCEDURE NOTE
Post Op Procedure Note          Pre-Op Diagnosis: ascites    Post-Op Diagnosis: ascites    Procedure: US guided paracentesis    Surgeon: gaurav    Assistant: none    Estimated Blood Loss: none    Complications: none    Specimens: 1000 mL fluid removed    Findings: ascites    Drains: none    Anesthesia: local only, 8 mL lidocaine    Oscar Negron MD,

## 2023-09-08 NOTE — PRE-PROCEDURE NOTE
Patient's history and physical exam were reviewed, and no changes were noted.  The risks and benefits of paracentesis were discussed with the patient, and the patient had ample opportunity to ask questions.  Informed consent was obtained.

## (undated) DEVICE — SUT VIC 2/0 SH 27IN

## (undated) DEVICE — BANDAGE,GAUZE,CONFORMING,1"X75",STRL,LF: Brand: MEDLINE

## (undated) DEVICE — PENCL E/S HNDSWTCH SMOKEEVAC HOLSTR 10FT

## (undated) DEVICE — SPNG GZ WOVN 4X4IN 12PLY 10/BX STRL

## (undated) DEVICE — GLV SURG SENSICARE GREEN W/ALOE PF LF 7 STRL

## (undated) DEVICE — SUT VIC 4/0 RB1 27IN J214H

## (undated) DEVICE — SPNG LAP 18X18IN LF STRL PK/5

## (undated) DEVICE — SUT CARD 2/0 30IN ETX833H

## (undated) DEVICE — APPL CHLORAPREP W/TINT 26ML ORNG

## (undated) DEVICE — SINGLE-USE BIOPSY FORCEPS: Brand: RADIAL JAW 4

## (undated) DEVICE — PATIENT RETURN ELECTRODE, SINGLE-USE, CONTACT QUALITY MONITORING, ADULT, WITH 9FT CORD, FOR PATIENTS WEIGING OVER 33LBS. (15KG): Brand: MEGADYNE

## (undated) DEVICE — SUT SILK 0 TIES 30IN A306H

## (undated) DEVICE — SOL IRR NACL 0.9PCT BT 1000ML

## (undated) DEVICE — SUT PROLN 1 CTX 30IN 8455H

## (undated) DEVICE — BNDG ELAS CO-FLEX SLF ADHR 3IN5YD LF2 STRL

## (undated) DEVICE — SUT VIC 2/0 TIES 18IN J111T

## (undated) DEVICE — SUT PDS CLOSURE CT1 1/0 27IN Z341H

## (undated) DEVICE — DRN WND JP RND W TROC SIL 19F 1/4IN

## (undated) DEVICE — GLV SURG SIGNATURE ESSENTIAL PF LTX SZ6.5

## (undated) DEVICE — STERILE POLYISOPRENE POWDER-FREE SURGICAL GLOVES WITH EMOLLIENT COATING: Brand: PROTEXIS

## (undated) DEVICE — GLV SURG SENSICARE PI LF PF 7.5 GRN STRL

## (undated) DEVICE — ENSEAL 20 CM SHAFT, LARGE JAW: Brand: ENSEAL X1

## (undated) DEVICE — SUT SILK 2/0 TIES 30IN SA85H

## (undated) DEVICE — SUT PERMAHAND SILK 3/0 SH1 18IN

## (undated) DEVICE — PATIENT RETURN ELECTRODE, SINGLE-USE, CONTACT QUALITY MONITORING, ADULT, WITH 15 FT (4.5 M) CORD. FOR PATIENTS WEIGHING OVER 33LBS. (15KG): Brand: MEGADYNE

## (undated) DEVICE — CATHETER,FOLEY,100%SILICONE,16FR,10ML,LF: Brand: MEDLINE

## (undated) DEVICE — GOWN ,SIRUS ,NONREINFORCED 4XL: Brand: MEDLINE

## (undated) DEVICE — ELECTRD NDL MEGADYNE 2.75IN SS

## (undated) DEVICE — GLV SURG TRIUMPH LT PF LTX 6.5 STRL

## (undated) DEVICE — ELECTRD BLD STD SS 3/32X6.5IN

## (undated) DEVICE — PK MAJ PROC LF 60

## (undated) DEVICE — TBG PENCL TELESCP MEGADYNE SMOKE EVAC 10FT

## (undated) DEVICE — DRAINBAG,ANTI-REFLUX TOWER,L/F,2000ML,LL: Brand: MEDLINE

## (undated) DEVICE — GOWN,AURORA,NOREINF,RAGLAN,XL,STERILE: Brand: MEDLINE

## (undated) DEVICE — RESERVOIR,SUCTION,100CC,SILICONE: Brand: MEDLINE

## (undated) DEVICE — TRAP SXN POLYP QUICKCATCH LF

## (undated) DEVICE — SUT VICRYL 0 TIES J112T

## (undated) DEVICE — SUT VICRYL 3-0 SH-1 PO 18IN J772D

## (undated) DEVICE — SPNG DRN AMD EXCILON 6PLY 4X4IN PK/2

## (undated) DEVICE — DRSNG GZ PETROLTM CURAD NONADHR 1/2X72IN LF STRL

## (undated) DEVICE — SOL NACL 0.9PCT 1000ML

## (undated) DEVICE — STERILE POLYISOPRENE POWDER-FREE SURGICAL GLOVES: Brand: PROTEXIS

## (undated) DEVICE — Device: Brand: DISPOSABLE ELECTROSURGICAL SNARE

## (undated) DEVICE — CANN SMPL SOFTECH BIFLO ETCO2 A/M 7FT

## (undated) DEVICE — SUT VICRYL 2-0  X-1 27IN ETVCP459H PLS

## (undated) DEVICE — TOTAL TRAY, 16FR 10ML SIL FOLEY, URN: Brand: MEDLINE

## (undated) DEVICE — GLV SURG SENSICARE GREEN W/ALOE PF LF 8 STRL

## (undated) DEVICE — STPLR SKIN VISISTAT WD 35CT

## (undated) DEVICE — SUT NLY 2/0 664G

## (undated) DEVICE — GLV SURG TRIUMPH LT PF LTX 7.5 STRL

## (undated) DEVICE — SUT GUT CHRM 2/0 SH 27IN G123H

## (undated) DEVICE — GAUZE,SPONGE,4"X4",16PLY,XRAY,STRL,LF: Brand: MEDLINE